# Patient Record
Sex: FEMALE | Race: WHITE | NOT HISPANIC OR LATINO | Employment: FULL TIME | URBAN - METROPOLITAN AREA
[De-identification: names, ages, dates, MRNs, and addresses within clinical notes are randomized per-mention and may not be internally consistent; named-entity substitution may affect disease eponyms.]

---

## 2019-02-15 ENCOUNTER — TRANSFERRED RECORDS (OUTPATIENT)
Dept: HEALTH INFORMATION MANAGEMENT | Facility: CLINIC | Age: 39
End: 2019-02-15

## 2019-12-19 DIAGNOSIS — O36.80X0 PREGNANCY WITH INCONCLUSIVE FETAL VIABILITY: Primary | ICD-10-CM

## 2019-12-19 NOTE — PROGRESS NOTES
Pt has NOB scheduled with ultrasound. Needed order to be placed. Future ultrasound order placed and linked with appt. Closing encounter.   Marilyn Cobb RN on 12/19/2019 at 1:00 PM

## 2019-12-20 ENCOUNTER — ANCILLARY PROCEDURE (OUTPATIENT)
Dept: ULTRASOUND IMAGING | Facility: CLINIC | Age: 39
End: 2019-12-20
Payer: COMMERCIAL

## 2019-12-20 ENCOUNTER — PRENATAL OFFICE VISIT (OUTPATIENT)
Dept: OBGYN | Facility: CLINIC | Age: 39
End: 2019-12-20
Payer: COMMERCIAL

## 2019-12-20 VITALS
BODY MASS INDEX: 26.34 KG/M2 | SYSTOLIC BLOOD PRESSURE: 137 MMHG | HEIGHT: 68 IN | HEART RATE: 74 BPM | DIASTOLIC BLOOD PRESSURE: 90 MMHG | WEIGHT: 173.8 LBS

## 2019-12-20 DIAGNOSIS — O36.80X0 PREGNANCY WITH INCONCLUSIVE FETAL VIABILITY: ICD-10-CM

## 2019-12-20 DIAGNOSIS — O09.511 PRIMIGRAVIDA OF ADVANCED MATERNAL AGE IN FIRST TRIMESTER: ICD-10-CM

## 2019-12-20 DIAGNOSIS — Z13.79 GENETIC SCREENING: Primary | ICD-10-CM

## 2019-12-20 DIAGNOSIS — O30.041 DICHORIONIC DIAMNIOTIC TWIN PREGNANCY IN FIRST TRIMESTER: ICD-10-CM

## 2019-12-20 DIAGNOSIS — O09.91 SUPERVISION OF HIGH RISK PREGNANCY IN FIRST TRIMESTER: ICD-10-CM

## 2019-12-20 LAB
ABO + RH BLD: NORMAL
ABO + RH BLD: NORMAL
ALBUMIN UR-MCNC: NEGATIVE MG/DL
APPEARANCE UR: CLEAR
BILIRUB UR QL STRIP: NEGATIVE
BLD GP AB SCN SERPL QL: NORMAL
BLOOD BANK CMNT PATIENT-IMP: NORMAL
COLOR UR AUTO: YELLOW
ERYTHROCYTE [DISTWIDTH] IN BLOOD BY AUTOMATED COUNT: 11.9 % (ref 10–15)
GLUCOSE UR STRIP-MCNC: NEGATIVE MG/DL
HCT VFR BLD AUTO: 35.7 % (ref 35–47)
HGB BLD-MCNC: 12 G/DL (ref 11.7–15.7)
HGB UR QL STRIP: NEGATIVE
KETONES UR STRIP-MCNC: NEGATIVE MG/DL
LEUKOCYTE ESTERASE UR QL STRIP: NEGATIVE
MCH RBC QN AUTO: 30 PG (ref 26.5–33)
MCHC RBC AUTO-ENTMCNC: 33.6 G/DL (ref 31.5–36.5)
MCV RBC AUTO: 89 FL (ref 78–100)
NITRATE UR QL: NEGATIVE
PH UR STRIP: 7.5 PH (ref 5–7)
PLATELET # BLD AUTO: 268 10E9/L (ref 150–450)
RBC # BLD AUTO: 4 10E12/L (ref 3.8–5.2)
SOURCE: ABNORMAL
SP GR UR STRIP: 1.02 (ref 1–1.03)
SPECIMEN EXP DATE BLD: NORMAL
UROBILINOGEN UR STRIP-ACNC: 0.2 EU/DL (ref 0.2–1)
WBC # BLD AUTO: 9.5 10E9/L (ref 4–11)

## 2019-12-20 PROCEDURE — 86762 RUBELLA ANTIBODY: CPT | Performed by: OBSTETRICS & GYNECOLOGY

## 2019-12-20 PROCEDURE — 76817 TRANSVAGINAL US OBSTETRIC: CPT | Performed by: OBSTETRICS & GYNECOLOGY

## 2019-12-20 PROCEDURE — 86900 BLOOD TYPING SEROLOGIC ABO: CPT | Performed by: OBSTETRICS & GYNECOLOGY

## 2019-12-20 PROCEDURE — 36415 COLL VENOUS BLD VENIPUNCTURE: CPT | Performed by: OBSTETRICS & GYNECOLOGY

## 2019-12-20 PROCEDURE — 85027 COMPLETE CBC AUTOMATED: CPT | Performed by: OBSTETRICS & GYNECOLOGY

## 2019-12-20 PROCEDURE — 99207 ZZC FIRST OB VISIT: CPT | Performed by: OBSTETRICS & GYNECOLOGY

## 2019-12-20 PROCEDURE — 81003 URINALYSIS AUTO W/O SCOPE: CPT | Performed by: OBSTETRICS & GYNECOLOGY

## 2019-12-20 PROCEDURE — 86901 BLOOD TYPING SEROLOGIC RH(D): CPT | Performed by: OBSTETRICS & GYNECOLOGY

## 2019-12-20 PROCEDURE — 87389 HIV-1 AG W/HIV-1&-2 AB AG IA: CPT | Performed by: OBSTETRICS & GYNECOLOGY

## 2019-12-20 PROCEDURE — 87086 URINE CULTURE/COLONY COUNT: CPT | Performed by: OBSTETRICS & GYNECOLOGY

## 2019-12-20 PROCEDURE — 87340 HEPATITIS B SURFACE AG IA: CPT | Performed by: OBSTETRICS & GYNECOLOGY

## 2019-12-20 PROCEDURE — 87491 CHLMYD TRACH DNA AMP PROBE: CPT | Performed by: OBSTETRICS & GYNECOLOGY

## 2019-12-20 PROCEDURE — 86780 TREPONEMA PALLIDUM: CPT | Performed by: OBSTETRICS & GYNECOLOGY

## 2019-12-20 PROCEDURE — 87591 N.GONORRHOEAE DNA AMP PROB: CPT | Performed by: OBSTETRICS & GYNECOLOGY

## 2019-12-20 PROCEDURE — 86850 RBC ANTIBODY SCREEN: CPT | Performed by: OBSTETRICS & GYNECOLOGY

## 2019-12-20 RX ORDER — ASPIRIN 81 MG/1
81 TABLET, CHEWABLE ORAL DAILY
COMMUNITY
End: 2020-07-14

## 2019-12-20 ASSESSMENT — ANXIETY QUESTIONNAIRES
5. BEING SO RESTLESS THAT IT IS HARD TO SIT STILL: NOT AT ALL
6. BECOMING EASILY ANNOYED OR IRRITABLE: NOT AT ALL
2. NOT BEING ABLE TO STOP OR CONTROL WORRYING: NOT AT ALL
7. FEELING AFRAID AS IF SOMETHING AWFUL MIGHT HAPPEN: NOT AT ALL
1. FEELING NERVOUS, ANXIOUS, OR ON EDGE: NOT AT ALL
GAD7 TOTAL SCORE: 0
IF YOU CHECKED OFF ANY PROBLEMS ON THIS QUESTIONNAIRE, HOW DIFFICULT HAVE THESE PROBLEMS MADE IT FOR YOU TO DO YOUR WORK, TAKE CARE OF THINGS AT HOME, OR GET ALONG WITH OTHER PEOPLE: NOT DIFFICULT AT ALL
3. WORRYING TOO MUCH ABOUT DIFFERENT THINGS: NOT AT ALL

## 2019-12-20 ASSESSMENT — MIFFLIN-ST. JEOR: SCORE: 1511.85

## 2019-12-20 ASSESSMENT — PATIENT HEALTH QUESTIONNAIRE - PHQ9
SUM OF ALL RESPONSES TO PHQ QUESTIONS 1-9: 2
5. POOR APPETITE OR OVEREATING: NOT AT ALL

## 2019-12-20 NOTE — PROGRESS NOTES
"    SUBJECTIVE:     HPI:    This is a 39 year old female patient,  who presents for her first obstetrical visit.    EVA: 2020, by Embryo Transfer.  She is 9w2d weeks.  Her cycles are irregular.  Twins IVF.   Since her LMP, she has experienced  nausea, fatigue and siatic pain).       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      She has a history of  first pregnancy twins    Since her last LMP she denies use of alcohol, tobacco and street drugs.    Past medical, surgical, social and family history were reviewed and updated in [facility].        Current Outpatient Medications   Medication     aspirin (ASA) 81 MG chewable tablet     Prenat w/o T-DQ-Ssmvifj-FA-DHA (PNV-DHA PO)     No current facility-administered medications for this visit.        ROS:   12 point review of systems negative other than symptoms noted below or in the HPI.  Constitutional: Fatigue  Gastrointestinal: Nausea  Musculoskeletal: siatic pain      OBJECTIVE:     EXAM:  BP (!) 137/90   Pulse 74   Ht 1.727 m (5' 8\")   Wt 78.8 kg (173 lb 12.8 oz)   BMI 26.43 kg/m   Body mass index is 26.43 kg/m .    GENERAL: healthy, alert and no distress  EYES: Eyes grossly normal to inspection, PERRL and conjunctivae and sclerae normal  HENT: ear canals and TM's normal, nose and mouth without ulcers or lesions  NECK: no adenopathy, no asymmetry, masses, or scars and thyroid normal to palpation  RESP: lungs clear to auscultation - no rales, rhonchi or wheezes  BREAST: normal without masses, tenderness or nipple discharge and no palpable axillary masses or adenopathy  CV: regular rate and rhythm, normal " S1 S2, no S3 or S4, no murmur, click or rub, no peripheral edema and peripheral pulses strong  ABDOMEN: soft, nontender, no hepatosplenomegaly, no masses and bowel sounds normal  MS: no gross musculoskeletal defects noted, no edema  SKIN: no suspicious lesions or rashes  NEURO: Normal strength and tone, mentation intact and speech normal  PSYCH: mentation appears normal, affect normal/bright  Cervix: no lesions    ASSESSMENT/PLAN:       ICD-10-CM    1. Supervision of high risk pregnancy in first trimester O09.91        39 year old , 9w2d weeks of pregnancy with EVA of 2020, by Embryo Transfer    Discussed as follows:mfm referral    Counseling given:   - Follow up in 4-6 weeks for return OB visit.      PLAN/PATIENT INSTRUCTIONS:    1. MFM referral fetal echo and level 2  2. Labs today  3. Not due for pap  4. ASA 81 mg, due to increased risk of pre-eclampsia, AMA and twins  5. Returning for genetic testing  6. Us at 12 weeks      Riana Jang MD

## 2019-12-21 LAB
BACTERIA SPEC CULT: NO GROWTH
Lab: NORMAL
RUBV IGG SERPL IA-ACNC: 59 IU/ML
SPECIMEN SOURCE: NORMAL
T PALLIDUM AB SER QL: NONREACTIVE

## 2019-12-21 ASSESSMENT — ANXIETY QUESTIONNAIRES: GAD7 TOTAL SCORE: 0

## 2019-12-22 LAB
HBV SURFACE AG SERPL QL IA: NONREACTIVE
HIV 1+2 AB+HIV1 P24 AG SERPL QL IA: NONREACTIVE

## 2019-12-23 LAB
C TRACH DNA SPEC QL NAA+PROBE: NEGATIVE
N GONORRHOEA DNA SPEC QL NAA+PROBE: NEGATIVE
SPECIMEN SOURCE: NORMAL
SPECIMEN SOURCE: NORMAL

## 2020-01-03 DIAGNOSIS — Z13.79 GENETIC SCREENING: ICD-10-CM

## 2020-01-03 PROCEDURE — 36415 COLL VENOUS BLD VENIPUNCTURE: CPT | Performed by: OBSTETRICS & GYNECOLOGY

## 2020-01-03 PROCEDURE — 40000791 ZZHCL STATISTIC VERIFI PRENATAL TRISOMY 21,18,13: Mod: 90 | Performed by: OBSTETRICS & GYNECOLOGY

## 2020-01-03 PROCEDURE — 99000 SPECIMEN HANDLING OFFICE-LAB: CPT | Performed by: OBSTETRICS & GYNECOLOGY

## 2020-01-08 ENCOUNTER — TELEPHONE (OUTPATIENT)
Dept: OBGYN | Facility: CLINIC | Age: 40
End: 2020-01-08

## 2020-01-08 NOTE — TELEPHONE ENCOUNTER
Innatal results: Negative    TEST RESULT INTERPRETATION   Chromosome 21 No aneuploidy detected  Results consistent with two copies of chromosome 21   Chromosome 18 No aneuploidy detected  Results consistent with two copies of chromosome 18   Chromosome 13 No aneuploidy detected  Results consistent with two copies of chromosome 13   Sex Chromosome No aneuploidy detected  Results consistent with two sex chromosomes: Both are  female     Called pt with results. Left detailed vm with negative results. Encouraged pt to call and ask to speak with a triage nurse with questions or interested in knowing the gender of the baby.    Marilyn Cobb RN on 1/8/2020 at 4:25 PM

## 2020-01-10 LAB — LAB SCANNED RESULT: NORMAL

## 2020-01-12 ENCOUNTER — NURSE TRIAGE (OUTPATIENT)
Dept: NURSING | Facility: CLINIC | Age: 40
End: 2020-01-12

## 2020-01-12 ENCOUNTER — HOSPITAL ENCOUNTER (EMERGENCY)
Facility: CLINIC | Age: 40
Discharge: HOME | End: 2020-01-12
Attending: EMERGENCY MEDICINE | Admitting: EMERGENCY MEDICINE
Payer: COMMERCIAL

## 2020-01-12 VITALS
HEART RATE: 84 BPM | WEIGHT: 173 LBS | SYSTOLIC BLOOD PRESSURE: 116 MMHG | RESPIRATION RATE: 18 BRPM | DIASTOLIC BLOOD PRESSURE: 71 MMHG | OXYGEN SATURATION: 100 % | TEMPERATURE: 98.1 F | BODY MASS INDEX: 26.3 KG/M2

## 2020-01-12 DIAGNOSIS — O99.280 DEHYDRATION DURING PREGNANCY: ICD-10-CM

## 2020-01-12 DIAGNOSIS — R19.7 NAUSEA, VOMITING, AND DIARRHEA: ICD-10-CM

## 2020-01-12 DIAGNOSIS — E86.0 DEHYDRATION DURING PREGNANCY: ICD-10-CM

## 2020-01-12 DIAGNOSIS — R11.2 NAUSEA, VOMITING, AND DIARRHEA: ICD-10-CM

## 2020-01-12 LAB
ALBUMIN SERPL-MCNC: 3 G/DL (ref 3.4–5)
ALBUMIN UR-MCNC: 30 MG/DL
ALP SERPL-CCNC: 66 U/L (ref 40–150)
ALT SERPL W P-5'-P-CCNC: 83 U/L (ref 0–50)
ANION GAP SERPL CALCULATED.3IONS-SCNC: 11 MMOL/L (ref 3–14)
APPEARANCE UR: CLEAR
AST SERPL W P-5'-P-CCNC: 43 U/L (ref 0–45)
BASOPHILS # BLD AUTO: 0 10E9/L (ref 0–0.2)
BASOPHILS NFR BLD AUTO: 0.1 %
BILIRUB SERPL-MCNC: 0.4 MG/DL (ref 0.2–1.3)
BILIRUB UR QL STRIP: NEGATIVE
BUN SERPL-MCNC: 11 MG/DL (ref 7–30)
CALCIUM SERPL-MCNC: 8.7 MG/DL (ref 8.5–10.1)
CHLORIDE SERPL-SCNC: 107 MMOL/L (ref 94–109)
CO2 SERPL-SCNC: 18 MMOL/L (ref 20–32)
COLOR UR AUTO: YELLOW
CREAT SERPL-MCNC: 0.58 MG/DL (ref 0.52–1.04)
DIFFERENTIAL METHOD BLD: ABNORMAL
EOSINOPHIL # BLD AUTO: 0 10E9/L (ref 0–0.7)
EOSINOPHIL NFR BLD AUTO: 0.1 %
ERYTHROCYTE [DISTWIDTH] IN BLOOD BY AUTOMATED COUNT: 12.4 % (ref 10–15)
GFR SERPL CREATININE-BSD FRML MDRD: >90 ML/MIN/{1.73_M2}
GLUCOSE SERPL-MCNC: 120 MG/DL (ref 70–99)
GLUCOSE UR STRIP-MCNC: >1000 MG/DL
HCT VFR BLD AUTO: 37 % (ref 35–47)
HGB BLD-MCNC: 12.6 G/DL (ref 11.7–15.7)
HGB UR QL STRIP: NEGATIVE
HYALINE CASTS #/AREA URNS LPF: 16 /LPF (ref 0–2)
IMM GRANULOCYTES # BLD: 0 10E9/L (ref 0–0.4)
IMM GRANULOCYTES NFR BLD: 0.4 %
KETONES UR STRIP-MCNC: >150 MG/DL
LEUKOCYTE ESTERASE UR QL STRIP: NEGATIVE
LYMPHOCYTES # BLD AUTO: 0.4 10E9/L (ref 0.8–5.3)
LYMPHOCYTES NFR BLD AUTO: 3.4 %
MCH RBC QN AUTO: 29.7 PG (ref 26.5–33)
MCHC RBC AUTO-ENTMCNC: 34.1 G/DL (ref 31.5–36.5)
MCV RBC AUTO: 87 FL (ref 78–100)
MONOCYTES # BLD AUTO: 0.3 10E9/L (ref 0–1.3)
MONOCYTES NFR BLD AUTO: 2.7 %
MUCOUS THREADS #/AREA URNS LPF: PRESENT /LPF
NEUTROPHILS # BLD AUTO: 10.6 10E9/L (ref 1.6–8.3)
NEUTROPHILS NFR BLD AUTO: 93.3 %
NITRATE UR QL: NEGATIVE
PH UR STRIP: 6 PH (ref 5–7)
PLATELET # BLD AUTO: 252 10E9/L (ref 150–450)
POTASSIUM SERPL-SCNC: 3.4 MMOL/L (ref 3.4–5.3)
PROT SERPL-MCNC: 6.8 G/DL (ref 6.8–8.8)
RBC # BLD AUTO: 4.24 10E12/L (ref 3.8–5.2)
RBC #/AREA URNS AUTO: 1 /HPF (ref 0–2)
SODIUM SERPL-SCNC: 136 MMOL/L (ref 133–144)
SOURCE: ABNORMAL
SP GR UR STRIP: 1.03 (ref 1–1.03)
SQUAMOUS #/AREA URNS AUTO: <1 /HPF (ref 0–1)
UROBILINOGEN UR STRIP-MCNC: NORMAL MG/DL (ref 0–2)
WBC # BLD AUTO: 11.3 10E9/L (ref 4–11)
WBC #/AREA URNS AUTO: 1 /HPF (ref 0–5)

## 2020-01-12 PROCEDURE — 80053 COMPREHEN METABOLIC PANEL: CPT | Performed by: EMERGENCY MEDICINE

## 2020-01-12 PROCEDURE — 85025 COMPLETE CBC W/AUTO DIFF WBC: CPT | Performed by: EMERGENCY MEDICINE

## 2020-01-12 PROCEDURE — 99284 EMERGENCY DEPT VISIT MOD MDM: CPT | Mod: 25

## 2020-01-12 PROCEDURE — 96365 THER/PROPH/DIAG IV INF INIT: CPT

## 2020-01-12 PROCEDURE — 25800030 ZZH RX IP 258 OP 636: Performed by: EMERGENCY MEDICINE

## 2020-01-12 PROCEDURE — 81001 URINALYSIS AUTO W/SCOPE: CPT | Performed by: EMERGENCY MEDICINE

## 2020-01-12 PROCEDURE — 25000132 ZZH RX MED GY IP 250 OP 250 PS 637: Performed by: EMERGENCY MEDICINE

## 2020-01-12 PROCEDURE — 96361 HYDRATE IV INFUSION ADD-ON: CPT

## 2020-01-12 RX ORDER — PYRIDOXINE HCL (VITAMIN B6) 25 MG
25 TABLET ORAL ONCE
Status: COMPLETED | OUTPATIENT
Start: 2020-01-12 | End: 2020-01-12

## 2020-01-12 RX ORDER — DOXYLAMINE SUCCINATE AND PYRIDOXINE HYDROCHLORIDE, DELAYED RELEASE TABLETS 10 MG/10 MG 10; 10 MG/1; MG/1
2 TABLET, DELAYED RELEASE ORAL 2 TIMES DAILY PRN
Qty: 30 TABLET | Refills: 0 | Status: SHIPPED | OUTPATIENT
Start: 2020-01-12 | End: 2020-02-07

## 2020-01-12 RX ORDER — DEXTROSE MONOHYDRATE 50 MG/ML
INJECTION, SOLUTION INTRAVENOUS CONTINUOUS
Status: DISCONTINUED | OUTPATIENT
Start: 2020-01-12 | End: 2020-01-12 | Stop reason: HOSPADM

## 2020-01-12 RX ADMIN — DOXYLAMINE SUCCINATE 25 MG: 25 TABLET ORAL at 08:56

## 2020-01-12 RX ADMIN — DEXTROSE MONOHYDRATE: 50 INJECTION, SOLUTION INTRAVENOUS at 09:14

## 2020-01-12 RX ADMIN — Medication 25 MG: at 08:56

## 2020-01-12 RX ADMIN — SODIUM CHLORIDE 1000 ML: 9 INJECTION, SOLUTION INTRAVENOUS at 08:42

## 2020-01-12 ASSESSMENT — ENCOUNTER SYMPTOMS
DIARRHEA: 1
FEVER: 0
NAUSEA: 1
DYSURIA: 0
ABDOMINAL PAIN: 0
VOMITING: 1

## 2020-01-12 NOTE — ED AVS SNAPSHOT
Emergency Department  [address] [place] [address]  Phone:  [phone]  Fax:  [phone]                                    Cristin Huerta   MRN: [id number]    Department:   Emergency Department   Date of Visit:  1/12/2020           After Visit Summary Signature Page    I have received my discharge instructions, and my questions have been answered. I have discussed any challenges I see with this plan with the nurse or doctor.    ..........................................................................................................................................  Patient/Patient Representative Signature      ..........................................................................................................................................  Patient Representative Print Name and Relationship to Patient    ..................................................               ................................................  Date                                   Time    ..........................................................................................................................................  Reviewed by Signature/Title    ...................................................              ..............................................  Date                                               Time          22EPIC Rev 08/18

## 2020-01-12 NOTE — TELEPHONE ENCOUNTER
"Patient states she is 12 weeks pregnant with twins.   EDC 7/22/20. First pregnancy.  States has had vomiting and diarrhea x past 12 hours.    Currently reports vomiting x 8 in past 12 hours. Unable to tolerate sips.  Reports diarrhea x 10 in past 12 hours. Describes as brown liquid stool.  Afebrile. No cough or cold symptoms.    Describes dry mouth.   States has slight lightheadedness now.  Last voided a small amount 3:00am by report.     Tried sip of ginger ale at 3:00am and vomited. Repeated sip at 7:00am and again at 7:15am.   Has not vomited since.    Sees OB at [facility].     Protocol-  Pregnancy Complications- Nausea & Vomiting- Adult  Care advice reviewed.   Disposition- Go to ED   Caller states understanding of the recommended disposition.   Plans to go now to [facility] ED.  Advised to call back if further questions or concerns.     ISAÍAS KcN RN  [facility]     Reason for Disposition    [1] SEVERE vomiting (e.g., 8 or more times / day) AND [2] present > 8 hours    Additional Information    Negative: Sounds like a life-threatening emergency to the triager    Negative: [1] Vomiting AND [2] contains red blood or black (\"coffee ground\") material  (Exception: few red streaks in vomit that only happened once)    Negative: [1] Insulin-dependent diabetes (Type I) AND [2] glucose > 400 mg/dl (22 mmol/l)    Negative: Recent head injury (within last 3 days)    Negative: Recent abdominal injury (within last 3 days)    Negative: Severe pain in one eye    Protocols used: PREGNANCY - MORNING SICKNESS (NAUSEA AND VOMITING OF PREGNANCY)-A-    "

## 2020-01-12 NOTE — ED PROVIDER NOTES
History     Chief Complaint:  Nausea, vomiting, diarrhea     HPI   Cristin Huerta is a 39 year old female,  12 weeks pregnant with twins, who presents to the emergency department for evaluation of nausea, vomiting, and diarrhea. The patient reports she began having diarrhea last night at 1700 and vomiting shortly after which has persisted all night, prompting her to call the nurse line who instructed her to present to the ED. The patient denies abdominal pain, fever, and dysuria. She reports this is her first pregnancy, and she has had about a week of nausea and vomiting during her pregnancy but is otherwise healthy.     Allergies:  NKDA     Medications:    Aspirin 81 mg      Past Medical History:    Depression   Bulimia  LGSIL  Lipoma of lower extremity    Past Surgical History:    Jaw surgery   Colposcopy x3  LEEP  Right fallopian tube removal  D & C   Orthopedic right wrist surgery   Breast biopsy    Family History:    No past pertinent family history.     Social History:  Presents with .  Former smoker.  Negative for alcohol use.   Negative for drug use.   Marital Status:        Review of Systems   Constitutional: Negative for fever.   Gastrointestinal: Positive for diarrhea, nausea and vomiting. Negative for abdominal pain.   Genitourinary: Negative for dysuria.   All other systems reviewed and are negative.        Physical Exam     Patient Vitals for the past 24 hrs:   BP Temp Temp src Pulse Heart Rate Resp SpO2 Weight   20 1031 116/71 -- -- 84 -- 18 100 % --   20 0817 133/80 98.1  F (36.7  C) Oral -- 99 18 98 % 78.5 kg (173 lb)      Physical Exam  General: Alert, interactive in mild distress  Head:  Scalp is atraumatic  Eyes:  The pupils are equal, round, and reactive to light    EOM's intact    No scleral icterus  ENT:      Nose:  The external nose is normal  Ears:  External ears are normal  Mouth/Throat: The oropharynx is normal    Mucus membranes are dry      Neck:  Normal range  of motion.      There is no rigidity.    Trachea is in the midline         CV:  Regular rate and rhythm    No murmur   Resp:  Breath sounds are clear bilaterally    Non-labored, no retractions or accessory muscle use      GI:  Abdomen is soft, no distension, no tenderness.       MS:  Normal strength in all 4 extremities  Skin:  Warm and dry, No rash or lesions noted.  Neuro: Strength 5/5 x4.   Psych:  Awake. Alert.  Normal affect.      Appropriate interactions    Emergency Department Course     Laboratory:  CMP: Glucose 120 (H), Carbon dioxide 18 (L), Albumin 3.0 (L), ALT 83 (H), o/w WNL (Creatinine: 0.58)     CBC: WBC: 11.3 (H), HGB: 12.6, PLT: 252     UA with micro: Glucose >1000, Ketones >150, Albumin 30, Mucous present, Hyaline Casts 16 (H), o/w negative     Interventions:  0842 NS 1L IV BOLUS  0856 Unisom 25 mg PO   Vitamin B6 25 mg PO  0914 Dextrose 5% infusion IV    Emergency Department Course:  Past medical records, nursing notes, and vitals reviewed.    0826 I performed an exam of the patient as documented above.     IV was inserted and blood was drawn for laboratory testing, results above. Medication administered as noted above.     The patient provided a urine sample here in the emergency department. This was sent for laboratory testing, findings above.    1020 I rechecked the patient and discussed the results of her workup thus far.     Findings and plan explained to the Patient and spouse. Patient discharged home with instructions regarding supportive care, medications, and reasons to return. The importance of close follow-up was reviewed. The patient was prescribed Doxylamine-pyridoxine.     I personally reviewed the laboratory results with the Patient and spouse and answered all related questions prior to discharge.     Impression & Plan      Medical Decision Making:  Cristin Huerta is a 39 year old female who was seen and evaluated. Following presentation history and physical examination, the above  work-up was undertaken. This demonstrated signs of dehydration but was otherwise unremarkable.  Patient has had no vaginal bleeding or pain, and I do not believe she needs any interrogation of the pregnancy.  There are no signs of acute peritonitis or more concerning illness.  She received the above medications with significant improvement of her symptoms and was able to tolerate p.o. intake.  I will discharge her with medications as below and have recommended close follow-up with her OB/GYN.  I think this likely represents a viral gastroenteritis.    Diagnosis:    ICD-10-CM   1. Nausea, vomiting, and diarrhea R11.2    R19.7   2. Dehydration during pregnancy O26.899    E86.0     Disposition:  discharged to home    Discharge Medications:  New Prescriptions    DOXYLAMINE-PYRIDOXINE 10-10 MG TBEC    Take 2 tablets by mouth 2 times daily as needed (Nausea/Vomiting)     Scribe Disclosure:   Smiley BRAVO, am serving as a scribe on 1/12/2020 at 8:26 AM to personally document services performed by Oren West,* based on my observations and the provider's statements to me.      Smiley Thomas  1/12/2020    EMERGENCY DEPARTMENT       Oren West MD  01/12/20 1160

## 2020-01-12 NOTE — DISCHARGE INSTRUCTIONS
Discharge Instructions  Gastroenteritis    You have been seen today for vomiting (throwing up) and diarrhea (loose stools), called gastroenteritis or the stomach flu. This is usually caused by a virus, but some bacteria, parasites, medicines or other medical conditions can cause similar symptoms. At this time your provider does not find that your vomiting and diarrhea is a sign of anything dangerous or life-threatening.  However, sometimes the signs of serious illness do not show up right away. Remember that serious problems like appendicitis can look like gastroenteritis at first.       Generally, every Emergency Department visit should have a follow-up clinic visit with either a primary or a specialty clinic/provider. Please follow-up as instructed by your emergency provider today.    Return to the Emergency Department if:  You keep vomiting and you are not able to keep liquids down.  You feel you are getting dehydrated, such as being very thirsty, not urinating (peeing), or feeling faint or lightheaded.   You develop a new fever.  You have abdominal (belly) pain that seems worse than cramps, is in one spot, or is getting worse over time.   You have blood in your vomit or in your diarrhea.  You feel very weak.    What can I do to help myself?  The most important thing to do is to drink clear liquids.  If you have been vomiting a lot, it is best to have only small, frequent sips of liquids.  Drinking too much at once may cause more vomiting. Water is a good first option for rehydration. If you are vomiting often, you must also replace electrolytes (salts and minerals) lost with your illness. Pedialyte  is the best rehydration liquid but many don t like the taste so sports drinks (like Gatorade ) are a good option. Sodas and juice are also options but are high in sugar. Avoid acid liquids (orange), caffeine (coffee) or alcohol. Do not drink milk until you no longer have diarrhea.  After liquids are staying down, you  may start eating mild foods. Soda crackers, toast, plain noodles, gelatin, applesauce and bananas are good first choices.  Avoid foods that have acid, are spicy, fatty or fibrous (such as meats, coarse grains, vegetables). You may start eating these foods again in about 3 days when you are better.  Sometimes treatment includes prescription medicine to prevent nausea (sick to your stomach) and vomiting and to prevent diarrhea. If your provider prescribes these for you, take them as directed.  Nonprescription medicine is available for the treatment of diarrhea and can be very effective.  If you use it, make sure you use the dose recommended on the package. Avoid Lomotil . Check with your healthcare provider before you use any medicine for diarrhea.  Do not take ibuprofen, or other nonsteroidal anti-inflammatory medicines without checking with your healthcare provider.  If you were given a prescription for medicine here today, be sure to read all of the information (including the package insert) that comes with your prescription.  This will include important information about the medicine, its side effects, and any warnings that you need to know about.  The pharmacist who fills the prescription can provide more information and answer questions you may have about the medicine.  If you have questions or concerns that the pharmacist cannot address, please call or return to the Emergency Department.   Remember that you can always come back to the Emergency Department if you are not able to see your regular provider in the amount of time listed above, if you get any new symptoms, or if there is anything that worries you.

## 2020-01-14 ENCOUNTER — ANCILLARY PROCEDURE (OUTPATIENT)
Dept: ULTRASOUND IMAGING | Facility: CLINIC | Age: 40
End: 2020-01-14
Attending: OBSTETRICS & GYNECOLOGY
Payer: COMMERCIAL

## 2020-01-14 ENCOUNTER — PRENATAL OFFICE VISIT (OUTPATIENT)
Dept: OBGYN | Facility: CLINIC | Age: 40
End: 2020-01-14
Attending: OBSTETRICS & GYNECOLOGY
Payer: COMMERCIAL

## 2020-01-14 VITALS — WEIGHT: 174 LBS | SYSTOLIC BLOOD PRESSURE: 122 MMHG | BODY MASS INDEX: 26.46 KG/M2 | DIASTOLIC BLOOD PRESSURE: 74 MMHG

## 2020-01-14 DIAGNOSIS — O09.91 SUPERVISION OF HIGH RISK PREGNANCY IN FIRST TRIMESTER: ICD-10-CM

## 2020-01-14 DIAGNOSIS — O30.041 DICHORIONIC DIAMNIOTIC TWIN PREGNANCY IN FIRST TRIMESTER: ICD-10-CM

## 2020-01-14 PROCEDURE — 99207 ZZC PRENATAL VISIT: CPT | Performed by: OBSTETRICS & GYNECOLOGY

## 2020-01-14 PROCEDURE — 76816 OB US FOLLOW-UP PER FETUS: CPT | Mod: 59 | Performed by: OBSTETRICS & GYNECOLOGY

## 2020-01-14 NOTE — PROGRESS NOTES
She had nausea and emesis this weekend, received IVF and feeling well now.  S/p flu shot for   Has ultrasound with MFM and fetal echo scheduled  16 week appt  Discussed bpps weekly at 32 weeks and growth every 4 weeks  Touched on presentation  Discussed discordancy  ASA until end of pregnancy  Discussed prenatal vitamins

## 2020-02-07 ENCOUNTER — PRENATAL OFFICE VISIT (OUTPATIENT)
Dept: OBGYN | Facility: CLINIC | Age: 40
End: 2020-02-07
Payer: COMMERCIAL

## 2020-02-07 VITALS — BODY MASS INDEX: 27.67 KG/M2 | SYSTOLIC BLOOD PRESSURE: 110 MMHG | WEIGHT: 182 LBS | DIASTOLIC BLOOD PRESSURE: 70 MMHG

## 2020-02-07 DIAGNOSIS — Z36.1 NEED FOR MATERNAL SERUM ALPHA-PROTEIN (MSAFP) SCREENING: Primary | ICD-10-CM

## 2020-02-07 DIAGNOSIS — O09.91 SUPERVISION OF HIGH RISK PREGNANCY IN FIRST TRIMESTER: ICD-10-CM

## 2020-02-07 PROCEDURE — 99207 ZZC PRENATAL VISIT: CPT | Performed by: OBSTETRICS & GYNECOLOGY

## 2020-02-07 PROCEDURE — 82105 ALPHA-FETOPROTEIN SERUM: CPT | Mod: 90 | Performed by: OBSTETRICS & GYNECOLOGY

## 2020-02-07 PROCEDURE — 99000 SPECIMEN HANDLING OFFICE-LAB: CPT | Performed by: OBSTETRICS & GYNECOLOGY

## 2020-02-07 PROCEDURE — 36415 COLL VENOUS BLD VENIPUNCTURE: CPT | Performed by: OBSTETRICS & GYNECOLOGY

## 2020-02-07 NOTE — PROGRESS NOTES
3/2 MFM level 2, anatomy scan  Fetal echo  Growth us every 4 weeks  bpps weekly at 32 weeks  3/11- fetal echo

## 2020-02-10 ENCOUNTER — HEALTH MAINTENANCE LETTER (OUTPATIENT)
Age: 40
End: 2020-02-10

## 2020-02-11 LAB
# FETUSES US: NORMAL
# FETUSES: NORMAL
AFP ADJ MOM AMN: 3.47
AFP SERPL-MCNC: 103 NG/ML
AGE - REPORTED: 39.6 YR
CURRENT SMOKER: NO
CURRENT SMOKER: NO
DIABETES STATUS PATIENT: NO
FAMILY MEMBER DISEASES HX: NO
FAMILY MEMBER DISEASES HX: NO
GA METHOD: NORMAL
GA METHOD: NORMAL
GA: NORMAL WK
IDDM PATIENT QL: NO
INTEGRATED SCN PATIENT-IMP: NORMAL
LMP START DATE: NORMAL
MONOCHORIONIC TWINS: NO
SERVICE CMNT-IMP: NO
SPECIMEN DRAWN SERPL: NORMAL
VALPROIC/CARBAMAZEPINE STATUS: NO
WEIGHT UNITS: NORMAL

## 2020-02-17 ENCOUNTER — TELEPHONE (OUTPATIENT)
Dept: OBGYN | Facility: CLINIC | Age: 40
End: 2020-02-17

## 2020-02-17 NOTE — TELEPHONE ENCOUNTER
Patient states that she has had the tugging and pulling pain on the right side but this time it is on the left side and feels almost like period cramps.      Denies bleeding, tightening, LOF, urinary symptoms.  When asked about fluids she stated she knows she did not drink enough over the weekend, even woke up with a headache.    Informed about round ligament stretching and the importance of hydration.  Will continue to monitor and call with any of the above symptoms.  Pt verbalized understanding, in agreement with plan, and voiced no further questions.  Trena Wells RN on 2/17/2020 at 10:37 AM

## 2020-02-24 ENCOUNTER — PRE VISIT (OUTPATIENT)
Dept: MATERNAL FETAL MEDICINE | Facility: CLINIC | Age: 40
End: 2020-02-24

## 2020-03-02 ENCOUNTER — HOSPITAL ENCOUNTER (OUTPATIENT)
Dept: ULTRASOUND IMAGING | Facility: CLINIC | Age: 40
Discharge: HOME | End: 2020-03-02
Attending: OBSTETRICS & GYNECOLOGY | Admitting: OBSTETRICS & GYNECOLOGY
Payer: COMMERCIAL

## 2020-03-02 ENCOUNTER — OFFICE VISIT (OUTPATIENT)
Dept: MATERNAL FETAL MEDICINE | Facility: CLINIC | Age: 40
End: 2020-03-02
Attending: OBSTETRICS & GYNECOLOGY
Payer: COMMERCIAL

## 2020-03-02 DIAGNOSIS — O26.90 PREGNANCY RELATED CONDITION, ANTEPARTUM: ICD-10-CM

## 2020-03-02 DIAGNOSIS — O26.879 SHORT CERVIX AFFECTING PREGNANCY: Primary | ICD-10-CM

## 2020-03-02 DIAGNOSIS — O09.512 SUPERVISION OF ELDERLY PRIMIGRAVIDA IN SECOND TRIMESTER: Primary | ICD-10-CM

## 2020-03-02 DIAGNOSIS — Z78.9 CONCEIVED BY IN VITRO FERTILIZATION: ICD-10-CM

## 2020-03-02 PROCEDURE — 76817 TRANSVAGINAL US OBSTETRIC: CPT | Performed by: OBSTETRICS & GYNECOLOGY

## 2020-03-02 PROCEDURE — 96040 ZZH GENETIC COUNSELING, EACH 30 MINUTES: CPT | Mod: ZF | Performed by: GENETIC COUNSELOR, MS

## 2020-03-02 PROCEDURE — 76812 OB US DETAILED ADDL FETUS: CPT

## 2020-03-02 NOTE — PROGRESS NOTES
[facility]  Genetic Counseling Consult    Patient:  Cristin Huerta YOB: 1980   Date of Service:  3/02/20      Cristin Huerta was seen at the [facility] for genetic consultation as part of her appointment for comprehensive ultrasound.  The indication for genetic counseling is advanced maternal age. She was accompanied to today's visit by her partner, Fidel.       Impression/Plan:   1. Cristin had a cell-free fetal DNA test earlier in pregnancy, which was normal. She also had a MS-AFP screen within normal range.    2. Cristin had a comprehensive (level II) ultrasound for her di/di twin gestation today.  Please see the ultrasound report for further details.    3. The patient declines genetic amniocentesis and additional maternal serum screening today.    Pregnancy History:   /Parity:    Age at Delivery: 39 year old  EVA: 2020, by Embryo Transfer  Gestational Age: 19w5d    No significant complications or exposures were reported in the current pregnancy.    Cristin is pregnant with dichorionic, diamniotic twins via in vitro fertilization. Two, three day embryos were freshly transferred on 11/3/2019.    Medical History:   Cristin reports a LEEP procedure no more recent than .        Family History:   A three-generation pedigree was obtained, and is scanned under the  Media  tab.   The following significant findings were reported by Cristin:    Cristin reports a maternal family history of colon cancer in a few relatives. No one has been diagnosed with cancer under the age of 50 and her mother has no personal history of colon polyps. Cristin was encouraged to share this information with her medical providers to ensure appropriate screening recommendations.     Otherwise, the reported family history is negative for multiple miscarriages, stillbirths, birth defects, mental retardation, known genetic conditions, and consanguinity.       Carrier  Screening:   The patient reports that she and the father of the pregnancy have  ancestry:      Cystic fibrosis is an autosomal recessive genetic condition that occurs with increased frequency in individuals of  ancestry and carrier screening for this condition is available.  In addition,  screening in the [facility] includes cystic fibrosis.      Expanded carrier screening for mutations in a large panel of genes associated with autosomal recessive conditions including cystic fibrosis, spinal muscular atrophy, and others, is now available.      The patient has declined the carrier screening options reviewed today.       Risk Assessment for Chromosome Conditions:   We explained that the risk for fetal chromosome abnormalities increases with maternal age. We discussed specific features of common chromosome abnormalities, including Down syndrome, trisomy 13, trisomy 18, and sex chromosome trisomies.      - At age 39 at midtrimester, the risk to have a baby with Down syndrome is 1 in 98.     - At age 39 at midtrimester, the risk to have a baby with any chromosome abnormality is 1 in 51.       Cristin had maternal serum screening earlier in pregnancy.     Non-invasive Prenatal Testing (NIPT)    Maternal plasma cell-free DNA testing    Screens for fetal trisomy 21, trisomy 13, trisomy 18, and sex chromosome aneuploidy    First trimester ultrasound with nuchal translucency and nasal bone assessment was not performed in this pregnancy, to our knowledge.    Cristin had an Innatal test earlier in pregnancy; we reviewed the results today, which are normal for chromosome 13, chromosome 18 and chromosome 21 (no aneuploidy detected)    Given the accuracy of this test, these results greatly decrease the chance for certain fetal chromosome abnormalities    We discussed the limitations of normal NIPT results    MSAFP (after 15 weeks for open neural tube defect screening) results were increased from her  pre-test risk to 1 in 235, however, well below the screening cut-off of 1 in 103. This is considered a normal result.        Testing Options:   We discussed the following options:   Comprehensive (Level II) ultrasound: Detailed ultrasound performed between 18-22 weeks gestation to screen for major birth defects and markers for aneuploidy.      We reviewed the benefits and limitations of this testing.  Screening tests provide a risk assessment specific to the pregnancy for certain fetal chromosome abnormalities, but cannot definitively diagnose or exclude a fetal chromosome abnormality.  Follow-up genetic counseling and consideration of diagnostic testing is recommended with any abnormal screening result.     Diagnostic tests carry inherent risks- including risk of miscarriage- that require careful consideration.  These tests can detect fetal chromosome abnormalities with greater than 99% certainty.  Results can be compromised by maternal cell contamination or mosaicism, and are limited by the resolution of cytogenetic G-banding technology.  There is no screening nor diagnostic test that can detect all forms of birth defects or mental disability.    It was a pleasure to be involved with Cristin [facility]. Face-to-face time of the meeting was 30 minutes.      Trina Chua MS, [facility]  Maternal Fetal Medicine  [facility]  Ph: [phone]  [email or web address]

## 2020-03-02 NOTE — PROGRESS NOTES
MFM Problem visit - short cervix, twin gestation    Cristin and her  are seen today to discuss complications of a di-di IVF twin pregnancy, with the unanticipated finding of a short cervix, 1.6-2.0 cm.     I spent 20 minutes with Cristin and her  during today's visit, separate from interpretation and performance of the ultrasound, discussing sonographic short cervix, asymptomatic, in a twin pregnancy without a prior history of  birth. Cristin reports no contractions, leaking of fluid or vaginal bleeding. She did have one LEEP procedure, no [facility] though, in .    She is aware of the association of this finding with an increased risk of  birth, including previable delivery, in both obrien and twin gestations.     We reviewed the conflicting evidence for interventions to treat asymptomatic short cervix in a twin gestation. We reviewed the potential for treatment using vaginal progesterone and/or cervical cerclage. We discussed the lack of compelling evidence of cervical pessary treatment with this finding. We discussed how options might potentially change if the cervix is either < 10 mm or is visibly dilated on US and physical exam. We discussed how data differ for interventions between a obrien and twin pregnancies, with less effective treatment to offer with twins. We discussed the lack of adverse events with vaginal progesterone, which she was on earlier in the pregnancy for IVF.     At this time, after consider the benefits and risks, including the potential lack of benefit, for vaginal progesterone, Cristin would like to initiate vaginal progesterone nightly, and this was prescribed today. She will return for a cervical length in one week, and is aware that a cervical cerclage may be recommended if she has cervical dilation or possibly very severe cervical shortening. The case was reviewed with our on call inpatient MFM Dr. Trujillo who concurred with this plan of treatment.     Regardless  of cervical length treatment, Cristin has a fetal echo (for twins) scheduled with pediatric cardiology and a follow up twin anatomy US here in 3-4 weeks. If this is completed and normal, she will have serial twin growth with Dr. Jang for the remainder of the pregnancy.     Thompson Evans MD     Maternal Fetal Medicine  [facility] [phone]  [email or web address]

## 2020-03-02 NOTE — NURSING NOTE
"Patient presents for L2 US - cervical shortening was noted. Denies LOF, vaginal bleeding, contractions or cramping. Does state she has occasional \"tugging.\" Dr. Evans discussed US with patient. Plan to start vaginal progesterone. Medication e-prescribed to desired pharmacy. F/U TV scheduled on Thursday at [facility] and f/u comp in 4 weeks for anatomy/growth. In basket message sent to primary and [facility] triage nurse, Marilyn MALONE with update.  "

## 2020-03-02 NOTE — PROGRESS NOTES
Please see full imaging report from ViewPoint program under imaging tab.      Thompson Evans MD  Maternal Fetal Medicine

## 2020-03-03 ENCOUNTER — TELEPHONE (OUTPATIENT)
Dept: OBGYN | Facility: CLINIC | Age: 40
End: 2020-03-03

## 2020-03-03 ENCOUNTER — MEDICAL CORRESPONDENCE (OUTPATIENT)
Dept: HEALTH INFORMATION MANAGEMENT | Facility: CLINIC | Age: 40
End: 2020-03-03

## 2020-03-03 NOTE — TELEPHONE ENCOUNTER
Saw MFM yesterday-short cervix-f/u on Thursday. Determine just monitoring or cerclage, is on progesterone currently. Concerned after researching on line that a cerclage is not indicated for twin pregnancy. Reassured MFM are experts in their field and would not recommend a procedure that is not appropriate. Advised make a list of questions regarding the cerclage, procedure, and recovery to discuss on Thursday. Pt agrees.   Lia Ga RN on 3/3/2020 at 1:10 PM

## 2020-03-05 ENCOUNTER — HOSPITAL ENCOUNTER (OUTPATIENT)
Facility: CLINIC | Age: 40
Setting detail: OBSERVATION
Discharge: HOME | End: 2020-03-06
Attending: OBSTETRICS & GYNECOLOGY | Admitting: OBSTETRICS & GYNECOLOGY
Payer: COMMERCIAL

## 2020-03-05 ENCOUNTER — ANESTHESIA (OUTPATIENT)
Dept: OBGYN | Facility: CLINIC | Age: 40
End: 2020-03-05
Payer: COMMERCIAL

## 2020-03-05 ENCOUNTER — ANESTHESIA EVENT (OUTPATIENT)
Dept: OBGYN | Facility: CLINIC | Age: 40
End: 2020-03-05
Payer: COMMERCIAL

## 2020-03-05 ENCOUNTER — OFFICE VISIT (OUTPATIENT)
Dept: MATERNAL FETAL MEDICINE | Facility: CLINIC | Age: 40
End: 2020-03-05
Attending: OBSTETRICS & GYNECOLOGY
Payer: COMMERCIAL

## 2020-03-05 ENCOUNTER — HOSPITAL ENCOUNTER (OUTPATIENT)
Dept: ULTRASOUND IMAGING | Facility: CLINIC | Age: 40
End: 2020-03-05
Attending: OBSTETRICS & GYNECOLOGY
Payer: COMMERCIAL

## 2020-03-05 DIAGNOSIS — O30.042 DICHORIONIC DIAMNIOTIC TWIN PREGNANCY IN SECOND TRIMESTER: Primary | ICD-10-CM

## 2020-03-05 DIAGNOSIS — O34.32 CERVICAL INSUFFICIENCY DURING PREGNANCY IN SECOND TRIMESTER, ANTEPARTUM: ICD-10-CM

## 2020-03-05 DIAGNOSIS — O26.879 SHORT CERVIX AFFECTING PREGNANCY: Primary | ICD-10-CM

## 2020-03-05 DIAGNOSIS — O26.879 SHORT CERVIX AFFECTING PREGNANCY: ICD-10-CM

## 2020-03-05 LAB
ABO + RH BLD: NORMAL
ABO + RH BLD: NORMAL
ALBUMIN UR-MCNC: NEGATIVE MG/DL
APPEARANCE UR: CLEAR
BACTERIA #/AREA URNS HPF: ABNORMAL /HPF
BASOPHILS # BLD AUTO: 0 10E9/L (ref 0–0.2)
BASOPHILS NFR BLD AUTO: 0.1 %
BILIRUB UR QL STRIP: NEGATIVE
BLD GP AB SCN SERPL QL: NORMAL
BLOOD BANK CMNT PATIENT-IMP: NORMAL
COLOR UR AUTO: ABNORMAL
DIFFERENTIAL METHOD BLD: ABNORMAL
EOSINOPHIL # BLD AUTO: 0.2 10E9/L (ref 0–0.7)
EOSINOPHIL NFR BLD AUTO: 1.7 %
ERYTHROCYTE [DISTWIDTH] IN BLOOD BY AUTOMATED COUNT: 12.9 % (ref 10–15)
GLUCOSE UR STRIP-MCNC: NEGATIVE MG/DL
HCT VFR BLD AUTO: 31.4 % (ref 35–47)
HGB BLD-MCNC: 11 G/DL (ref 11.7–15.7)
HGB UR QL STRIP: NEGATIVE
IMM GRANULOCYTES # BLD: 0.1 10E9/L (ref 0–0.4)
IMM GRANULOCYTES NFR BLD: 0.9 %
KETONES UR STRIP-MCNC: 10 MG/DL
LEUKOCYTE ESTERASE UR QL STRIP: NEGATIVE
LYMPHOCYTES # BLD AUTO: 2.2 10E9/L (ref 0.8–5.3)
LYMPHOCYTES NFR BLD AUTO: 16.7 %
MCH RBC QN AUTO: 31.2 PG (ref 26.5–33)
MCHC RBC AUTO-ENTMCNC: 35 G/DL (ref 31.5–36.5)
MCV RBC AUTO: 89 FL (ref 78–100)
MONOCYTES # BLD AUTO: 0.9 10E9/L (ref 0–1.3)
MONOCYTES NFR BLD AUTO: 6.9 %
MUCOUS THREADS #/AREA URNS LPF: PRESENT /LPF
NEUTROPHILS # BLD AUTO: 9.8 10E9/L (ref 1.6–8.3)
NEUTROPHILS NFR BLD AUTO: 73.7 %
NITRATE UR QL: NEGATIVE
NRBC # BLD AUTO: 0 10*3/UL
NRBC BLD AUTO-RTO: 0 /100
PH UR STRIP: 6.5 PH (ref 5–7)
PLATELET # BLD AUTO: 224 10E9/L (ref 150–450)
RBC # BLD AUTO: 3.53 10E12/L (ref 3.8–5.2)
RBC #/AREA URNS AUTO: <1 /HPF (ref 0–2)
SOURCE: ABNORMAL
SP GR UR STRIP: 1.01 (ref 1–1.03)
SPECIMEN EXP DATE BLD: NORMAL
SPECIMEN SOURCE: NORMAL
SQUAMOUS #/AREA URNS AUTO: <1 /HPF (ref 0–1)
UROBILINOGEN UR STRIP-MCNC: NORMAL MG/DL (ref 0–2)
WBC # BLD AUTO: 13.3 10E9/L (ref 4–11)
WBC #/AREA URNS AUTO: 1 /HPF (ref 0–5)
WET PREP SPEC: NORMAL

## 2020-03-05 PROCEDURE — 86850 RBC ANTIBODY SCREEN: CPT | Performed by: STUDENT IN AN ORGANIZED HEALTH CARE EDUCATION/TRAINING PROGRAM

## 2020-03-05 PROCEDURE — 87491 CHLMYD TRACH DNA AMP PROBE: CPT | Performed by: OBSTETRICS & GYNECOLOGY

## 2020-03-05 PROCEDURE — 87653 STREP B DNA AMP PROBE: CPT | Performed by: OBSTETRICS & GYNECOLOGY

## 2020-03-05 PROCEDURE — 71000015 ZZH RECOVERY PHASE 1 LEVEL 2 EA ADDTL HR: Performed by: OBSTETRICS & GYNECOLOGY

## 2020-03-05 PROCEDURE — 86901 BLOOD TYPING SEROLOGIC RH(D): CPT | Performed by: STUDENT IN AN ORGANIZED HEALTH CARE EDUCATION/TRAINING PROGRAM

## 2020-03-05 PROCEDURE — 81001 URINALYSIS AUTO W/SCOPE: CPT | Performed by: OBSTETRICS & GYNECOLOGY

## 2020-03-05 PROCEDURE — 85025 COMPLETE CBC W/AUTO DIFF WBC: CPT | Performed by: OBSTETRICS & GYNECOLOGY

## 2020-03-05 PROCEDURE — 25000128 H RX IP 250 OP 636

## 2020-03-05 PROCEDURE — 76817 TRANSVAGINAL US OBSTETRIC: CPT

## 2020-03-05 PROCEDURE — 25800030 ZZH RX IP 258 OP 636

## 2020-03-05 PROCEDURE — 37000009 ZZH ANESTHESIA TECHNICAL FEE, EACH ADDTL 15 MIN: Performed by: OBSTETRICS & GYNECOLOGY

## 2020-03-05 PROCEDURE — 37000008 ZZH ANESTHESIA TECHNICAL FEE, 1ST 30 MIN: Performed by: OBSTETRICS & GYNECOLOGY

## 2020-03-05 PROCEDURE — 36000047 ZZH SURGERY LEVEL 1 EA 15 ADDTL MIN - UMMC: Performed by: OBSTETRICS & GYNECOLOGY

## 2020-03-05 PROCEDURE — 86900 BLOOD TYPING SEROLOGIC ABO: CPT | Performed by: STUDENT IN AN ORGANIZED HEALTH CARE EDUCATION/TRAINING PROGRAM

## 2020-03-05 PROCEDURE — 71000014 ZZH RECOVERY PHASE 1 LEVEL 2 FIRST HR: Performed by: OBSTETRICS & GYNECOLOGY

## 2020-03-05 PROCEDURE — G0378 HOSPITAL OBSERVATION PER HR: HCPCS

## 2020-03-05 PROCEDURE — 36000045 ZZH SURGERY LEVEL 1 1ST 30 MIN - UMMC: Performed by: OBSTETRICS & GYNECOLOGY

## 2020-03-05 PROCEDURE — 25000132 ZZH RX MED GY IP 250 OP 250 PS 637: Performed by: STUDENT IN AN ORGANIZED HEALTH CARE EDUCATION/TRAINING PROGRAM

## 2020-03-05 PROCEDURE — 25000128 H RX IP 250 OP 636: Performed by: OBSTETRICS & GYNECOLOGY

## 2020-03-05 PROCEDURE — 25800030 ZZH RX IP 258 OP 636: Performed by: OBSTETRICS & GYNECOLOGY

## 2020-03-05 PROCEDURE — 27210794 ZZH OR GENERAL SUPPLY STERILE: Performed by: OBSTETRICS & GYNECOLOGY

## 2020-03-05 PROCEDURE — 87591 N.GONORRHOEAE DNA AMP PROB: CPT | Performed by: OBSTETRICS & GYNECOLOGY

## 2020-03-05 PROCEDURE — 87210 SMEAR WET MOUNT SALINE/INK: CPT | Performed by: OBSTETRICS & GYNECOLOGY

## 2020-03-05 PROCEDURE — 40000170 ZZH STATISTIC PRE-PROCEDURE ASSESSMENT II: Performed by: OBSTETRICS & GYNECOLOGY

## 2020-03-05 RX ORDER — CITRIC ACID/SODIUM CITRATE 334-500MG
30 SOLUTION, ORAL ORAL ONCE
Status: DISCONTINUED | OUTPATIENT
Start: 2020-03-05 | End: 2020-03-05

## 2020-03-05 RX ORDER — NALOXONE HYDROCHLORIDE 0.4 MG/ML
.1-.4 INJECTION, SOLUTION INTRAMUSCULAR; INTRAVENOUS; SUBCUTANEOUS
Status: DISCONTINUED | OUTPATIENT
Start: 2020-03-05 | End: 2020-03-05

## 2020-03-05 RX ORDER — INDOMETHACIN 25 MG/1
50 CAPSULE ORAL EVERY 8 HOURS
Status: DISCONTINUED | OUTPATIENT
Start: 2020-03-05 | End: 2020-03-06 | Stop reason: HOSPADM

## 2020-03-05 RX ORDER — ACETAMINOPHEN 325 MG/1
975 TABLET ORAL ONCE
Status: DISCONTINUED | OUTPATIENT
Start: 2020-03-05 | End: 2020-03-05

## 2020-03-05 RX ORDER — OXYCODONE HYDROCHLORIDE 5 MG/1
5 TABLET ORAL EVERY 4 HOURS PRN
Status: DISCONTINUED | OUTPATIENT
Start: 2020-03-05 | End: 2020-03-05

## 2020-03-05 RX ORDER — LIDOCAINE 40 MG/G
CREAM TOPICAL
Status: DISCONTINUED | OUTPATIENT
Start: 2020-03-05 | End: 2020-03-06 | Stop reason: HOSPADM

## 2020-03-05 RX ORDER — CEFAZOLIN SODIUM 1 G/3ML
1 INJECTION, POWDER, FOR SOLUTION INTRAMUSCULAR; INTRAVENOUS EVERY 8 HOURS
Status: DISCONTINUED | OUTPATIENT
Start: 2020-03-06 | End: 2020-03-06 | Stop reason: HOSPADM

## 2020-03-05 RX ORDER — ONDANSETRON 2 MG/ML
INJECTION INTRAMUSCULAR; INTRAVENOUS PRN
Status: DISCONTINUED | OUTPATIENT
Start: 2020-03-05 | End: 2020-03-05

## 2020-03-05 RX ORDER — FAMOTIDINE 10 MG
10 TABLET ORAL 2 TIMES DAILY
Status: DISCONTINUED | OUTPATIENT
Start: 2020-03-05 | End: 2020-03-06 | Stop reason: HOSPADM

## 2020-03-05 RX ORDER — ONDANSETRON 2 MG/ML
4 INJECTION INTRAMUSCULAR; INTRAVENOUS EVERY 30 MIN PRN
Status: DISCONTINUED | OUTPATIENT
Start: 2020-03-05 | End: 2020-03-05

## 2020-03-05 RX ORDER — CEFAZOLIN SODIUM 2 G/100ML
2 INJECTION, SOLUTION INTRAVENOUS
Status: COMPLETED | OUTPATIENT
Start: 2020-03-05 | End: 2020-03-05

## 2020-03-05 RX ORDER — ONDANSETRON 4 MG/1
4 TABLET, ORALLY DISINTEGRATING ORAL EVERY 30 MIN PRN
Status: DISCONTINUED | OUTPATIENT
Start: 2020-03-05 | End: 2020-03-05

## 2020-03-05 RX ORDER — FENTANYL CITRATE 50 UG/ML
25-50 INJECTION, SOLUTION INTRAMUSCULAR; INTRAVENOUS
Status: DISCONTINUED | OUTPATIENT
Start: 2020-03-05 | End: 2020-03-05

## 2020-03-05 RX ORDER — NALBUPHINE HYDROCHLORIDE 10 MG/ML
2.5-5 INJECTION, SOLUTION INTRAMUSCULAR; INTRAVENOUS; SUBCUTANEOUS EVERY 6 HOURS PRN
Status: DISCONTINUED | OUTPATIENT
Start: 2020-03-05 | End: 2020-03-05

## 2020-03-05 RX ORDER — LIDOCAINE 40 MG/G
CREAM TOPICAL
Status: DISCONTINUED | OUTPATIENT
Start: 2020-03-05 | End: 2020-03-05

## 2020-03-05 RX ORDER — SODIUM CHLORIDE, SODIUM LACTATE, POTASSIUM CHLORIDE, CALCIUM CHLORIDE 600; 310; 30; 20 MG/100ML; MG/100ML; MG/100ML; MG/100ML
INJECTION, SOLUTION INTRAVENOUS CONTINUOUS
Status: DISCONTINUED | OUTPATIENT
Start: 2020-03-05 | End: 2020-03-05

## 2020-03-05 RX ORDER — ACETAMINOPHEN 325 MG/1
975 TABLET ORAL EVERY 4 HOURS PRN
Status: DISCONTINUED | OUTPATIENT
Start: 2020-03-05 | End: 2020-03-06 | Stop reason: HOSPADM

## 2020-03-05 RX ORDER — HYDROMORPHONE HYDROCHLORIDE 1 MG/ML
.3-.5 INJECTION, SOLUTION INTRAMUSCULAR; INTRAVENOUS; SUBCUTANEOUS EVERY 5 MIN PRN
Status: DISCONTINUED | OUTPATIENT
Start: 2020-03-05 | End: 2020-03-05

## 2020-03-05 RX ORDER — DIPHENHYDRAMINE HYDROCHLORIDE 50 MG/ML
25 INJECTION INTRAMUSCULAR; INTRAVENOUS EVERY 6 HOURS PRN
Status: DISCONTINUED | OUTPATIENT
Start: 2020-03-05 | End: 2020-03-06 | Stop reason: HOSPADM

## 2020-03-05 RX ORDER — ONDANSETRON 2 MG/ML
4 INJECTION INTRAMUSCULAR; INTRAVENOUS EVERY 6 HOURS PRN
Status: DISCONTINUED | OUTPATIENT
Start: 2020-03-05 | End: 2020-03-06 | Stop reason: HOSPADM

## 2020-03-05 RX ORDER — PHENYLEPHRINE HCL IN 0.9% NACL 1 MG/10 ML
SYRINGE (ML) INTRAVENOUS CONTINUOUS PRN
Status: DISCONTINUED | OUTPATIENT
Start: 2020-03-05 | End: 2020-03-05

## 2020-03-05 RX ORDER — SODIUM CHLORIDE, SODIUM LACTATE, POTASSIUM CHLORIDE, CALCIUM CHLORIDE 600; 310; 30; 20 MG/100ML; MG/100ML; MG/100ML; MG/100ML
INJECTION, SOLUTION INTRAVENOUS CONTINUOUS PRN
Status: DISCONTINUED | OUTPATIENT
Start: 2020-03-05 | End: 2020-03-05

## 2020-03-05 RX ORDER — EPHEDRINE SULFATE 50 MG/ML
5 INJECTION, SOLUTION INTRAMUSCULAR; INTRAVENOUS; SUBCUTANEOUS
Status: DISCONTINUED | OUTPATIENT
Start: 2020-03-05 | End: 2020-03-05

## 2020-03-05 RX ORDER — DIPHENHYDRAMINE HCL 25 MG
25 CAPSULE ORAL EVERY 6 HOURS PRN
Status: DISCONTINUED | OUTPATIENT
Start: 2020-03-05 | End: 2020-03-06 | Stop reason: HOSPADM

## 2020-03-05 RX ORDER — BUPIVACAINE HYDROCHLORIDE 7.5 MG/ML
1.4 INJECTION, SOLUTION EPIDURAL; RETROBULBAR ONCE
Status: DISCONTINUED | OUTPATIENT
Start: 2020-03-05 | End: 2020-03-05

## 2020-03-05 RX ORDER — BUPIVACAINE HYDROCHLORIDE 7.5 MG/ML
INJECTION, SOLUTION INTRASPINAL PRN
Status: DISCONTINUED | OUTPATIENT
Start: 2020-03-05 | End: 2020-03-05

## 2020-03-05 RX ORDER — LABETALOL 20 MG/4 ML (5 MG/ML) INTRAVENOUS SYRINGE
10
Status: DISCONTINUED | OUTPATIENT
Start: 2020-03-05 | End: 2020-03-05

## 2020-03-05 RX ORDER — CITRIC ACID/SODIUM CITRATE 334-500MG
SOLUTION, ORAL ORAL
Status: DISCONTINUED
Start: 2020-03-05 | End: 2020-03-05 | Stop reason: HOSPADM

## 2020-03-05 RX ORDER — MEPERIDINE HYDROCHLORIDE 25 MG/ML
12.5 INJECTION INTRAMUSCULAR; INTRAVENOUS; SUBCUTANEOUS
Status: DISCONTINUED | OUTPATIENT
Start: 2020-03-05 | End: 2020-03-05

## 2020-03-05 RX ADMIN — FAMOTIDINE 10 MG: 10 TABLET, FILM COATED ORAL at 21:39

## 2020-03-05 RX ADMIN — Medication 50 MCG/MIN: at 19:57

## 2020-03-05 RX ADMIN — SODIUM CHLORIDE, POTASSIUM CHLORIDE, SODIUM LACTATE AND CALCIUM CHLORIDE: 600; 310; 30; 20 INJECTION, SOLUTION INTRAVENOUS at 19:05

## 2020-03-05 RX ADMIN — SODIUM CHLORIDE, POTASSIUM CHLORIDE, SODIUM LACTATE AND CALCIUM CHLORIDE: 600; 310; 30; 20 INJECTION, SOLUTION INTRAVENOUS at 19:43

## 2020-03-05 RX ADMIN — INDOMETHACIN 50 MG: 25 CAPSULE ORAL at 21:39

## 2020-03-05 RX ADMIN — ONDANSETRON 4 MG: 2 INJECTION INTRAMUSCULAR; INTRAVENOUS at 20:35

## 2020-03-05 RX ADMIN — BUPIVACAINE HYDROCHLORIDE IN DEXTROSE 1.4 ML: 7.5 INJECTION, SOLUTION SUBARACHNOID at 19:52

## 2020-03-05 RX ADMIN — CEFAZOLIN 2 G: 10 INJECTION, POWDER, FOR SOLUTION INTRAVENOUS at 20:00

## 2020-03-05 ASSESSMENT — MIFFLIN-ST. JEOR: SCORE: 1530.9

## 2020-03-05 NOTE — PROGRESS NOTES
"Please see \"Imaging\" tab under Chart Review for full details.    Obdulia Bal MD  Maternal Fetal Medicine    "

## 2020-03-06 ENCOUNTER — HOSPITAL ENCOUNTER (OUTPATIENT)
Facility: CLINIC | Age: 40
Setting detail: OBSERVATION
End: 2020-03-06
Admitting: OBSTETRICS & GYNECOLOGY
Payer: COMMERCIAL

## 2020-03-06 ENCOUNTER — HOSPITAL ENCOUNTER (OUTPATIENT)
Dept: ULTRASOUND IMAGING | Facility: CLINIC | Age: 40
Setting detail: OBSERVATION
End: 2020-03-06
Payer: COMMERCIAL

## 2020-03-06 VITALS
WEIGHT: 178 LBS | TEMPERATURE: 97.7 F | BODY MASS INDEX: 26.98 KG/M2 | RESPIRATION RATE: 18 BRPM | OXYGEN SATURATION: 99 % | HEART RATE: 79 BPM | SYSTOLIC BLOOD PRESSURE: 121 MMHG | HEIGHT: 68 IN | DIASTOLIC BLOOD PRESSURE: 67 MMHG

## 2020-03-06 DIAGNOSIS — O26.879 SHORT CERVIX AFFECTING PREGNANCY: Primary | ICD-10-CM

## 2020-03-06 LAB
BASOPHILS # BLD AUTO: 0 10E9/L (ref 0–0.2)
BASOPHILS NFR BLD AUTO: 0.1 %
C TRACH DNA SPEC QL NAA+PROBE: NEGATIVE
DIFFERENTIAL METHOD BLD: ABNORMAL
EOSINOPHIL # BLD AUTO: 0.1 10E9/L (ref 0–0.7)
EOSINOPHIL NFR BLD AUTO: 0.7 %
ERYTHROCYTE [DISTWIDTH] IN BLOOD BY AUTOMATED COUNT: 13 % (ref 10–15)
GP B STREP DNA SPEC QL NAA+PROBE: NEGATIVE
HCT VFR BLD AUTO: 30 % (ref 35–47)
HGB BLD-MCNC: 10.3 G/DL (ref 11.7–15.7)
IMM GRANULOCYTES # BLD: 0.1 10E9/L (ref 0–0.4)
IMM GRANULOCYTES NFR BLD: 1 %
LYMPHOCYTES # BLD AUTO: 2 10E9/L (ref 0.8–5.3)
LYMPHOCYTES NFR BLD AUTO: 14.8 %
MCH RBC QN AUTO: 30.7 PG (ref 26.5–33)
MCHC RBC AUTO-ENTMCNC: 34.3 G/DL (ref 31.5–36.5)
MCV RBC AUTO: 89 FL (ref 78–100)
MONOCYTES # BLD AUTO: 0.9 10E9/L (ref 0–1.3)
MONOCYTES NFR BLD AUTO: 6.7 %
N GONORRHOEA DNA SPEC QL NAA+PROBE: NEGATIVE
NEUTROPHILS # BLD AUTO: 10.2 10E9/L (ref 1.6–8.3)
NEUTROPHILS NFR BLD AUTO: 76.7 %
NRBC # BLD AUTO: 0 10*3/UL
NRBC BLD AUTO-RTO: 0 /100
PLATELET # BLD AUTO: 206 10E9/L (ref 150–450)
RBC # BLD AUTO: 3.36 10E12/L (ref 3.8–5.2)
SPECIMEN SOURCE: NORMAL
WBC # BLD AUTO: 13.2 10E9/L (ref 4–11)

## 2020-03-06 PROCEDURE — 76817 TRANSVAGINAL US OBSTETRIC: CPT

## 2020-03-06 PROCEDURE — 85025 COMPLETE CBC W/AUTO DIFF WBC: CPT | Performed by: STUDENT IN AN ORGANIZED HEALTH CARE EDUCATION/TRAINING PROGRAM

## 2020-03-06 PROCEDURE — G0378 HOSPITAL OBSERVATION PER HR: HCPCS

## 2020-03-06 PROCEDURE — 36415 COLL VENOUS BLD VENIPUNCTURE: CPT | Performed by: STUDENT IN AN ORGANIZED HEALTH CARE EDUCATION/TRAINING PROGRAM

## 2020-03-06 PROCEDURE — 25000128 H RX IP 250 OP 636: Performed by: STUDENT IN AN ORGANIZED HEALTH CARE EDUCATION/TRAINING PROGRAM

## 2020-03-06 PROCEDURE — 25000132 ZZH RX MED GY IP 250 OP 250 PS 637: Performed by: STUDENT IN AN ORGANIZED HEALTH CARE EDUCATION/TRAINING PROGRAM

## 2020-03-06 RX ADMIN — ACETAMINOPHEN 975 MG: 325 TABLET, FILM COATED ORAL at 11:36

## 2020-03-06 RX ADMIN — CEFAZOLIN 1 G: 1 INJECTION, POWDER, FOR SOLUTION INTRAMUSCULAR; INTRAVENOUS at 04:20

## 2020-03-06 RX ADMIN — INDOMETHACIN 50 MG: 25 CAPSULE ORAL at 05:30

## 2020-03-06 RX ADMIN — INDOMETHACIN 50 MG: 25 CAPSULE ORAL at 13:32

## 2020-03-06 RX ADMIN — FAMOTIDINE 10 MG: 10 TABLET, FILM COATED ORAL at 08:26

## 2020-03-06 RX ADMIN — CEFAZOLIN 1 G: 1 INJECTION, POWDER, FOR SOLUTION INTRAMUSCULAR; INTRAVENOUS at 12:01

## 2020-03-06 NOTE — BRIEF OP NOTE
[facility], [place]    Brief Operative Note    Pre-operative diagnosis:   - Dichorionic diamniotic twin pregnancy in second trimester   - Cervical insufficiency during pregnancy in second trimester, antepartum     Post-operative diagnosis   - Same as above s/p procedure below     Procedure: Procedure(s):  CERCLAGE, CERVIX, VAGINAL APPROACH  Surgeon: Surgeon(s) and Role:     * Brady Trujillo MD - Primary     * Cecilia Prince MD - Resident - Assisting  Anesthesia: Spinal   Estimated blood loss: 5 ml   UOP: minimal drained at beginning and end of case  IVF: 400 ml crystalloid   Drains: None  Specimens: None  Findings:     On speculum exam cervix visually 1.5 cm dilated with membranes flush with external os. Following procedure cervical length approximately 2.5-3 cm long. Cervix closed on digital exam at end of case. Surgical sites hemostatic.      Complications: None.      Cecilia Prince MD   OB/GYN Resident PGY-3  3/5/2020 8:43 PM

## 2020-03-06 NOTE — PLAN OF CARE
Data: Today is hospital day one. Maternal status is normal. Fetal assessment is is normal.  Action: Continue with plan of care, which is continuous uterine monitoring. Patient encouraged to move extremities while in bed.  Response: Patient coping with plan of care and will be discharged home later if still stable.

## 2020-03-06 NOTE — ANESTHESIA POSTPROCEDURE EVALUATION
Anesthesia POST Procedure Evaluation    Patient: Cristin Huerta   MRN:     [id number] Gender:   female   Age:    39 year old :      1980        Preoperative Diagnosis: Dichorionic diamniotic twin pregnancy in second trimester [O30.042]  Cervical insufficiency during pregnancy in second trimester, antepartum [O34.32]   Procedure(s):  CERCLAGE, CERVIX, VAGINAL APPROACH   Postop Comments: No value filed.     Anesthesia Type: MAC, Spinal       Disposition: Admission   Postop Pain Control: Uneventful            Sign Out: Well controlled pain   PONV: No   Neuro/Psych: Uneventful            Sign Out: Acceptable/Baseline neuro status   Airway/Respiratory: Uneventful            Sign Out: Acceptable/Baseline resp. status   CV/Hemodynamics: Uneventful            Sign Out: Acceptable CV status   Other NRE: NONE   DID A NON-ROUTINE EVENT OCCUR? No         Last Anesthesia Record Vitals:  CRNA VITALS  3/5/2020 2006 - 3/5/2020 2050      3/5/2020             Pulse:  81    SpO2:  97 %          Last PACU Vitals:  Vitals Value Taken Time   BP     Temp     Pulse     Resp     SpO2     Temp src     NIBP     Pulse 81 3/5/2020  8:36 PM   SpO2 97 % 3/5/2020  8:36 PM   Resp     Temp     Ht Rate     Temp 2           Electronically Signed By: John Barrera MD, 2020, 8:50 PM

## 2020-03-06 NOTE — ANESTHESIA PROCEDURE NOTES
Spinal/LP Procedure Note    Spinal Block  Date/Time: 3/5/2020 7:52 PM  Staff:     Anesthesiologist:  John Barrera MD    Resident/CRNA:  Jaron Maguire MD  Location: In OR BEFORE Induction  Procedure Start/Stop Times:      3/5/2020 7:50 PM     3/5/2020 7:53 PM    patient identified, IV checked, site marked, risks and benefits discussed, informed consent, monitors and equipment checked, pre-op evaluation, at physician/surgeon's request and post-op pain management      Correct Patient: Yes      Correct Position: Yes      Correct Site: Yes      Correct Procedure: Yes      Correct Laterality:  Yes    Site Marked:  Yes  Procedure:     Procedure:  Intrathecal    ASA:  2    Position:  Sitting    Insertion site:  L3-4    Approach:  Midline    Needle Type:  Quincke    Needle gauge (G):  25    Local Skin Infiltration:  1% lidocaine    amount (ml):  2    Needle Length (in):  3.5    Introducer used: Yes      Attempts:  1    Redirects:  0    CSF:  Clear    Time injected:  19:52

## 2020-03-06 NOTE — PROGRESS NOTES
Acupuncture Clinical Internship Intake and Treatment Documentation   [facility]    Date:  3/6/2020  Patient Name:  Cristin Huerta   YOB: 1980     Repeat Patient:  no  Has patient had acupoint/acupressure treatment before:  yes    Signed consent placed in the medical record:  yes  Patient/Family verbalizes understanding of risks and benefits:  yes  Required information provided to patient:  yes    Diagnosis:  Cervical insufficiency during pregnancy in second trimester, antepartum  Maternity*EVA   Cervical insufficiency during pregnancy in second trimester, antepartum  Cervical insufficiency during pregnancy in second trimester, antepartum    Patient condition and treatment:  Cervical insufficiency during pregnancy in second trimester, antepartum  Reason for Intervention Today/Chief Complaint:  Low back pain and round ligament tighness    Isolation:  No  Type:  None    PRE-SCORE:  mild    Other Western medical information:  na    Medications  No current outpatient medications on file.     Current Facility-Administered Medications:      acetaminophen (TYLENOL) tablet 975 mg, 975 mg, Oral, Q4H PRN, Cecilia Prince MD, 975 mg at 03/06/20 1136     ceFAZolin (ANCEF) 1 g vial to attach to  ml bag for ADULT or 50 ml bag for PEDS, 1 g, Intravenous, Q8H, Cecilia Prince MD, 1 g at 03/06/20 1201     diphenhydrAMINE (BENADRYL) capsule 25 mg, 25 mg, Oral, Q6H PRN **OR** diphenhydrAMINE (BENADRYL) injection 25 mg, 25 mg, Intravenous, Q6H PRN, Cecilia Prince MD     famotidine (PEPCID) tablet 10 mg, 10 mg, Oral, BID, Cecilia Prince MD, 10 mg at 03/06/20 0826     indomethacin (INDOCIN) capsule 50 mg, 50 mg, Oral, Q8H, Cecilia Prince MD, 50 mg at 03/06/20 1332     lidocaine (LMX4) cream, , Topical, Q1H PRN, Cecilia Prince MD     lidocaine 1 % 1 mL, 1 mL, Other, Q1H PRN, Cecilia Prince MD     No Tdap Needed - Assessment: Patient does not need Tdap vaccine, , Does not apply, Continuous PRN,  Cecilia Prince MD     ondansetron (ZOFRAN) injection 4 mg, 4 mg, Intravenous, Q6H PRN, Cecilia Prince MD     sodium chloride (PF) 0.9% PF flush 3 mL, 3 mL, Intracatheter, Q1H PRN, Cecilia Prince MD     sodium chloride (PF) 0.9% PF flush 3 mL, 3 mL, Intracatheter, Q8H, Cecilia Prince MD, 3 mL at 03/06/20 0827      Pre-Treatment Assessment  Chief Complaint/ Reason for Intervention Today:  Low back pain and round ligament tightness  Chief Complaint Pre-Score:  mild   Describe:  Bilateral low back pain that is dull. Due to different beds and not being able to find a comfortable position. The pain is local. Light heat can help. Bilateral round ligament tightness. Worsening due to not being able to stretch. Fear of tightening.   Pain Location:  Low back and round ligaments  Pre Session Pain:  Mild  Pre Session Anxiety:  None  Pre Session Nausea:  None    10 Traditional Chinese Medicine Assessment Questions  - Cold/ Heat:  No recent chills or fever. Tends to run cold.  - Sweat:  Sweating in the palms and feet that is random. No night sweats.  - Headaches/Body aches:  Occasional headache when she wakes up in the frontal area. Can go away with Tylenol.   - Chest/Abdomen:  No chest pain or tightness. Light abdominal cramping since procedure.   - Digestion:  Appetite is good. Has belching.  - Bowel Movement/Urination:  Urination is normal. Has been constipated for weeks now that is hard and does not have the urgency.   - Hearing/Vision:  Hearing and vision is normal.  - Sleep (prior to hospital):  Sleep was good once she found a comfortable position. Would wake rested. Lately has been getting vivid dreams.  - Energy:  Good.  - Emotions:  Good. Some hormonal tendencies.  - Ob Gyn:  Before pregnancy, regular with moderate cramping and no clots. Had IUD for years.   - Miscellaneous:  na    Traditional Chinese Medicine Assessment  - TONGUE:  Dry, red tip, teeth marks  - PULSE:  Full and slippery  - OBSERVATIONS:  na      Traditional Chinese Medicine Diagnosis  - BRANCH:  Back pain due to Qi/Blood Stagnation, Cervical Insufficiency due to Spleen Qi Falling  - ROOT:  Underlying Dalia/Kid Yin Def     Traditional Chinese Medicine Treatment  - ACUPUNCTURE:  Du 20, Si Zuniga Keny, Donald Tong Carine (L), TB 6 (L), Sp 3, GB 41  Auricular: Efrain Men, Lumbar  - NEEDLE COUNT: In: 16   Out: 16    Time In: 25 minutes     Magnet informed consent signed and given:  no    Post Treatment Assessment  Chief complaint post score:  better  Post Session Observation:  na  Patient/Family Education:  Suggested to continue to get treated after leaving the hospital.   Verbal information provided:  yes  Written information provided:  no  All questions answered at time of treatment:  yes    Treatment/Procedure(s) performed by:  Rehana Berry    Date: 3/6/2020     I attest that this acupuncture treatment was done under my supervision and this note is complete and true.     Supervising acupuncturist:  Johann Carter LAc [facility] license #: [id number]  P: [phone]

## 2020-03-06 NOTE — PLAN OF CARE
VSS.  No contractions. Denies pain.  Pt was able to ambulate to bathroom and void.  Has some spotting.

## 2020-03-06 NOTE — PROVIDER NOTIFICATION
03/05/20 1806   Provider Notification   Provider Name/Title Dr. Castellanos   Method of Notification At Bedside   Request Evaluate in Person   Notification Reason Patient Arrived   pt is here for cerclage assessment.

## 2020-03-06 NOTE — PROGRESS NOTES
"Maternal-Fetal Medicine Progress Note    Cristin Huerta MRN# [id number]   Age: 39 year old  Gestational age: 20w2d YOB: 1980       Date of Admission: 3/5/2020          Subjective:        Cristin is feeling well today.  She denies any LOF, VB or abdominal pain.  Had mild cramping in PACU, but none since.  Babies are active.            Objective:          Patient Vitals for the past 24 hrs:   BP Temp Temp src Pulse Heart Rate Resp SpO2 Height Weight   03/06/20 1128 121/67 97.7  F (36.5  C) Oral -- -- -- -- -- --   03/06/20 0800 121/70 97.6  F (36.4  C) Oral -- -- -- -- -- --   03/06/20 0539 119/62 -- -- -- -- 18 -- -- --   03/06/20 0104 110/55 98.4  F (36.9  C) Oral -- -- 18 -- -- --   03/05/20 2145 121/75 -- -- 79 77 20 99 % -- --   03/05/20 2130 119/67 -- -- 73 82 25 99 % -- --   03/05/20 2115 112/67 -- -- 83 -- 18 99 % -- --   03/05/20 2045 135/79 98  F (36.7  C) Oral 86 -- 18 99 % -- --   03/05/20 1759 124/83 98.1  F (36.7  C) Oral 88 -- 18 -- 1.727 m (5' 8\") 80.7 kg (178 lb)            LABS         Results for orders placed or performed during the hospital encounter of 03/05/20 (from the past 24 hour(s))   UA with Microscopic reflex to Culture   Result Value Ref Range    Color Urine Light Yellow     Appearance Urine Clear     Glucose Urine Negative NEG^Negative mg/dL    Bilirubin Urine Negative NEG^Negative    Ketones Urine 10 (A) NEG^Negative mg/dL    Specific Gravity Urine 1.011 1.003 - 1.035    Blood Urine Negative NEG^Negative    pH Urine 6.5 5.0 - 7.0 pH    Protein Albumin Urine Negative NEG^Negative mg/dL    Urobilinogen mg/dL Normal 0.0 - 2.0 mg/dL    Nitrite Urine Negative NEG^Negative    Leukocyte Esterase Urine Negative NEG^Negative    Source Clean catch urine     WBC Urine 1 0 - 5 /HPF    RBC Urine <1 0 - 2 /HPF    Bacteria Urine Few (A) NEG^Negative /HPF    Squamous Epithelial /HPF Urine <1 0 - 1 /HPF    Mucous Urine Present (A) NEG^Negative /LPF   CBC with platelets differential   Result " Value Ref Range    WBC 13.3 (H) 4.0 - 11.0 10e9/L    RBC Count 3.53 (L) 3.8 - 5.2 10e12/L    Hemoglobin 11.0 (L) 11.7 - 15.7 g/dL    Hematocrit 31.4 (L) 35.0 - 47.0 %    MCV 89 78 - 100 fl    MCH 31.2 26.5 - 33.0 pg    MCHC 35.0 31.5 - 36.5 g/dL    RDW 12.9 10.0 - 15.0 %    Platelet Count 224 150 - 450 10e9/L    Diff Method Automated Method     % Neutrophils 73.7 %    % Lymphocytes 16.7 %    % Monocytes 6.9 %    % Eosinophils 1.7 %    % Basophils 0.1 %    % Immature Granulocytes 0.9 %    Nucleated RBCs 0 0 /100    Absolute Neutrophil 9.8 (H) 1.6 - 8.3 10e9/L    Absolute Lymphocytes 2.2 0.8 - 5.3 10e9/L    Absolute Monocytes 0.9 0.0 - 1.3 10e9/L    Absolute Eosinophils 0.2 0.0 - 0.7 10e9/L    Absolute Basophils 0.0 0.0 - 0.2 10e9/L    Abs Immature Granulocytes 0.1 0 - 0.4 10e9/L    Absolute Nucleated RBC 0.0    ABO/Rh type and screen   Result Value Ref Range    ABO O     RH(D) Pos     Antibody Screen Neg     Test Valid Only At          [facility]    Specimen Expires 03/08/2020    Wet prep   Result Value Ref Range    Specimen Description Vagina     Wet Prep No motile Trichomonas seen     Wet Prep No yeast seen     Wet Prep Rare  PMNs seen       Wet Prep No clue cells seen    Chlamydia trachomatis PCR   Result Value Ref Range    Specimen Description Cervix     Chlamydia Trachomatis PCR Negative NEG^Negative   Neisseria gonorrhoeae PCR   Result Value Ref Range    Specimen Descrip Cervix     N Gonorrhea PCR Negative NEG^Negative   CBC with platelets differential   Result Value Ref Range    WBC 13.2 (H) 4.0 - 11.0 10e9/L    RBC Count 3.36 (L) 3.8 - 5.2 10e12/L    Hemoglobin 10.3 (L) 11.7 - 15.7 g/dL    Hematocrit 30.0 (L) 35.0 - 47.0 %    MCV 89 78 - 100 fl    MCH 30.7 26.5 - 33.0 pg    MCHC 34.3 31.5 - 36.5 g/dL    RDW 13.0 10.0 - 15.0 %    Platelet Count 206 150 - 450 10e9/L    Diff Method Automated Method     % Neutrophils 76.7 %    % Lymphocytes 14.8 %    % Monocytes 6.7 %     % Eosinophils 0.7 %    % Basophils 0.1 %    % Immature Granulocytes 1.0 %    Nucleated RBCs 0 0 /100    Absolute Neutrophil 10.2 (H) 1.6 - 8.3 10e9/L    Absolute Lymphocytes 2.0 0.8 - 5.3 10e9/L    Absolute Monocytes 0.9 0.0 - 1.3 10e9/L    Absolute Eosinophils 0.1 0.0 - 0.7 10e9/L    Absolute Basophils 0.0 0.0 - 0.2 10e9/L    Abs Immature Granulocytes 0.1 0 - 0.4 10e9/L    Absolute Nucleated RBC 0.0    Maternal Fetal US OB Transvaginal    Narrative            Cx TV  ---------------------------------------------------------------------------------------------------------  Pat. Name: PREMA OSEI       Study Date:  2020 7:57am  Pat. NO:  [id number]        Referring  MD: JUAN ALSTON  Site:  [facility]       Sonographer: Samantha White RDMS  :  1980        Age:   39  ---------------------------------------------------------------------------------------------------------    INDICATION  ---------------------------------------------------------------------------------------------------------  Dichorionic, Diamniotic Twin gestation S/P cerclage placement done 3/5/20      METHOD  ---------------------------------------------------------------------------------------------------------  [facility] ANTEPARTUM inpatient exam. Transabdominal and transvaginal ultrasound examination. View: Sufficient      PREGNANCY  ---------------------------------------------------------------------------------------------------------  Twin pregnancy. Dichorionic-diamniotic. Number of fetuses: 2      DATING  ---------------------------------------------------------------------------------------------------------                                           Date                                Details                                                                                      Gest. age                      EVA  Conception                                                               Conception: IVF  Embryo transfer                   11/3/2019                        IVF / ET: 3 d                                                                               20 w + 1 d                     7/23/2020  Prior assessment               12/20/2019                       GA: 9 w + 1 d                                                                            20 w + 1 d                     7/23/2020  Assigned dating                  Dating performed on 03/2/2020, based on the IVF / ET date                                                  20 w + 1 d                     7/23/2020      Fetus 1: GENERAL EVALUATION  ---------------------------------------------------------------------------------------------------------  Cardiac activity present.  bpm.  Fetal movements visualized.  Presentation cephalic, presenting, maternal left.  Placenta Placental site: anterior, thick dividing membrane.  Umbilical cord previously studied.  Amniotic fluid Amount of AF: normal. MVP 4.4 cm.      Fetus 2: GENERAL EVALUATION  ---------------------------------------------------------------------------------------------------------  Cardiac activity present.  bpm.  Fetal movements visualized.  Presentation cephalic, maternal right .  Placenta Placental site: posterior, thick dividing membrane.  Umbilical cord previously studied.  Amniotic fluid Amount of AF: normal. MVP 5.2 cm.      MATERNAL STRUCTURES  ---------------------------------------------------------------------------------------------------------  Cervix                                  Normal                                             Appearance: Appears closed.                                             Approach - Transvaginal: Cervical length 29.9 mm                                             Funneling present                                             Cerclage: Cervix pre-stitch with fundal pressure 9.3 mm, Cervix post-stitch with fundal pressure 20.6  "mm      RECOMMENDATION  ---------------------------------------------------------------------------------------------------------  We discussed the findings on today's ultrasound with the patient.    A repeat ultrasound has been scheduled here in 1 week to reevaluate cerclage. As long as there is no evidence of funneling through the cerclage, then no further US to  monitor cerclage are recommended. Please see EPIC for details of Cristin's hospitalization.    Return to primary provider for continued prenatal care.    Thank you for the opportunity to participate in the care of this patient. If you have questions regarding today's evaluation or if we can be of further service, please contact the  [facility] Center.    **Fetal anomalies may be present but not detected**        Impression    IMPRESSION  ---------------------------------------------------------------------------------------------------------  1) Diamniotic dichorionic twin pregnancy at 20 1/7 weeks gestational age.  2) The amniotic fluid measurement is within normal limits.  3) The cervical length measurement is within the normal range. There is no evidence of funneling beyond the level of the cerclage. The cerclage appears satisfactorily in  place x 2.              Abdomen: Gravid, Soft, Non-tender            Please see \"Imaging\" tab under \"Chart Review\" for details of today's US.            Tocometer: Quiet         Assessment/Plan:        39 year old y.o.  at 20w2d di-di twin gestation admitted with previable cervical dilation, POD #1 s/p rescue cerclage placement  Plan 24 hours of Ancef and Indocin.    Both maternal and fetal status currently stable and reassuring.  If continues stable into late afternoon, will plan discharge home at that time.  Follow up [facility] clinic next week.  If no funneling past cerclage, then no further US for cerclage.  Recommend cerclage removal at 36 weeks, or sooner if evidence of labor.   Continue regular follow " up with Dr. Jang.    Brady Trujillo MD    Time Spent on this Encounter   I spent 15 minutes on the unit/floor managing the care of Cristin Huerta. Over 50% of my time was spent on the following:   - Counseling the patient and/or family regarding: diagnostic results, prognosis, recommended follow-up and medical compliance  - Coordination of care with the: nurse    In summary, Cristin Huerta is a  at 20w2d admitted with previable cervical dilation s/p rescue cerclage yesterday.  No evidence of  labor or post-operative complications.  Plan discharge home later today if continues to meet goals and maternal and fetal status continue to be stable and reassuring.    Brady Trujillo MD

## 2020-03-06 NOTE — DISCHARGE INSTRUCTIONS
Discharge Instruction for Undelivered Patients      You were seen for: cervical isufficiency  We Consulted: Dr Trujillo  You had (Test or Medicine):Cerclage done  Diet:   Drink 8 to 12 glasses of liquids (milk, juice, water) every day.  You may eat meals and snacks.     Activity:  Rest the pelvic area. No sex. Do not stimulate breasts or nipples.  Count fetal kicks everyday (see handout)  Call your doctor or nurse midwife if your baby is moving less than usual.     Call your provider if you notice:  Swelling in your face or increased swelling in your hands or legs.  Headaches that are not relieved by Tylenol (acetaminophen).  Changes in your vision (blurring: seeing spots or stars.)  Nausea (sick to your stomach) and vomiting (throwing up).   Weight gain of 5 pounds or more per week.  Heartburn that doesn't go away.  Signs of bladder infection: pain when you urinate (use the toilet), need to go more often and more urgently.  The bag of luevano (rupture of membranes) breaks, or you notice leaking in your underwear.  Bright red blood in your underwear.  Abdominal (lower belly) or stomach pain.  For first baby: Contractions (tightening) less than 5 minutes apart for one hour or more.  Second (plus) baby: Contractions (tightening) less than 10 minutes apart and getting stronger.  *If less than 34 weeks: Contractions (tightenings) more than 6 times in one hour.  Increase or change in vaginal discharge (note the color and amount)  Other:     Follow-up:  As scheduled in the clinic

## 2020-03-06 NOTE — H&P
"Antepartum History and Physical   2020  Cristin Huerta  [id number]      HPI: Cristin Huerta is a 39 year old  at 20w1d by IVF dating US who was sent from [facility] clinic in [place] today.  She states she was seen by [facility] this Monday for her anatomy US and the cervix was incidentally noted to be short (16-20 mm).  She was asymptomatic and therefore was started on vaginal progesterone at that time and was scheduled for re-assessment today.  On today's exam, her cervix measured ~4 mm, but on SSE, she was noted to be ~FT dilated with visible membranes of twin A at the external os.  She denies contractions, loss of fluid, vaginal bleeding, abnormal vaginal discharge.  She endoreses intermittent \"twinges\" in the groin, which have been present for a few months.      She states that she is feeling well today.  She denies headache, vision changes, chest pain, shortness of breath, fever, chills, nausea, vomiting or other systemic complaints. She denies vaginal bleeding or loss of fluid and is feeling normal fetal movement. She does have a history of LEEP procedure in .    Her pregnancy has been complicated by:  - Diamniotic-dichorionic twin gestation    OBHX:   OB History    Para Term  AB Living   1 0 0 0 0 0   SAB TAB Ectopic Multiple Live Births   0 0 0 0 0      # Outcome Date GA Lbr Ariel/2nd Weight Sex Delivery Anes PTL Lv   1 Current                MedicalHX:   Past Medical History:   Diagnosis Date     Depression        SurgicalHX:   Past Surgical History:   Procedure Laterality Date     EYE SURGERY      jaw surgery     GYN SURGERY      colposcopy, LEEP, R tubal removal, D and C     ORTHOPEDIC SURGERY      wrist     Medications:   No current facility-administered medications on file prior to encounter.   aspirin (ASA) 81 MG chewable tablet, Take 81 mg by mouth daily  Prenat w/o Y-ST-Pxtbbtu-FA-DHA (PNV-DHA PO),   progesterone (PROMETRIUM) 200 MG capsule, Place 1 capsule (200 mg) vaginally daily  VITAMIN " "D PO, Take 5,000 Int'l Units by mouth    Allergies:  No Known Allergies    FamilyHX:  Family History   Problem Relation Age of Onset     Colon Cancer Maternal Grandmother      Liver Cancer Maternal Grandfather      Lymphoma Maternal Grandfather      Colon Cancer Paternal Grandfather      SocialHX:   Social History     Socioeconomic History     Marital status:      Spouse name: None     Number of children: None     Years of education: None     Highest education level: None   Occupational History     None   Social Needs     Financial resource strain: None     Food insecurity:     Worry: None     Inability: None     Transportation needs:     Medical: None     Non-medical: None   Tobacco Use     Smoking status: Former Smoker     Smokeless tobacco: Never Used     Tobacco comment: Quit 2005   Substance and Sexual Activity     Alcohol use: Not Currently     Drug use: Never     Sexual activity: Yes     Partners: Male     Birth control/protection: None   Lifestyle     Physical activity:     Days per week: None     Minutes per session: None     Stress: None   Relationships     Social connections:     Talks on phone: None     Gets together: None     Attends Mu-ism service: None     Active member of club or organization: None     Attends meetings of clubs or organizations: None     Relationship status: None     Intimate partner violence:     Fear of current or ex partner: None     Emotionally abused: None     Physically abused: None     Forced sexual activity: None   Other Topics Concern     Parent/sibling w/ CABG, MI or angioplasty before 65F 55M? Not Asked   Social History Narrative     None     ROS: 10-point ROS negative except as indicated in HPI.    Physical Exam:  Vitals:    03/05/20 1759   BP: 124/83   Pulse: 88   Resp: 18   Temp: 98.1  F (36.7  C)   TempSrc: Oral   Weight: 80.7 kg (178 lb)   Height: 1.727 m (5' 8\")     General: alert, oriented female, resting in bed in NAD  CV: regular rate and rhythm, normal " s1 and s2, no murmurs  Lungs: clear bilaterally, no crackles or wheezes  Abdomen: soft, gravid, non-tender  Extremities: bilateral lower extremities non-tender with no edema    FHT: + FHT x 2  Mallow: Quiet    Prenatal Labs:      Lab Results   Component Value Date    ABO O 2019    RH Pos 2019    AS Neg 2019    HEPBANG Nonreactive 2019    CHPCRT Negative 2019    GCPCRT Negative 2019    HGB 12.6 2020     GBS Status:   No results found for: GBS    Labs:     Lab Results   Component Value Date    WBC 13.3 2020     Lab Results   Component Value Date    RBC 3.53 2020     Lab Results   Component Value Date    HGB 11.0 2020     Lab Results   Component Value Date    HCT 31.4 2020     No components found for: MCT  Lab Results   Component Value Date    MCV 89 2020     Lab Results   Component Value Date    MCH 31.2 2020     Lab Results   Component Value Date    MCHC 35.0 2020     Lab Results   Component Value Date    RDW 12.9 2020     Lab Results   Component Value Date     2020       Results for orders placed or performed during the hospital encounter of 20 (from the past 24 hour(s))   M US OB Transvaginal    Narrative            Cx TV  ---------------------------------------------------------------------------------------------------------  Pat. Name: OSEI LLOYD       Study Date:  2020 3:03pm  Pat. NO:  [id number]        Referring  MD: JUAN ALSTON  Site:  [facility]       Sonographer: Samantha White RDMS  :  1980        Age:   39  ---------------------------------------------------------------------------------------------------------    INDICATION  ---------------------------------------------------------------------------------------------------------  Advanced Maternal Age--Primigravida. IVF. Dichorionic-diamniotic twin pregnancy. Low risk NIPT and normal AFP. New finding of short  cervix.      METHOD  ---------------------------------------------------------------------------------------------------------  Transabdominal and transvaginal ultrasound examination. View: Sufficient      PREGNANCY  ---------------------------------------------------------------------------------------------------------  Twin pregnancy. Dichorionic-diamniotic. Number of fetuses: 2      DATING  ---------------------------------------------------------------------------------------------------------                                           Date                                Details                                                                                      Gest. age                      EVA  Conception                                                               Conception: IVF  Embryo transfer                  11/3/2019                        IVF / ET: 3 d                                                                               20 w + 0 d                     7/23/2020  Prior assessment               12/20/2019                       GA: 9 w + 1 d                                                                            20 w + 0 d                     7/23/2020  Assigned dating                  Dating performed on 03/2/2020, based on the IVF / ET date                                                  20 w + 0 d                     7/23/2020      Fetus 1: GENERAL EVALUATION  ---------------------------------------------------------------------------------------------------------  Cardiac activity present.  bpm.  Fetal movements visualized.  Presentation cephalic, presenting, maternal left .  Placenta anterior, thick dividing membrane.  Umbilical cord previously studied.  Amniotic fluid Amount of AF: normal. MVP 6.4 cm.      Fetus 2: GENERAL EVALUATION  ---------------------------------------------------------------------------------------------------------  Cardiac activity present.   bpm.  Fetal movements visualized.  Presentation cephalic, maternal right .  Placenta posterior, thick dividing membrane.  Umbilical cord previously studied.  Amniotic fluid Amount of AF: normal. MVP 5.9 cm.      MATERNAL STRUCTURES  ---------------------------------------------------------------------------------------------------------  Cervix                                  Normal                                             Appearance: Appears closed.                                             Approach - Transvaginal: Cervical length 4.7 mm                                             Funneling present      RECOMMENDATION  ---------------------------------------------------------------------------------------------------------  Thank-you for referring your patient for a cervical length assessment.    We discussed the findings on today's ultrasound with the patient.    On speculum exam, the patient was noted to be visually dilated at the external os. The patient was counseled on the possibility of examination indicated cerclage  placement if she meets criteria. She would like to undergo evaluation for this. Dr. Trujillo, [facility] on service, was notified. The patient is currently NPO. She was  counseled that it is possible cerclage may not be an option, but that evaluation will take place at [facility]. We briefly discussed risks and benefits of cerclage should she  be able to proceed including lack of efficacy of cerclage to prevent  or previable birth. She voiced understanding of this plan.    If you have questions regarding today's evaluation or if we can be of further service, please contact the [facility].    **Fetal anomalies may be present but not detected**        Impression    IMPRESSION  ---------------------------------------------------------------------------------------------------------  1) DIchorionic diamniotic twin intrauterine pregnancy at 20 & 1/7 weeks gestational age.  2) The  amniotic fluid measurement is within normal limits for both twins.  3) On transvaginal imaging the cervix is funneled to the external os.         Assessment: Cristin Huerta is a 39 year old  at 20w1d by IVF dating who was sent here for evaluation for cerclage placement due to pre-viable cervical dilation.  I reviewed with the couple that given that there has been progression from cervical shortening to now cervical dilation in 4 days is concerning for possible cervical insufficiency. ?We did also review the other differential diagnoses, including possibility of previable labor/miscarriage, possibility of intrauterine infection, or that the cervical shortening is a risk factor for  delivery, that may not be ameliorated with cerclage placement. ?We had a long discussion regarding the risks, benefits and alternatives of continued expectant management with vaginal progesterone alone vs proceeding with rescue cerclage placement. ?We also discussed that management options and success rates for this intervention are unfortunately adversely affected by the fact that this is a twin pregnancy.  At the conclusion of our discussion, Cristin opted to proceed with rescue cerclage placement today.    Plan:    - Admit to labor and delivery as outpatient  - Continue NPO (has been NPO since last night) and start IVF  ml/hour  - Pre-op Ancef 2 gm IV and then 1 gm IV q 8 hours x 3 doses total and Indocin 50 mg PO q 8 hours x 3 doses total given exposed membranes  - Plan to proceed with rescue cerclage placement today  - Continuous toco and dopp tones q shift    Patient seen and care plan discussed under supervision of Dr. Trujillo.    Cecilia Prince MD  Obstetrics and Gyncology, PGY-3  2020 , 6:40 PM      Physician Attestation   I, Cecilia Prince MD, saw this patient with the resident and agree with the resident/fellow's findings and plan of care as documented in the note.      I personally reviewed vital signs,  medications, labs, imaging and toco monitoring.    Key findings: In summary, Cristin Huerta is a  at 20w1d by IVF dating who was sent here for evaluation for cerclage placement due to pre-viable cervical dilation.  I reviewed with the couple that given that there has been progression from cervical shortening to now cervical dilation in 4 days is concerning for possible cervical insufficiency. ?We did also review the other differential diagnoses, including possibility of previable labor/miscarriage, possibility of intrauterine infection, or that the cervical shortening is a risk factor for  delivery, that may not be ameliorated with cerclage placement. ?We had a long discussion regarding the risks, benefits and alternatives of continued expectant management with vaginal progesterone alone vs proceeding with rescue cerclage placement. ?We also discussed that management options and success rates for this intervention are unfortunately adversely affected by the fact that this is a twin pregnancy.  At the conclusion of our discussion, Cristin opted to proceed with rescue cerclage placement today.    Brady Trujillo MD  Date of Service (when I saw the patient): 20    Time Spent on this Encounter   I spent 40 minutes on the unit/floor managing the care of Cristin Huerta. Over 50% of my time was spent on the following:   - Counseling the patient and/or family regarding: diagnostic results, prognosis, risks and benefits of treatment options, recommended follow-up, medical compliance and prevention of disease  - Coordination of care with the: nurse and patient    Cristin Huerta is a 39 year old  at 20w1d by IVF dating who was sent here for evaluation for cerclage placement due to pre-viable cervical dilation.  I reviewed with the couple that given that there has been progression from cervical shortening to now cervical dilation in 4 days is concerning for possible cervical insufficiency. ?We did also review the other  differential diagnoses, including possibility of previable labor/miscarriage, possibility of intrauterine infection, or that the cervical shortening is a risk factor for  delivery, that may not be ameliorated with cerclage placement. ?We had a long discussion regarding the risks, benefits and alternatives of continued expectant management with vaginal progesterone alone vs proceeding with rescue cerclage placement. ?We also discussed that management options and success rates for this intervention are unfortunately adversely affected by the fact that this is a twin pregnancy.  At the conclusion of our discussion, Cristin opted to proceed with rescue cerclage placement today.    Brady Trujillo MD

## 2020-03-06 NOTE — ANESTHESIA PREPROCEDURE EVALUATION
"Anesthesia Pre-Procedure Evaluation    Patient: Cristin Huerta   MRN:     [id number] Gender:   female   Age:    39 year old :      1980        Preoperative Diagnosis: Dichorionic diamniotic twin pregnancy in second trimester [O30.042]  Cervical insufficiency during pregnancy in second trimester, antepartum [O34.32]   Procedure(s):  CERCLAGE, CERVIX, VAGINAL APPROACH     LABS:  CBC:   Lab Results   Component Value Date    WBC 11.3 (H) 2020    WBC 9.5 2019    HGB 12.6 2020    HGB 12.0 2019    HCT 37.0 2020    HCT 35.7 2019     2020     2019     BMP:   Lab Results   Component Value Date     2020    POTASSIUM 3.4 2020    CHLORIDE 107 2020    CO2 18 (L) 2020    BUN 11 2020    CR 0.58 2020     (H) 2020     COAGS: No results found for: PTT, INR, FIBR  POC: No results found for: BGM, HCG, HCGS  OTHER:   Lab Results   Component Value Date    INÉS 8.7 2020    ALBUMIN 3.0 (L) 2020    PROTTOTAL 6.8 2020    ALT 83 (H) 2020    AST 43 2020    ALKPHOS 66 2020    BILITOTAL 0.4 2020        Preop Vitals    BP Readings from Last 3 Encounters:   20 124/83   20 110/70   20 122/74    Pulse Readings from Last 3 Encounters:   20 88   20 84   19 74      Resp Readings from Last 3 Encounters:   20 18   20 18    SpO2 Readings from Last 3 Encounters:   20 100%      Temp Readings from Last 1 Encounters:   20 36.7  C (98.1  F) (Oral)    Ht Readings from Last 1 Encounters:   20 1.727 m (5' 8\")      Wt Readings from Last 1 Encounters:   20 80.7 kg (178 lb)    Estimated body mass index is 27.06 kg/m  as calculated from the following:    Height as of this encounter: 1.727 m (5' 8\").    Weight as of this encounter: 80.7 kg (178 lb).     LDA:  Peripheral IV 20 Distal;Right Lower forearm (Active)   Number of days: 0    "     Past Medical History:   Diagnosis Date     Depression       Past Surgical History:   Procedure Laterality Date     EYE SURGERY      jaw surgery     GYN SURGERY      colposcopy, LEEP, R tubal removal, D and C     ORTHOPEDIC SURGERY      wrist      No Known Allergies          JMARYANG FV AN PHYSICAL EXAM    Assessment:   ASA SCORE: 2 emergent   H&P: History and physical reviewed and following examination; no interval change.    NPO Status: NPO Appropriate     Plan:   Anes. Type:  MAC; Spinal   Pre-Medication: None   Induction:  N/a   Airway: Native Airway   Access/Monitoring: PIV   Maintenance: N/a     Postop Plan:   Postop Pain: Regional  Postop Sedation/Airway: Not planned  Disposition: Inpatient/Admit     PONV Management: Adult Risk Factors: Female   Prevention: Ondansetron     CONSENT: Direct conversation   Plan and risks discussed with: Patient          Comments for Plan/Consent:  Mara Barrera MD     ANESTHESIA PREOP EVALUATION    PROCEDURE: Procedure(s):  CERCLAGE, CERVIX, VAGINAL APPROACH    HPI: Cristin Huerta is a 39 year old female who presents for above procedure 2/2 insufficient cervix, setting of twin pregnancy.     PAST MEDICAL HISTORY:    Past Medical History:   Diagnosis Date     Depression        PAST SURGICAL HISTORY:    Past Surgical History:   Procedure Laterality Date     EYE SURGERY      jaw surgery     GYN SURGERY      colposcopy, LEEP, R tubal removal, D and C     ORTHOPEDIC SURGERY      wrist       SOCIAL HISTORY:       Social History     Tobacco Use     Smoking status: Former Smoker     Smokeless tobacco: Never Used     Tobacco comment: Quit 2005   Substance Use Topics     Alcohol use: Not Currently       ALLERGIES:     No Known Allergies    MEDICATIONS:     Medications Prior to Admission   Medication Sig Dispense Refill Last Dose     aspirin (ASA) 81 MG chewable tablet Take 81 mg by mouth daily   3/5/2020 at Unknown time     Prenat w/o V-FT-Gtejrhj-FA-DHA (PNV-DHA PO)     3/4/2020 at Unknown time     progesterone (PROMETRIUM) 200 MG capsule Place 1 capsule (200 mg) vaginally daily 30 capsule 5 3/4/2020 at Unknown time     VITAMIN D PO Take 5,000 Int'l Units by mouth   3/5/2020 at Unknown time       No current outpatient medications on file.       Current Facility-Administered Medications Ordered in Epic   Medication Dose Route Frequency Provider Last Rate Last Dose     acetaminophen (TYLENOL) tablet 975 mg  975 mg Oral Once Brady Trujillo MD         ceFAZolin (ANCEF) intermittent infusion 2 g in 100 mL dextrose PRE-MIX  2 g Intravenous Pre-Op/Pre-procedure x 1 dose Brady Trujillo MD         lactated ringers infusion   Intravenous Continuous Brady Trujillo  mL/hr at 03/05/20 1905       lidocaine (LMX4) cream   Topical Q1H PRN Brady Trujillo MD         lidocaine 1 % 1 mL  1 mL Other Q1H PRN Brady Trujillo MD         sodium chloride (PF) 0.9% PF flush 3 mL  3 mL Intravenous Q1H PRN Brady Trujillo MD         sodium chloride (PF) 0.9% PF flush 3 mL  3 mL Intravenous Q8H Brady Trujillo MD         sodium citrate-citric acid (BICITRA) solution 30 mL  30 mL Oral Once Brady Trujillo MD         No current [facility]-ordered outpatient medications on file.       PHYSICAL EXAM:    Vitals: T 98.1, P 88, /83, R 18, SpO2  , Weight   Wt Readings from Last 2 Encounters:   03/05/20 80.7 kg (178 lb)   02/07/20 82.6 kg (182 lb)       See doc flowsheet    NPO STATUS: see doc flowsheet    LABS:    BMP:  Recent Labs   Lab Test 01/12/20  0835      POTASSIUM 3.4   CHLORIDE 107   CO2 18*   BUN 11   CR 0.58   *   INÉS 8.7       LFTs:   Recent Labs   Lab Test 01/12/20  0835   PROTTOTAL 6.8   ALBUMIN 3.0*   BILITOTAL 0.4   ALKPHOS 66   AST 43   ALT 83*       CBC:   Recent Labs   Lab Test 01/12/20  0835   WBC 11.3*   RBC 4.24   HGB 12.6   HCT 37.0   MCV 87   MCH 29.7   MCHC 34.1   RDW 12.4          Coags:  No results for input(s): INR, PTT, FIBR in the last 82150  hours.    Imaging:  No orders to display       John Barrera MD  Anesthesiology Staff  Pager [phone]    3/5/2020  7:13 PM

## 2020-03-06 NOTE — ANESTHESIA CARE TRANSFER NOTE
Patient: Cristin Huerta    Procedure(s):  CERCLAGE, CERVIX, VAGINAL APPROACH    Diagnosis: Dichorionic diamniotic twin pregnancy in second trimester [O30.042]  Cervical insufficiency during pregnancy in second trimester, antepartum [O34.32]  Diagnosis Additional Information: No value filed.    Anesthesia Type:   MAC, Spinal     Note:  Airway :Room Air  Patient transferred to:PACU  Comments: VSS. Breathing spontaneously at a regular rate with adequate tidal volumes and maintaining O2 sats on RA. Denies nausea or pain. No apparent complications from spinal anesthesia.     Jaron Maguire MD, MSc.  Anesthesia Resident, CA-2  Handoff Report: Identifed the Patient, Identified the Reponsible Provider, Reviewed the pertinent medical history, Discussed the surgical course, Reviewed Intra-OP anesthesia mangement and issues during anesthesia, Set expectations for post-procedure period and Allowed opportunity for questions and acknowledgement of understanding      Vitals: (Last set prior to Anesthesia Care Transfer)    CRNA VITALS  3/5/2020 2006 - 3/5/2020 2044      3/5/2020             Pulse:  81    SpO2:  97 %                Electronically Signed By: Jaron Maguire MD  March 5, 2020  8:44 PM

## 2020-03-06 NOTE — PLAN OF CARE
VSS. Afebrile. FHT- appropriate for gestational age (see flow sheets). Non contractions. Denies pain. Able to ambulate and void independently.  Has some spotting. Slept well overnight.

## 2020-03-06 NOTE — OP NOTE
Operative Note: Cervical Cerclage    Cristin Huerta   MRN: [id number]  : 1980  Date of surgery: 20             Pre-Op Diagnosis:   1) Diamniotic-dichorionic twin gestation at 20w1d   2) Cervical insufficiency          Post-Op Diagnosis:   1) Same          Procedure:   1) Double Francisco cervical cerclage, two knots at the 12 o'clock position          Surgeons:   Attending: Brady Trujillo MD  Resident: Cecilia Prince MD           Anesthesia:   Spinal          Estimated Blood Loss:   5 cc          Findings:   On speculum exam cervix visually 1.5 cm dilated with membranes flush with external os. Following procedure cervical length approximately 2.5-3 cm long. Cervix closed on digital exam at end of case. Surgical sites hemostatic.   Doptones present x2 at beginning and end of procedure          Specimens:   None          Complications:   None          History:   Cristin Huerta is 39 year old she was counseled regarding these findings, the patient has decided opted to proceed with ultrasound indicated cerclage.          Details of Procedure:     After administration of spinal anesthesia the patient was placed in the dorsal lithotomy position and prepped and draped in the usual fashion. Time out performed. The bladder was then straight catheterized.  A weighted speculum was placed into the vagina and right angle retractors were used to visualize the cervix. The vagina and cervix were copiously cleansed with betadine solution. Next, a circumferential stay suture was placed at the external cervix with a #2 Ethilon. Next, a circumferential suture of #2 Ethilon was placed in the usual fashion at the cervicovaginal reflection with knot tied at 12 o'clock position.   A second suture of #2 Ethilon was placed in standard fashion, approximately 1cm distal to the first stitch and tied at the 12 o'clock position.  Both sutures were securely tied down and digital exam confirmed that the cervix was closed.  The stay suture was  then removed, the cervix was examined and found to be tightly closed.     The bladder was drained of clear urine and a rectal exam confirmed that no sutures were present in the rectum.  The cervix and vaginal vault were inspected and noted to be free of injury and hemostatic. The instruments were removed from the vagina, the patient recalled from spinal anesthesia and transferred to the recovery room in satisfactory condition. Sponge and needle counts were correct at the close of the case x 2.     Dr. Trujillo present for entire procedure    Cecilia Prince MD   OB/GYN Resident PGY-3  3/5/2020 9:49 PM    Physician Attestation   I was present for the entire procedure of cervical cerclage.    Brady Trujillo  Date of Service (when I saw the patient): 3/5/20

## 2020-03-06 NOTE — DISCHARGE SUMMARY
[facility] Discharge Summary    Cristin Huerta MRN# [id number]   Age: 39 year old YOB: 1980     Date of Admission:  3/5/2020  Date of Discharge::  March 6, 2020  Admitting Physician:  Brady Trujillo MD  Discharge Physician:  Brady Trujillo MD          Admission Diagnoses:   1) Diamniotic-dichorionic twin gestation at 20w1d   2) Cervical insufficiency  3) IVF pregnancy           Discharge Diagnosis:   - Same as above           Procedures:   Procedure(s): Double Francisco cervical cerclage, two knots at the 12 o'clock position            Medications Prior to Admission:     Medications Prior to Admission   Medication Sig Dispense Refill Last Dose     aspirin (ASA) 81 MG chewable tablet Take 81 mg by mouth daily   3/5/2020 at Unknown time     Prenat w/o C-SC-Lbliigc-FA-DHA (PNV-DHA PO)    3/4/2020 at Unknown time     progesterone (PROMETRIUM) 200 MG capsule Place 1 capsule (200 mg) vaginally daily 30 capsule 5 3/4/2020 at Unknown time     VITAMIN D PO Take 5,000 Int'l Units by mouth   3/5/2020 at Unknown time             Discharge Medications:        Review of your medicines      UNREVIEWED medicines. Ask your doctor about these medicines      Dose / Directions   aspirin 81 MG chewable tablet  Commonly known as:  ASA      Dose:  81 mg  Take 81 mg by mouth daily  Refills:  0     PNV-DHA PO      Refills:  0     progesterone 200 MG capsule  Commonly known as:  PROMETRIUM  Used for:  Short cervix affecting pregnancy      Dose:  200 mg  Place 1 capsule (200 mg) vaginally daily  Quantity:  30 capsule  Refills:  5     VITAMIN D PO      Dose:  5,000 Int'l Units  Take 5,000 Int'l Units by mouth  Refills:  0        START taking      Dose / Directions   progesterone 200 MG VA SUPP  Used for:  Cervical insufficiency during pregnancy in second trimester, antepartum      Dose:  200 mg  Place 1 suppository (200 mg) vaginally At Bedtime  Quantity:  30 suppository  Refills:  3           Where to get your medicines  "     These medications will be mailed to you     From:  [facility] - [place], [place] - [address] [place] [address] [place] Phone:  [phone]     progesterone 200 MG VA SUPP               Consultations:   None          Brief Admission History     Cristin Huerta is a 39 year old  at 20w1d by IVF dating who was sent from [facility] clinic in [place].  She states she was seen by [facility] this past Monday for her anatomy US and the cervix was incidentally noted to be short (16-20 mm).  She was asymptomatic and therefore was started on vaginal progesterone at that time and was scheduled for re-assessment today.  On today's exam, her cervix measured ~4 mm, but on SSE, she was noted to be ~FT dilated with visible membranes of twin A at the external os.  She denies contractions, loss of fluid, vaginal bleeding, abnormal vaginal discharge.  She endoreses intermittent \"twinges\" in the groin, which have been present for a few months.       She states that she is feeling well today.  She denies headache, vision changes, chest pain, shortness of breath, fever, chills, nausea, vomiting or other systemic complaints. She denies vaginal bleeding or loss of fluid and is feeling normal fetal movement. She does have a history of LEEP procedure in 2010.           Hospital Course:   Cristin Huerta is a 39 year old  at 20w1d by IVF dating who was sent here for evaluation for cerclage placement due to pre-viable cervical dilation.  I reviewed with the couple that given that there has been progression from cervical shortening to now cervical dilation in 4 days is concerning for possible cervical insufficiency. We did also review the other differential diagnoses, including possibility of previable labor/miscarriage, possibility of intrauterine infection, or that the cervical shortening is a risk factor for  delivery, that may not be ameliorated with cerclage placement. We had a long discussion regarding the risks, benefits and alternatives of " continued expectant management with vaginal progesterone alone vs proceeding with rescue cerclage placement. We also discussed that management options and success rates for this intervention are unfortunately adversely affected by the fact that this is a twin pregnancy.  At the conclusion of our discussion, Cristin opted to proceed with rescue cerclage placement.          Operative Course:   Patient underwent double Francisco cervical cerclage, two knots at the 12 o'clock position. Procedure uncomplicated. Findings below.     Findings:   On speculum exam cervix visually 1.5 cm dilated with membranes flush with external os. Following procedure cervical length approximately 2.5-3 cm long. Cervix closed on digital exam at end of case. Surgical sites hemostatic. Doptones present x2 at beginning and end of procedure          Hospital Course:   Following procedure patient was continued on ancef Q8H, indomethacin 50mg Q8H, and pepcid. On POD#1           Discharge Instructions and Follow-Up:   Discharge diet: Regular   Discharge activity: No heavy lifting, complete pelvic rest - nothing in the vagina. No intercourse.    Discharge follow-up: Please keep scheduled follow-up appointments.            Discharge Disposition:   Discharged to home      Alicia Myhre, DO  Obstetrics and Gyncology, PGY-3  March 6, 2020 , 7:29 PM     Physician Attestation   I, Brady Trujillo, saw and evaluated this patient prior to discharge.  I discussed the patient with the resident/fellow and agree with plan of care as documented in the note.      I personally reviewed vital signs, medications, labs, imaging and toco monitoring.    I personally spent 30 minutes on discharge activities.    Brady Trujillo MD  Date of Service (when I saw the patient): 03/06/20

## 2020-03-10 ENCOUNTER — TELEPHONE (OUTPATIENT)
Dept: MATERNAL FETAL MEDICINE | Facility: CLINIC | Age: 40
End: 2020-03-10

## 2020-03-10 NOTE — TELEPHONE ENCOUNTER
"Pt calls with concerns about laxative use-- has been taking miralax with minimal improvement so added colace yesterday on Dr. Trujillo's recommendation. Unknowingly purchased colace combined with senna, has taken 3 doses since yesterday morning. Developed abdominal cramping after dose yesterday, relieved with BM. Recurred this morning at which time pt noted senna additive in medication. Pt seeking direction, not cramping currently. Denies LOF, VB, increased vaginal discharge. Pt instructed to discontinue use of senna/colace and seek plain colace, monitor symptoms. Seek care with increased cramping/backache, any bleeding, LOF, change in mucous/discharge. Pt notes she initiated progesterone suppositories prior to cerclage placement and has recently noticed a \"thin, yellow discharge,\" states it does not have a foul odor and is not continuous leaking of fluid, no itching/burning. Pt directed to primary OB for evaluation of discharge if any of these symptoms develop. Additionally pt will be seen in MFM clinic tomorrow for ultrasound evaluation.   "

## 2020-03-11 ENCOUNTER — HOSPITAL ENCOUNTER (OUTPATIENT)
Dept: ULTRASOUND IMAGING | Facility: CLINIC | Age: 40
End: 2020-03-11
Attending: OBSTETRICS & GYNECOLOGY
Payer: COMMERCIAL

## 2020-03-11 ENCOUNTER — HOSPITAL ENCOUNTER (OUTPATIENT)
Dept: CARDIOLOGY | Facility: CLINIC | Age: 40
End: 2020-03-11
Attending: OBSTETRICS & GYNECOLOGY
Payer: COMMERCIAL

## 2020-03-11 ENCOUNTER — OFFICE VISIT (OUTPATIENT)
Dept: MATERNAL FETAL MEDICINE | Facility: CLINIC | Age: 40
End: 2020-03-11
Attending: OBSTETRICS & GYNECOLOGY
Payer: COMMERCIAL

## 2020-03-11 DIAGNOSIS — O26.879 SHORT CERVIX AFFECTING PREGNANCY: Primary | ICD-10-CM

## 2020-03-11 DIAGNOSIS — O26.90 PREGNANCY RELATED CONDITION, ANTEPARTUM: ICD-10-CM

## 2020-03-11 DIAGNOSIS — O26.879 SHORT CERVIX AFFECTING PREGNANCY: ICD-10-CM

## 2020-03-11 DIAGNOSIS — O30.042 DICHORIONIC DIAMNIOTIC TWIN PREGNANCY IN SECOND TRIMESTER: ICD-10-CM

## 2020-03-11 PROCEDURE — 93325 DOPPLER ECHO COLOR FLOW MAPG: CPT

## 2020-03-11 PROCEDURE — 76825 ECHO EXAM OF FETAL HEART: CPT

## 2020-03-11 PROCEDURE — 76817 TRANSVAGINAL US OBSTETRIC: CPT

## 2020-03-11 NOTE — PROGRESS NOTES
[facility]   Heart Center Fetal Consult Note    Patient:  Cristin Huerta MRN:  [id number]   YOB: 1980 Age:  39 year old   Date of Visit:  3/11/2020 PCP:  [facility] [place]     Dear Dr. Annalise Jang:    I had the pleasure of seeing Cristin Huerta at the [facility] on 3/11/2020 in fetal cardiology consultation for fetal echocardiogram results. She presented today accompanied by her . As you know, she is a 39 year old  at 21w0d who presented for fetal echocardiogram today because of dichorionic and diamniotic twins and IVF.    I performed and interpreted the fetal echocardiogram today, which demonstrated:     Fetus 1: Fetus 1. Likely normal fetal echocardiogram. Normal fetal intracardiac connections. Normal right and left ventricular size and function. Fetal heart rate is regular at 153 bpm. No hydrops.    Fetus 2. Likely normal fetal echocardiogram. Normal fetal intracardiac connections. Normal right and left ventricular size and function. Fetal heart rate is regular at 151 bpm. No hydrops.    I reviewed the echo findings today with Cristin Huerta and her partner. Although this was a technically difficult study, the patient is aware that no obvious cardiac abnormalities were identified. She is aware of the general limitations of fetal echocardiography. No additional fetal echocardiograms are recommended. No  cardiac follow-up is required.     Thank you for allowing me to participate in Cristin's care. Please do not hesitate to contact me with questions or concerns.    This visit was separate from the performance and interpretation of the ultrasound. The majority of the time (>50%) was spent in counseling and coordination of care. I spent approximately 15 minutes in face-to-face time reviewing the above considerations.    Ruth Rodríguez M.D.  Pediatric Cardiology  [address]  [place] [facility], 5th floor, [facility] [address]  Phone [phone]  Fax [phone]

## 2020-03-11 NOTE — PROGRESS NOTES
"Please see \"Imaging\" tab under \"Chart Review\" for details of today's US at the [facility].    Benedict Phillip MD  Maternal-Fetal Medicine      "

## 2020-03-17 ENCOUNTER — PRENATAL OFFICE VISIT (OUTPATIENT)
Dept: OBGYN | Facility: CLINIC | Age: 40
End: 2020-03-17
Payer: COMMERCIAL

## 2020-03-17 VITALS — DIASTOLIC BLOOD PRESSURE: 74 MMHG | BODY MASS INDEX: 28.13 KG/M2 | SYSTOLIC BLOOD PRESSURE: 122 MMHG | WEIGHT: 185 LBS

## 2020-03-17 DIAGNOSIS — O09.92 SUPERVISION OF HIGH RISK PREGNANCY IN SECOND TRIMESTER: ICD-10-CM

## 2020-03-17 DIAGNOSIS — O99.012 ANEMIA AFFECTING PREGNANCY IN SECOND TRIMESTER: Primary | ICD-10-CM

## 2020-03-17 DIAGNOSIS — O30.042 DICHORIONIC DIAMNIOTIC TWIN PREGNANCY IN SECOND TRIMESTER: ICD-10-CM

## 2020-03-17 LAB
ERYTHROCYTE [DISTWIDTH] IN BLOOD BY AUTOMATED COUNT: 12.7 % (ref 10–15)
HCT VFR BLD AUTO: 30.2 % (ref 35–47)
HGB BLD-MCNC: 10.4 G/DL (ref 11.7–15.7)
MCH RBC QN AUTO: 31.1 PG (ref 26.5–33)
MCHC RBC AUTO-ENTMCNC: 34.4 G/DL (ref 31.5–36.5)
MCV RBC AUTO: 90 FL (ref 78–100)
PLATELET # BLD AUTO: 234 10E9/L (ref 150–450)
RBC # BLD AUTO: 3.34 10E12/L (ref 3.8–5.2)
WBC # BLD AUTO: 11.9 10E9/L (ref 4–11)

## 2020-03-17 PROCEDURE — 85027 COMPLETE CBC AUTOMATED: CPT | Performed by: OBSTETRICS & GYNECOLOGY

## 2020-03-17 PROCEDURE — 99207 ZZC PRENATAL VISIT: CPT | Performed by: OBSTETRICS & GYNECOLOGY

## 2020-03-17 PROCEDURE — 36415 COLL VENOUS BLD VENIPUNCTURE: CPT | Performed by: OBSTETRICS & GYNECOLOGY

## 2020-03-17 NOTE — PROGRESS NOTES
Had double avilez cerclage at 12:00, 2 knots  Done by MFM  Has mfm appt for growth in a couple weeks  CBC today  Growth at 28 weeks, has mfm appt and scan at around 24 weeks

## 2020-03-24 ENCOUNTER — DOCUMENTATION ONLY (OUTPATIENT)
Dept: MATERNAL FETAL MEDICINE | Facility: CLINIC | Age: 40
End: 2020-03-24

## 2020-03-24 NOTE — PROGRESS NOTES
Patient scheduled for RL2 on 3/30/20. US to be rescheduled for 1-2 weeks from Monday based on new COVID protocol. Schedulers will call patient. Routed to primary OB.    Rox Orta RN

## 2020-04-01 ENCOUNTER — MYC MEDICAL ADVICE (OUTPATIENT)
Dept: OBGYN | Facility: CLINIC | Age: 40
End: 2020-04-01

## 2020-04-01 NOTE — TELEPHONE ENCOUNTER
Does she have an ultrasound coming up with mfm?  How much weight has she gained.  I don't see any attachment?

## 2020-04-01 NOTE — TELEPHONE ENCOUNTER
Prepregnancy wt 169lb  21w6d OV 185lb  4/6/2020 MFM apt with US    Marilyn Cobb RN on 4/1/2020 at 11:46 AM

## 2020-04-06 ENCOUNTER — HOSPITAL ENCOUNTER (OUTPATIENT)
Dept: ULTRASOUND IMAGING | Facility: CLINIC | Age: 40
End: 2020-04-06
Attending: OBSTETRICS & GYNECOLOGY
Payer: COMMERCIAL

## 2020-04-06 ENCOUNTER — OFFICE VISIT (OUTPATIENT)
Dept: MATERNAL FETAL MEDICINE | Facility: CLINIC | Age: 40
End: 2020-04-06
Attending: OBSTETRICS & GYNECOLOGY
Payer: COMMERCIAL

## 2020-04-06 DIAGNOSIS — O26.879 SHORT CERVIX AFFECTING PREGNANCY: ICD-10-CM

## 2020-04-06 DIAGNOSIS — O30.042 DICHORIONIC DIAMNIOTIC TWIN PREGNANCY IN SECOND TRIMESTER: Primary | ICD-10-CM

## 2020-04-06 PROCEDURE — 76816 OB US FOLLOW-UP PER FETUS: CPT | Mod: 59

## 2020-04-14 ENCOUNTER — MYC MEDICAL ADVICE (OUTPATIENT)
Dept: OBGYN | Facility: CLINIC | Age: 40
End: 2020-04-14

## 2020-04-15 ENCOUNTER — MYC MEDICAL ADVICE (OUTPATIENT)
Dept: OBGYN | Facility: CLINIC | Age: 40
End: 2020-04-15

## 2020-04-15 NOTE — TELEPHONE ENCOUNTER
Routing pt [employer] message to provider to advise.    Marilyn Cobb RN on 4/15/2020 at 11:10 AM

## 2020-04-17 NOTE — TELEPHONE ENCOUNTER
I would not do the flax.  We tend to stay away from in pregnancy.  That is only in large amount though.   So I tend to say just avoid, but I don't have a quantity that is safe or unsafe, not studied well.

## 2020-04-21 DIAGNOSIS — Z23 NEED FOR TDAP VACCINATION: ICD-10-CM

## 2020-04-21 DIAGNOSIS — Z36.9 ENCOUNTER FOR ANTENATAL SCREENING OF MOTHER: Primary | ICD-10-CM

## 2020-04-27 ENCOUNTER — TELEPHONE (OUTPATIENT)
Dept: OBGYN | Facility: CLINIC | Age: 40
End: 2020-04-27

## 2020-04-27 ENCOUNTER — HOSPITAL ENCOUNTER (OUTPATIENT)
Facility: CLINIC | Age: 40
Discharge: HOME | End: 2020-04-27
Attending: OBSTETRICS & GYNECOLOGY | Admitting: OBSTETRICS & GYNECOLOGY
Payer: COMMERCIAL

## 2020-04-27 VITALS — SYSTOLIC BLOOD PRESSURE: 133 MMHG | DIASTOLIC BLOOD PRESSURE: 86 MMHG

## 2020-04-27 PROCEDURE — G0463 HOSPITAL OUTPT CLINIC VISIT: HCPCS

## 2020-04-27 NOTE — PLAN OF CARE
Discharged home with verbal and follow up  Instructions.  States she understands ibstructions.    Will see MFM and OB tomorrow

## 2020-04-27 NOTE — DISCHARGE INSTRUCTIONS
Discharge Instruction for Undelivered Patients      You were seen for: Fetal Assessment  We Consulted: Suhail CHAVARRIA  You had (Test or Medicine):Fetal monitoring, Vitals     Diet:   Drink 8 to 12 glasses of liquids (milk, juice, water) every day.  You may eat meals and snacks.     Activity:  Call your doctor or nurse midwife if your baby is moving less than usual.     Call your provider if you notice:  Swelling in your face or increased swelling in your hands or legs.  Headaches that are not relieved by Tylenol (acetaminophen).  Changes in your vision (blurring: seeing spots or stars.)  Nausea (sick to your stomach) and vomiting (throwing up).   Weight gain of 5 pounds or more per week.  Heartburn that doesn't go away.  Signs of bladder infection: pain when you urinate (use the toilet), need to go more often and more urgently.  The bag of luevano (rupture of membranes) breaks, or you notice leaking in your underwear.  Bright red blood in your underwear.  Abdominal (lower belly) or stomach pain.  For first baby: Contractions (tightening) less than 5 minutes apart for one hour or more.  Second (plus) baby: Contractions (tightening) less than 10 minutes apart and getting stronger.  *If less than 34 weeks: Contractions (tightenings) more than 6 times in one hour.  Increase or change in vaginal discharge (note the color and amount)  Other:     Follow-up:  As scheduled in the clinic

## 2020-04-27 NOTE — PLAN OF CARE
Arrived to birthplace with twins.  Feeling like she is having a decrease in movement.    Placed on EFM with her permission.  MD updated.    Once she feels better about FHT and fetal well being may go home.    She is already scheduled for M andOB visit tomorrow.

## 2020-04-27 NOTE — TELEPHONE ENCOUNTER
Over the weekend has had decreased fetal movement definitely different than she has had in the past, one side feels more sluggish than normal. Is feeling movement but its different. Discussed its quantity of movements not necessarily quality.     Also felt a pins and needles feeling in her feet last night along with a hot flash, has resolved.    Denies vaginal bleeding, not gomez, has some increased discharge, does not feel like her water has broke.   Will consult with Dr. Jang and call pt back    Lia Ga RN on 4/27/2020 at 9:13 AM    Consulted with Dr. Jang, would like pt to be evaluated in the MAC    Called pt, LMTCB  Lia Ga RN on 4/27/2020 at 9:24 AM    Pt called back, will go to MAC for eval, MAC notified  Lia Ga RN on 4/27/2020 at 9:29 AM

## 2020-04-28 ENCOUNTER — PRENATAL OFFICE VISIT (OUTPATIENT)
Dept: OBGYN | Facility: CLINIC | Age: 40
End: 2020-04-28
Attending: OBSTETRICS & GYNECOLOGY
Payer: COMMERCIAL

## 2020-04-28 ENCOUNTER — PREP FOR PROCEDURE (OUTPATIENT)
Dept: OBGYN | Facility: CLINIC | Age: 40
End: 2020-04-28

## 2020-04-28 ENCOUNTER — TELEPHONE (OUTPATIENT)
Dept: OBGYN | Facility: CLINIC | Age: 40
End: 2020-04-28

## 2020-04-28 ENCOUNTER — ANCILLARY PROCEDURE (OUTPATIENT)
Dept: ULTRASOUND IMAGING | Facility: CLINIC | Age: 40
End: 2020-04-28
Attending: OBSTETRICS & GYNECOLOGY
Payer: COMMERCIAL

## 2020-04-28 ENCOUNTER — TRANSCRIBE ORDERS (OUTPATIENT)
Dept: MATERNAL FETAL MEDICINE | Facility: CLINIC | Age: 40
End: 2020-04-28

## 2020-04-28 VITALS — WEIGHT: 192 LBS | SYSTOLIC BLOOD PRESSURE: 128 MMHG | DIASTOLIC BLOOD PRESSURE: 70 MMHG | BODY MASS INDEX: 29.19 KG/M2

## 2020-04-28 DIAGNOSIS — O26.90 PREGNANCY RELATED CONDITION: Primary | ICD-10-CM

## 2020-04-28 DIAGNOSIS — O36.5990 PREGNANCY AFFECTED BY FETAL GROWTH RESTRICTION: Primary | ICD-10-CM

## 2020-04-28 DIAGNOSIS — Z36.9 ENCOUNTER FOR ANTENATAL SCREENING OF MOTHER: ICD-10-CM

## 2020-04-28 DIAGNOSIS — O30.042 DICHORIONIC DIAMNIOTIC TWIN PREGNANCY IN SECOND TRIMESTER: ICD-10-CM

## 2020-04-28 DIAGNOSIS — O09.93 SUPERVISION OF HIGH RISK PREGNANCY IN THIRD TRIMESTER: ICD-10-CM

## 2020-04-28 DIAGNOSIS — Z23 NEED FOR TDAP VACCINATION: ICD-10-CM

## 2020-04-28 DIAGNOSIS — O09.92 SUPERVISION OF HIGH RISK PREGNANCY IN SECOND TRIMESTER: ICD-10-CM

## 2020-04-28 DIAGNOSIS — O30.009 TWIN PREGNANCY, ANTEPARTUM: Primary | ICD-10-CM

## 2020-04-28 PROBLEM — O09.90 SUPERVISION OF HIGH-RISK PREGNANCY: Status: ACTIVE | Noted: 2019-12-20

## 2020-04-28 LAB
ERYTHROCYTE [DISTWIDTH] IN BLOOD BY AUTOMATED COUNT: 12.6 % (ref 10–15)
GLUCOSE 1H P 50 G GLC PO SERPL-MCNC: 125 MG/DL (ref 60–129)
HCT VFR BLD AUTO: 30 % (ref 35–47)
HGB BLD-MCNC: 10.1 G/DL (ref 11.7–15.7)
MCH RBC QN AUTO: 30.9 PG (ref 26.5–33)
MCHC RBC AUTO-ENTMCNC: 33.7 G/DL (ref 31.5–36.5)
MCV RBC AUTO: 92 FL (ref 78–100)
PLATELET # BLD AUTO: 188 10E9/L (ref 150–450)
RBC # BLD AUTO: 3.27 10E12/L (ref 3.8–5.2)
WBC # BLD AUTO: 14.4 10E9/L (ref 4–11)

## 2020-04-28 PROCEDURE — 99207 ZZC PRENATAL VISIT: CPT | Performed by: OBSTETRICS & GYNECOLOGY

## 2020-04-28 PROCEDURE — 90471 IMMUNIZATION ADMIN: CPT | Performed by: OBSTETRICS & GYNECOLOGY

## 2020-04-28 PROCEDURE — 76816 OB US FOLLOW-UP PER FETUS: CPT | Performed by: OBSTETRICS & GYNECOLOGY

## 2020-04-28 PROCEDURE — 82950 GLUCOSE TEST: CPT | Performed by: OBSTETRICS & GYNECOLOGY

## 2020-04-28 PROCEDURE — 90715 TDAP VACCINE 7 YRS/> IM: CPT | Performed by: OBSTETRICS & GYNECOLOGY

## 2020-04-28 PROCEDURE — 85027 COMPLETE CBC AUTOMATED: CPT | Performed by: OBSTETRICS & GYNECOLOGY

## 2020-04-28 PROCEDURE — 36415 COLL VENOUS BLD VENIPUNCTURE: CPT | Performed by: OBSTETRICS & GYNECOLOGY

## 2020-04-28 NOTE — TELEPHONE ENCOUNTER
SENT FOR 2nd SIGN  WILL NEED ASSIST    Obdulia Del Rio  Surgery Scheduler    Procedure name(s) - multi select  primary     Is this a multi surgeon case?  Yes    Comment: Parminder or fior    Comments (who/departments/details)  Fior Zurita    Laterality  N/A    Explant?  No    Reason for procedure  primary  section twins    Location of Case:  [facility]    Surgeon Procedure Time (incision to closure) in minutes (per procedure as applicable)  60    Note:  Surgical Case Time Needed (in minutes)    Date of Surgery (Requested):  2020    Patient Class (for admit prior to surgery, specify number of days in comments):  Surgery admit    Anesthesia  Spinal    H&P To Be Completed By:  Surgeon     needed?  No    Post-Op Appointment  2 weeks    Vendor Needed?  No    Spinal Cord Monitoring?  No

## 2020-04-28 NOTE — NURSING NOTE
Prior to immunization administration, verified patients identity using patient s name and date of birth. Please see Immunization Activity for additional information.     Screening Questionnaire for Adult Immunization    Are you sick today?   No   Do you have allergies to medications, food, a vaccine component or latex?   No   Have you ever had a serious reaction after receiving a vaccination?   No   Do you have a long-term health problem with heart, lung, kidney, or metabolic disease (e.g., diabetes), asthma, a blood disorder, no spleen, complement component deficiency, a cochlear implant, or a spinal fluid leak?  Are you on long-term aspirin therapy?   No   Do you have cancer, leukemia, HIV/AIDS, or any other immune system problem?   No   Do you have a parent, brother, or sister with an immune system problem?   No   In the past 3 months, have you taken medications that affect  your immune system, such as prednisone, other steroids, or anticancer drugs; drugs for the treatment of rheumatoid arthritis, Crohn s disease, or psoriasis; or have you had radiation treatments?   No   Have you had a seizure, or a brain or other nervous system problem?   No   During the past year, have you received a transfusion of blood or blood    products, or been given immune (gamma) globulin or antiviral drug?   No   For women: Are you pregnant or is there a chance you could become       pregnant during the next month?   Yes   Have you received any vaccinations in the past 4 weeks?   No     Immunization questionnaire was positive for at least one answer.  Notified Dr. Jang.        Per orders of Dr. Jang, injection of Tdap given by Tobias Hill CMA. Patient instructed to remain in clinic for 15 minutes afterwards, and to report any adverse reaction to me immediately.       Screening performed by Tobias Hill CMA on 4/28/2020 at 9:13 AM.

## 2020-04-28 NOTE — PROGRESS NOTES
Patient with good movement  No vaginal bleeding  Was seen in triage yesterday  Had good fetal heart tones  Discussed delivery planning, will schedule at 38 weeks

## 2020-04-28 NOTE — TELEPHONE ENCOUNTER
Type of surgery: PRIMARY CS  Location of surgery: [facility]  Date and time of surgery: 7/9/2020 9a  Surgeon: ALECIA WILL NEED ASSIST TO BE CHOSEN CLOSER TO CS DATE  Pre-Op Appt Date: TBD  Post-Op Appt Date: TBD   Packet sent out: MAILED 4/28/2020  Pre-cert/Authorization completed:  TBD  Date: 4/28/2020 William patton/Alyson Del Rio  Surgery Scheduler

## 2020-04-28 NOTE — PROGRESS NOTES
Syphilis is a sexually transmitted disease that can cause birth defects in the babies of untreated mothers. Every pregnant patient is tested for syphilis early in each pregnancy as part of the routine lab work. The [place] Department of [facility] has seen an increase in the rate of syphilis in [place]. The [facility] now recommends testing for syphilis 3 times during a pregnancy, the new prenatal visit, 28 weeks and when admitted for delivery. Patient declines lab testing for syphilis.

## 2020-04-29 DIAGNOSIS — Z20.822 ENCOUNTER FOR LABORATORY TESTING FOR COVID-19 VIRUS: Primary | ICD-10-CM

## 2020-05-04 ENCOUNTER — HOSPITAL ENCOUNTER (OUTPATIENT)
Dept: ULTRASOUND IMAGING | Facility: CLINIC | Age: 40
End: 2020-05-04
Attending: OBSTETRICS & GYNECOLOGY
Payer: COMMERCIAL

## 2020-05-04 ENCOUNTER — OFFICE VISIT (OUTPATIENT)
Dept: MATERNAL FETAL MEDICINE | Facility: CLINIC | Age: 40
End: 2020-05-04
Attending: OBSTETRICS & GYNECOLOGY
Payer: COMMERCIAL

## 2020-05-04 DIAGNOSIS — Z78.9 CONCEIVED BY IN VITRO FERTILIZATION: ICD-10-CM

## 2020-05-04 DIAGNOSIS — O26.90 PREGNANCY RELATED CONDITION: ICD-10-CM

## 2020-05-04 DIAGNOSIS — O30.043 DICHORIONIC DIAMNIOTIC TWIN PREGNANCY IN THIRD TRIMESTER: Primary | ICD-10-CM

## 2020-05-04 PROCEDURE — 76816 OB US FOLLOW-UP PER FETUS: CPT | Mod: 59

## 2020-05-04 NOTE — PROGRESS NOTES
Please see full imaging report from [facility] program under imaging tab.    Normal twin growth. Follow up four weeks.     Thompson Evans MD  Maternal Fetal Medicine

## 2020-05-05 ENCOUNTER — MYC MEDICAL ADVICE (OUTPATIENT)
Dept: OBGYN | Facility: CLINIC | Age: 40
End: 2020-05-05

## 2020-05-06 ENCOUNTER — TELEPHONE (OUTPATIENT)
Dept: OBGYN | Facility: CLINIC | Age: 40
End: 2020-05-06

## 2020-05-06 NOTE — TELEPHONE ENCOUNTER
Elidia from a supply company needs a verbal approval from Triage to approve a BP cuff. Please call her back at [phone] today.

## 2020-05-14 ENCOUNTER — PRENATAL OFFICE VISIT (OUTPATIENT)
Dept: OBGYN | Facility: CLINIC | Age: 40
End: 2020-05-14
Payer: COMMERCIAL

## 2020-05-14 VITALS — WEIGHT: 193 LBS | DIASTOLIC BLOOD PRESSURE: 76 MMHG | SYSTOLIC BLOOD PRESSURE: 130 MMHG | BODY MASS INDEX: 29.35 KG/M2

## 2020-05-14 DIAGNOSIS — O99.013 ANEMIA DURING PREGNANCY IN THIRD TRIMESTER: Primary | ICD-10-CM

## 2020-05-14 DIAGNOSIS — O09.93 SUPERVISION OF HIGH RISK PREGNANCY IN THIRD TRIMESTER: ICD-10-CM

## 2020-05-14 DIAGNOSIS — O30.009 TWIN PREGNANCY, ANTEPARTUM, UNSPECIFIED MULTIPLE GESTATION TYPE: ICD-10-CM

## 2020-05-14 LAB
ERYTHROCYTE [DISTWIDTH] IN BLOOD BY AUTOMATED COUNT: 13 % (ref 10–15)
HCT VFR BLD AUTO: 33 % (ref 35–47)
HGB BLD-MCNC: 10.8 G/DL (ref 11.7–15.7)
MCH RBC QN AUTO: 30.6 PG (ref 26.5–33)
MCHC RBC AUTO-ENTMCNC: 32.7 G/DL (ref 31.5–36.5)
MCV RBC AUTO: 94 FL (ref 78–100)
PLATELET # BLD AUTO: 187 10E9/L (ref 150–450)
RBC # BLD AUTO: 3.53 10E12/L (ref 3.8–5.2)
WBC # BLD AUTO: 11.4 10E9/L (ref 4–11)

## 2020-05-14 PROCEDURE — 80053 COMPREHEN METABOLIC PANEL: CPT | Performed by: OBSTETRICS & GYNECOLOGY

## 2020-05-14 PROCEDURE — 36415 COLL VENOUS BLD VENIPUNCTURE: CPT | Performed by: OBSTETRICS & GYNECOLOGY

## 2020-05-14 PROCEDURE — 99207 ZZC PRENATAL VISIT: CPT | Performed by: OBSTETRICS & GYNECOLOGY

## 2020-05-14 PROCEDURE — 85027 COMPLETE CBC AUTOMATED: CPT | Performed by: OBSTETRICS & GYNECOLOGY

## 2020-05-14 NOTE — PROGRESS NOTES
has growth ultrasound coming up with m  Cbc and cmp today, has had anemia  She is more sore now, she wants to do some minimal stretching  She has a blood pressure cuff at home: she has been checking blood pressures at home.  She is going to continue to check at home  Urine sample left  She has section scheduled  bpps weekly at 32 weeks

## 2020-05-15 LAB
ALBUMIN SERPL-MCNC: 2.4 G/DL (ref 3.4–5)
ALP SERPL-CCNC: 116 U/L (ref 40–150)
ALT SERPL W P-5'-P-CCNC: 20 U/L (ref 0–50)
ANION GAP SERPL CALCULATED.3IONS-SCNC: 11 MMOL/L (ref 3–14)
AST SERPL W P-5'-P-CCNC: 18 U/L (ref 0–45)
BILIRUB SERPL-MCNC: 0.2 MG/DL (ref 0.2–1.3)
BUN SERPL-MCNC: 8 MG/DL (ref 7–30)
CALCIUM SERPL-MCNC: 9.2 MG/DL (ref 8.5–10.1)
CHLORIDE SERPL-SCNC: 108 MMOL/L (ref 94–109)
CO2 SERPL-SCNC: 19 MMOL/L (ref 20–32)
CREAT SERPL-MCNC: 0.6 MG/DL (ref 0.52–1.04)
GFR SERPL CREATININE-BSD FRML MDRD: >90 ML/MIN/{1.73_M2}
GLUCOSE SERPL-MCNC: 121 MG/DL (ref 70–99)
POTASSIUM SERPL-SCNC: 3.6 MMOL/L (ref 3.4–5.3)
PROT SERPL-MCNC: 6.2 G/DL (ref 6.8–8.8)
SODIUM SERPL-SCNC: 138 MMOL/L (ref 133–144)

## 2020-05-18 ENCOUNTER — TELEPHONE (OUTPATIENT)
Dept: OBGYN | Facility: CLINIC | Age: 40
End: 2020-05-18

## 2020-05-18 NOTE — TELEPHONE ENCOUNTER
Last night had some unusual pain she wanted to run by RN    Last evening had a sharp pain in the naval area that lasted a little while and went away. No contractions or tightening. Also had some upper rib pain which also went away.    No LOF or VB. Reports active FM  Reviewed normal pregnancy musculoskeletal changes and discomforts.  Reassured and possible interventions were reviewed with pt:  There are certain things you can do to cope with round ligament pain and ease the discomfort you are having: pregnancy support belts, tylenol, hot/ice packs, baths, exercise such as yoga and swimming that help stretch the muscles, prenatal massage, positioning such as side-lying  and hands and knees, make sure your abdomen is well supported with pillows while doing different positions.     Pt verbalized understanding, in agreement with plan, and voiced no further questions.  Marilyn Cobb RN on 5/18/2020 at 9:20 AM

## 2020-06-01 ENCOUNTER — OFFICE VISIT (OUTPATIENT)
Dept: MATERNAL FETAL MEDICINE | Facility: CLINIC | Age: 40
End: 2020-06-01
Attending: OBSTETRICS & GYNECOLOGY
Payer: COMMERCIAL

## 2020-06-01 ENCOUNTER — HOSPITAL ENCOUNTER (OUTPATIENT)
Dept: ULTRASOUND IMAGING | Facility: CLINIC | Age: 40
End: 2020-06-01
Attending: OBSTETRICS & GYNECOLOGY
Payer: COMMERCIAL

## 2020-06-01 DIAGNOSIS — Z78.9 CONCEIVED BY IN VITRO FERTILIZATION: ICD-10-CM

## 2020-06-01 DIAGNOSIS — O30.043 DICHORIONIC DIAMNIOTIC TWIN PREGNANCY IN THIRD TRIMESTER: Primary | ICD-10-CM

## 2020-06-01 DIAGNOSIS — O40.3XX2 POLYHYDRAMNIOS IN THIRD TRIMESTER COMPLICATION, FETUS 2 OF MULTIPLE GESTATION: ICD-10-CM

## 2020-06-01 DIAGNOSIS — O30.043 DICHORIONIC DIAMNIOTIC TWIN PREGNANCY IN THIRD TRIMESTER: ICD-10-CM

## 2020-06-01 PROCEDURE — 76816 OB US FOLLOW-UP PER FETUS: CPT | Mod: 59

## 2020-06-01 PROCEDURE — 76819 FETAL BIOPHYS PROFIL W/O NST: CPT | Performed by: OBSTETRICS & GYNECOLOGY

## 2020-06-01 NOTE — PROGRESS NOTES
Please see full imaging report from [facility] program under imaging tab.    Findings reviewed in person with Cristin at the time of her US today. We reviewed first the reassuring fetal growth, which should be reassessed in 4 weeks again. The PACs for twin 1 have not been seen previously so this was discussed in the context of a normal twin fetal echo on 3/11/20. She drinks one cup of caffeinated tea daily, and will stop, and will also check to see if the lotion she uses once daily has any cocoa butter. We also reviewed the isolated mild polyhydramnios on the other twin - she did have a normal GCT, but this can be repeated if the polyhydramnios is persistent and worsens.     At this time we have arranged for Cristin to return in one week for repeat BPP on both twins and reassess of current concerns. She states that Dr. Jang was planning for her to start weekly BPPs anyway; we will tentatively schedule the first two here but the second week BPP can be changed to be done with Dr. Jang if things normalize on her follow up here in one week and she prefers to do surveillance there.     Thompson Evans MD  Maternal Fetal Medicine

## 2020-06-08 ENCOUNTER — OFFICE VISIT (OUTPATIENT)
Dept: MATERNAL FETAL MEDICINE | Facility: CLINIC | Age: 40
End: 2020-06-08
Attending: OBSTETRICS & GYNECOLOGY
Payer: COMMERCIAL

## 2020-06-08 ENCOUNTER — HOSPITAL ENCOUNTER (OUTPATIENT)
Dept: ULTRASOUND IMAGING | Facility: CLINIC | Age: 40
End: 2020-06-08
Attending: OBSTETRICS & GYNECOLOGY
Payer: COMMERCIAL

## 2020-06-08 DIAGNOSIS — O40.3XX2 POLYHYDRAMNIOS IN THIRD TRIMESTER COMPLICATION, FETUS 2 OF MULTIPLE GESTATION: ICD-10-CM

## 2020-06-08 DIAGNOSIS — O30.043 DICHORIONIC DIAMNIOTIC TWIN PREGNANCY IN THIRD TRIMESTER: ICD-10-CM

## 2020-06-08 DIAGNOSIS — O30.043 DICHORIONIC DIAMNIOTIC TWIN PREGNANCY IN THIRD TRIMESTER: Primary | ICD-10-CM

## 2020-06-08 PROCEDURE — 76819 FETAL BIOPHYS PROFIL W/O NST: CPT

## 2020-06-08 NOTE — PROGRESS NOTES
Please see full imaging report from [facility] program under imaging tab.    BPP 8/8 x  2  No poly today  PACs twin 1  Breech/ceph    Continue with weekly MFM US.    Thompson Evans MD  Maternal Fetal Medicine

## 2020-06-09 ENCOUNTER — MYC MEDICAL ADVICE (OUTPATIENT)
Dept: OBGYN | Facility: CLINIC | Age: 40
End: 2020-06-09

## 2020-06-11 ENCOUNTER — TELEPHONE (OUTPATIENT)
Dept: OBGYN | Facility: CLINIC | Age: 40
End: 2020-06-11

## 2020-06-11 ENCOUNTER — MYC MEDICAL ADVICE (OUTPATIENT)
Dept: OBGYN | Facility: CLINIC | Age: 40
End: 2020-06-11

## 2020-06-11 ENCOUNTER — PRENATAL OFFICE VISIT (OUTPATIENT)
Dept: OBGYN | Facility: CLINIC | Age: 40
End: 2020-06-11
Payer: COMMERCIAL

## 2020-06-11 VITALS — WEIGHT: 199.2 LBS | DIASTOLIC BLOOD PRESSURE: 76 MMHG | BODY MASS INDEX: 30.29 KG/M2 | SYSTOLIC BLOOD PRESSURE: 140 MMHG

## 2020-06-11 DIAGNOSIS — O09.93 SUPERVISION OF HIGH RISK PREGNANCY IN THIRD TRIMESTER: ICD-10-CM

## 2020-06-11 LAB
ERYTHROCYTE [DISTWIDTH] IN BLOOD BY AUTOMATED COUNT: 13.1 % (ref 10–15)
HCT VFR BLD AUTO: 32.7 % (ref 35–47)
HGB BLD-MCNC: 10.9 G/DL (ref 11.7–15.7)
MCH RBC QN AUTO: 30.3 PG (ref 26.5–33)
MCHC RBC AUTO-ENTMCNC: 33.3 G/DL (ref 31.5–36.5)
MCV RBC AUTO: 91 FL (ref 78–100)
PLATELET # BLD AUTO: 178 10E9/L (ref 150–450)
RBC # BLD AUTO: 3.6 10E12/L (ref 3.8–5.2)
WBC # BLD AUTO: 10.6 10E9/L (ref 4–11)

## 2020-06-11 PROCEDURE — 99207 ZZC PRENATAL VISIT: CPT | Performed by: OBSTETRICS & GYNECOLOGY

## 2020-06-11 PROCEDURE — 80053 COMPREHEN METABOLIC PANEL: CPT | Performed by: OBSTETRICS & GYNECOLOGY

## 2020-06-11 PROCEDURE — 85027 COMPLETE CBC AUTOMATED: CPT | Performed by: OBSTETRICS & GYNECOLOGY

## 2020-06-11 PROCEDURE — 36415 COLL VENOUS BLD VENIPUNCTURE: CPT | Performed by: OBSTETRICS & GYNECOLOGY

## 2020-06-11 PROCEDURE — 84156 ASSAY OF PROTEIN URINE: CPT | Performed by: OBSTETRICS & GYNECOLOGY

## 2020-06-11 NOTE — PROGRESS NOTES
Patient with cerclage- consider having mfm take the cerclage out next appt  Labs today due to increased blood pressure  Has fetal echo scheduled- PACs on ultrasound on one of the fetus'   section scheduled  GBS next appt  Needs cerclage removed  Will call to schedule

## 2020-06-11 NOTE — TELEPHONE ENCOUNTER
That's fine, I would just have her keep with three then.  Wasn't sure if she was tolerating the iron or not

## 2020-06-12 LAB
ALBUMIN SERPL-MCNC: 2.4 G/DL (ref 3.4–5)
ALP SERPL-CCNC: 152 U/L (ref 40–150)
ALT SERPL W P-5'-P-CCNC: 19 U/L (ref 0–50)
ANION GAP SERPL CALCULATED.3IONS-SCNC: 8 MMOL/L (ref 3–14)
AST SERPL W P-5'-P-CCNC: 18 U/L (ref 0–45)
BILIRUB SERPL-MCNC: 0.2 MG/DL (ref 0.2–1.3)
BUN SERPL-MCNC: 11 MG/DL (ref 7–30)
CALCIUM SERPL-MCNC: 9.2 MG/DL (ref 8.5–10.1)
CHLORIDE SERPL-SCNC: 108 MMOL/L (ref 94–109)
CO2 SERPL-SCNC: 20 MMOL/L (ref 20–32)
CREAT SERPL-MCNC: 0.52 MG/DL (ref 0.52–1.04)
CREAT UR-MCNC: 85 MG/DL
GFR SERPL CREATININE-BSD FRML MDRD: >90 ML/MIN/{1.73_M2}
GLUCOSE SERPL-MCNC: 115 MG/DL (ref 70–99)
POTASSIUM SERPL-SCNC: 3.7 MMOL/L (ref 3.4–5.3)
PROT SERPL-MCNC: 6.5 G/DL (ref 6.8–8.8)
PROT UR-MCNC: 0.4 G/L
PROT/CREAT 24H UR: 0.47 G/G CR (ref 0–0.2)
SODIUM SERPL-SCNC: 136 MMOL/L (ref 133–144)

## 2020-06-15 ENCOUNTER — PREP FOR PROCEDURE (OUTPATIENT)
Dept: OBGYN | Facility: CLINIC | Age: 40
End: 2020-06-15

## 2020-06-15 ENCOUNTER — HOSPITAL ENCOUNTER (OUTPATIENT)
Dept: ULTRASOUND IMAGING | Facility: CLINIC | Age: 40
End: 2020-06-15
Attending: OBSTETRICS & GYNECOLOGY
Payer: COMMERCIAL

## 2020-06-15 ENCOUNTER — OFFICE VISIT (OUTPATIENT)
Dept: MATERNAL FETAL MEDICINE | Facility: CLINIC | Age: 40
End: 2020-06-15
Attending: OBSTETRICS & GYNECOLOGY
Payer: COMMERCIAL

## 2020-06-15 DIAGNOSIS — O30.043 DICHORIONIC DIAMNIOTIC TWIN PREGNANCY IN THIRD TRIMESTER: Primary | ICD-10-CM

## 2020-06-15 DIAGNOSIS — O14.90 PRE-ECLAMPSIA, ANTEPARTUM: Primary | ICD-10-CM

## 2020-06-15 DIAGNOSIS — O30.043 DICHORIONIC DIAMNIOTIC TWIN PREGNANCY IN THIRD TRIMESTER: ICD-10-CM

## 2020-06-15 PROCEDURE — 76819 FETAL BIOPHYS PROFIL W/O NST: CPT | Mod: 59 | Performed by: OBSTETRICS & GYNECOLOGY

## 2020-06-15 PROCEDURE — 76816 OB US FOLLOW-UP PER FETUS: CPT

## 2020-06-15 NOTE — PROGRESS NOTES
Please see full imaging report from [facility] program under imaging tab.    Findings reviewed with Cristin today in person at the time of her visit. She now has a diagnosis of  preeclampsia without severe features. She is planning delivery at 37 weeks and therefore she will be having her cerclage removed in just over a week. Given the change in delivery timing, she has inquired about the need for follow up fetal echo for the PACs - she had a normal twin echo for IVF earlier. We will cancel the echo scheduled with pediatric cardiology for PACs (scheduled at 36 weeks 5 days) but I do recommend a  echo be considered for twin 1, especially if PACs are persistent after birth - please notify pediatrics of this finding. She will have weekly BPPs with us and interval growth every 3 weeks.     Thompson Evans MD  Maternal Fetal Medicine

## 2020-06-16 NOTE — TELEPHONE ENCOUNTER
Per Dr Jang this case needs to be moved to 7/2.  Spoke w/Noa at [facility] for changes.  LVM for pt with new date and time and requesting CB to confirm  Sent Dr Jang an inbox msg via epic to let me know who will assist her due to schedules and limited availability.     Obdulia Del Rio  Surgery Scheduler

## 2020-06-18 ENCOUNTER — PRENATAL OFFICE VISIT (OUTPATIENT)
Dept: OBGYN | Facility: CLINIC | Age: 40
End: 2020-06-18
Payer: COMMERCIAL

## 2020-06-18 ENCOUNTER — ANCILLARY PROCEDURE (OUTPATIENT)
Dept: ULTRASOUND IMAGING | Facility: CLINIC | Age: 40
End: 2020-06-18
Payer: COMMERCIAL

## 2020-06-18 VITALS — SYSTOLIC BLOOD PRESSURE: 122 MMHG | WEIGHT: 200 LBS | DIASTOLIC BLOOD PRESSURE: 76 MMHG | BODY MASS INDEX: 30.41 KG/M2

## 2020-06-18 DIAGNOSIS — O30.009 TWIN PREGNANCY, ANTEPARTUM, UNSPECIFIED MULTIPLE GESTATION TYPE: ICD-10-CM

## 2020-06-18 DIAGNOSIS — O09.93 SUPERVISION OF HIGH RISK PREGNANCY IN THIRD TRIMESTER: ICD-10-CM

## 2020-06-18 LAB
ERYTHROCYTE [DISTWIDTH] IN BLOOD BY AUTOMATED COUNT: 13.3 % (ref 10–15)
HCT VFR BLD AUTO: 34.8 % (ref 35–47)
HGB BLD-MCNC: 11.5 G/DL (ref 11.7–15.7)
MCH RBC QN AUTO: 30.6 PG (ref 26.5–33)
MCHC RBC AUTO-ENTMCNC: 33 G/DL (ref 31.5–36.5)
MCV RBC AUTO: 93 FL (ref 78–100)
PLATELET # BLD AUTO: 187 10E9/L (ref 150–450)
RBC # BLD AUTO: 3.76 10E12/L (ref 3.8–5.2)
WBC # BLD AUTO: 10.9 10E9/L (ref 4–11)

## 2020-06-18 PROCEDURE — 87653 STREP B DNA AMP PROBE: CPT | Performed by: OBSTETRICS & GYNECOLOGY

## 2020-06-18 PROCEDURE — 36415 COLL VENOUS BLD VENIPUNCTURE: CPT | Performed by: OBSTETRICS & GYNECOLOGY

## 2020-06-18 PROCEDURE — 85027 COMPLETE CBC AUTOMATED: CPT | Performed by: OBSTETRICS & GYNECOLOGY

## 2020-06-18 PROCEDURE — 76819 FETAL BIOPHYS PROFIL W/O NST: CPT | Mod: 59 | Performed by: OBSTETRICS & GYNECOLOGY

## 2020-06-18 PROCEDURE — 80053 COMPREHEN METABOLIC PANEL: CPT | Performed by: OBSTETRICS & GYNECOLOGY

## 2020-06-18 PROCEDURE — 76819 FETAL BIOPHYS PROFIL W/O NST: CPT | Performed by: OBSTETRICS & GYNECOLOGY

## 2020-06-18 PROCEDURE — 99207 ZZC PRENATAL VISIT: CPT | Performed by: OBSTETRICS & GYNECOLOGY

## 2020-06-18 NOTE — PROGRESS NOTES
Cerclage removal today  GBS today  bpps 8/8 x2  Mild pre-eclampsia  Continue to watch bps  Labs today and weekly until delivery  37 week  section

## 2020-06-19 DIAGNOSIS — O30.043 DICHORIONIC DIAMNIOTIC TWIN PREGNANCY IN THIRD TRIMESTER: Primary | ICD-10-CM

## 2020-06-19 LAB
ALBUMIN SERPL-MCNC: 2.5 G/DL (ref 3.4–5)
ALP SERPL-CCNC: 168 U/L (ref 40–150)
ALT SERPL W P-5'-P-CCNC: 20 U/L (ref 0–50)
ANION GAP SERPL CALCULATED.3IONS-SCNC: 7 MMOL/L (ref 3–14)
AST SERPL W P-5'-P-CCNC: 17 U/L (ref 0–45)
BILIRUB SERPL-MCNC: 0.2 MG/DL (ref 0.2–1.3)
BUN SERPL-MCNC: 14 MG/DL (ref 7–30)
CALCIUM SERPL-MCNC: 10.1 MG/DL (ref 8.5–10.1)
CHLORIDE SERPL-SCNC: 108 MMOL/L (ref 94–109)
CO2 SERPL-SCNC: 23 MMOL/L (ref 20–32)
CREAT SERPL-MCNC: 0.66 MG/DL (ref 0.52–1.04)
GFR SERPL CREATININE-BSD FRML MDRD: >90 ML/MIN/{1.73_M2}
GLUCOSE SERPL-MCNC: 92 MG/DL (ref 70–99)
GP B STREP DNA SPEC QL NAA+PROBE: NEGATIVE
POTASSIUM SERPL-SCNC: 4.1 MMOL/L (ref 3.4–5.3)
PROT SERPL-MCNC: 6.6 G/DL (ref 6.8–8.8)
SODIUM SERPL-SCNC: 138 MMOL/L (ref 133–144)
SPECIMEN SOURCE: NORMAL

## 2020-06-22 ENCOUNTER — HOSPITAL ENCOUNTER (OUTPATIENT)
Dept: ULTRASOUND IMAGING | Facility: CLINIC | Age: 40
End: 2020-06-22
Attending: OBSTETRICS & GYNECOLOGY
Payer: COMMERCIAL

## 2020-06-22 ENCOUNTER — OFFICE VISIT (OUTPATIENT)
Dept: MATERNAL FETAL MEDICINE | Facility: CLINIC | Age: 40
End: 2020-06-22
Attending: OBSTETRICS & GYNECOLOGY
Payer: COMMERCIAL

## 2020-06-22 DIAGNOSIS — O30.043 DICHORIONIC DIAMNIOTIC TWIN PREGNANCY IN THIRD TRIMESTER: ICD-10-CM

## 2020-06-22 DIAGNOSIS — O40.3XX2 POLYHYDRAMNIOS IN THIRD TRIMESTER COMPLICATION, FETUS 2 OF MULTIPLE GESTATION: ICD-10-CM

## 2020-06-22 DIAGNOSIS — O30.043 DICHORIONIC DIAMNIOTIC TWIN PREGNANCY IN THIRD TRIMESTER: Primary | ICD-10-CM

## 2020-06-22 PROCEDURE — 76816 OB US FOLLOW-UP PER FETUS: CPT | Mod: 59

## 2020-06-22 PROCEDURE — 76819 FETAL BIOPHYS PROFIL W/O NST: CPT | Mod: 59 | Performed by: OBSTETRICS & GYNECOLOGY

## 2020-06-22 RX ORDER — CEFAZOLIN SODIUM 1 G/3ML
1 INJECTION, POWDER, FOR SOLUTION INTRAMUSCULAR; INTRAVENOUS SEE ADMIN INSTRUCTIONS
Status: CANCELLED | OUTPATIENT
Start: 2020-06-22

## 2020-06-22 RX ORDER — ACETAMINOPHEN 325 MG/1
975 TABLET ORAL ONCE
Status: CANCELLED | OUTPATIENT
Start: 2020-06-22 | End: 2020-06-22

## 2020-06-22 RX ORDER — CEFAZOLIN SODIUM 2 G/100ML
2 INJECTION, SOLUTION INTRAVENOUS
Status: CANCELLED | OUTPATIENT
Start: 2020-06-22

## 2020-06-22 RX ORDER — CITRIC ACID/SODIUM CITRATE 334-500MG
30 SOLUTION, ORAL ORAL
Status: CANCELLED | OUTPATIENT
Start: 2020-06-22

## 2020-06-22 RX ORDER — SODIUM CHLORIDE, SODIUM LACTATE, POTASSIUM CHLORIDE, CALCIUM CHLORIDE 600; 310; 30; 20 MG/100ML; MG/100ML; MG/100ML; MG/100ML
INJECTION, SOLUTION INTRAVENOUS CONTINUOUS
Status: CANCELLED | OUTPATIENT
Start: 2020-06-22

## 2020-06-22 RX ORDER — LIDOCAINE 40 MG/G
CREAM TOPICAL
Status: CANCELLED | OUTPATIENT
Start: 2020-06-22

## 2020-06-22 NOTE — PROGRESS NOTES
Please see full imaging report from [facility] program under imaging tab.    Findings reviewed in person with Cristin today. She denies any symptoms suggesting worsening of her preeclampsia/gestational hypertension and denies headache, vision changes and epigastric pain. She will continue with twice weekly BPPs delivery at 37 weeks.     Please have twin 1 have a  echo 24-48 hours after delivery, given persistent PACs.     Thompson Evans MD  Maternal Fetal Medicine

## 2020-06-25 ENCOUNTER — ANCILLARY PROCEDURE (OUTPATIENT)
Dept: ULTRASOUND IMAGING | Facility: CLINIC | Age: 40
End: 2020-06-25
Payer: COMMERCIAL

## 2020-06-25 ENCOUNTER — PRENATAL OFFICE VISIT (OUTPATIENT)
Dept: OBGYN | Facility: CLINIC | Age: 40
End: 2020-06-25
Payer: COMMERCIAL

## 2020-06-25 VITALS — BODY MASS INDEX: 30.68 KG/M2 | DIASTOLIC BLOOD PRESSURE: 64 MMHG | WEIGHT: 201.8 LBS | SYSTOLIC BLOOD PRESSURE: 136 MMHG

## 2020-06-25 DIAGNOSIS — O09.93 SUPERVISION OF HIGH RISK PREGNANCY IN THIRD TRIMESTER: ICD-10-CM

## 2020-06-25 DIAGNOSIS — O14.90 PRE-ECLAMPSIA, ANTEPARTUM: ICD-10-CM

## 2020-06-25 LAB
BASOPHILS # BLD AUTO: 0 10E9/L (ref 0–0.2)
BASOPHILS NFR BLD AUTO: 0.2 %
DIFFERENTIAL METHOD BLD: ABNORMAL
EOSINOPHIL # BLD AUTO: 0.1 10E9/L (ref 0–0.7)
EOSINOPHIL NFR BLD AUTO: 1.5 %
ERYTHROCYTE [DISTWIDTH] IN BLOOD BY AUTOMATED COUNT: 13.1 % (ref 10–15)
HCT VFR BLD AUTO: 33.4 % (ref 35–47)
HGB BLD-MCNC: 11.3 G/DL (ref 11.7–15.7)
LYMPHOCYTES # BLD AUTO: 1.5 10E9/L (ref 0.8–5.3)
LYMPHOCYTES NFR BLD AUTO: 15.8 %
MCH RBC QN AUTO: 30.8 PG (ref 26.5–33)
MCHC RBC AUTO-ENTMCNC: 33.8 G/DL (ref 31.5–36.5)
MCV RBC AUTO: 91 FL (ref 78–100)
MONOCYTES # BLD AUTO: 0.7 10E9/L (ref 0–1.3)
MONOCYTES NFR BLD AUTO: 7.2 %
NEUTROPHILS # BLD AUTO: 7.1 10E9/L (ref 1.6–8.3)
NEUTROPHILS NFR BLD AUTO: 75.3 %
PLATELET # BLD AUTO: 173 10E9/L (ref 150–450)
RBC # BLD AUTO: 3.67 10E12/L (ref 3.8–5.2)
WBC # BLD AUTO: 9.5 10E9/L (ref 4–11)

## 2020-06-25 PROCEDURE — 36415 COLL VENOUS BLD VENIPUNCTURE: CPT | Performed by: OBSTETRICS & GYNECOLOGY

## 2020-06-25 PROCEDURE — 85025 COMPLETE CBC W/AUTO DIFF WBC: CPT | Performed by: OBSTETRICS & GYNECOLOGY

## 2020-06-25 PROCEDURE — 76819 FETAL BIOPHYS PROFIL W/O NST: CPT | Mod: 59 | Performed by: OBSTETRICS & GYNECOLOGY

## 2020-06-25 PROCEDURE — 76819 FETAL BIOPHYS PROFIL W/O NST: CPT | Performed by: OBSTETRICS & GYNECOLOGY

## 2020-06-25 PROCEDURE — 99207 ZZC PRENATAL VISIT: CPT | Performed by: OBSTETRICS & GYNECOLOGY

## 2020-06-25 PROCEDURE — 80053 COMPREHEN METABOLIC PANEL: CPT | Performed by: OBSTETRICS & GYNECOLOGY

## 2020-06-25 NOTE — PROGRESS NOTES
Has section next week 7/2  bpps 8/8 x 2  Labs today  She hasn't had any bleeding or contractions  She has had all normal and mild range bps at home  Ordered covid testing again

## 2020-06-26 LAB
ALBUMIN SERPL-MCNC: 2.5 G/DL (ref 3.4–5)
ALP SERPL-CCNC: 173 U/L (ref 40–150)
ALT SERPL W P-5'-P-CCNC: 19 U/L (ref 0–50)
ANION GAP SERPL CALCULATED.3IONS-SCNC: 6 MMOL/L (ref 3–14)
AST SERPL W P-5'-P-CCNC: 16 U/L (ref 0–45)
BILIRUB SERPL-MCNC: 0.3 MG/DL (ref 0.2–1.3)
BUN SERPL-MCNC: 11 MG/DL (ref 7–30)
CALCIUM SERPL-MCNC: 9 MG/DL (ref 8.5–10.1)
CHLORIDE SERPL-SCNC: 108 MMOL/L (ref 94–109)
CO2 SERPL-SCNC: 22 MMOL/L (ref 20–32)
CREAT SERPL-MCNC: 0.6 MG/DL (ref 0.52–1.04)
GFR SERPL CREATININE-BSD FRML MDRD: >90 ML/MIN/{1.73_M2}
GLUCOSE SERPL-MCNC: 80 MG/DL (ref 70–99)
POTASSIUM SERPL-SCNC: 4.1 MMOL/L (ref 3.4–5.3)
PROT SERPL-MCNC: 6.2 G/DL (ref 6.8–8.8)
SODIUM SERPL-SCNC: 136 MMOL/L (ref 133–144)

## 2020-06-29 ENCOUNTER — HOSPITAL ENCOUNTER (INPATIENT)
Facility: CLINIC | Age: 40
LOS: 3 days | Discharge: HOME | End: 2020-07-02
Attending: OBSTETRICS & GYNECOLOGY | Admitting: OBSTETRICS & GYNECOLOGY
Payer: COMMERCIAL

## 2020-06-29 ENCOUNTER — ANESTHESIA (OUTPATIENT)
Dept: OBGYN | Facility: CLINIC | Age: 40
End: 2020-06-29
Payer: COMMERCIAL

## 2020-06-29 ENCOUNTER — HOSPITAL ENCOUNTER (OUTPATIENT)
Dept: ULTRASOUND IMAGING | Facility: CLINIC | Age: 40
End: 2020-06-29
Attending: OBSTETRICS & GYNECOLOGY
Payer: COMMERCIAL

## 2020-06-29 ENCOUNTER — ANESTHESIA EVENT (OUTPATIENT)
Dept: OBGYN | Facility: CLINIC | Age: 40
End: 2020-06-29
Payer: COMMERCIAL

## 2020-06-29 DIAGNOSIS — O14.90 PRE-ECLAMPSIA, ANTEPARTUM: ICD-10-CM

## 2020-06-29 DIAGNOSIS — O30.009 TWIN PREGNANCY, ANTEPARTUM: ICD-10-CM

## 2020-06-29 LAB
ABO + RH BLD: NORMAL
ABO + RH BLD: NORMAL
ALT SERPL W P-5'-P-CCNC: 19 U/L (ref 0–50)
AMPHETAMINES UR QL SCN: NEGATIVE
ANION GAP SERPL CALCULATED.3IONS-SCNC: 7 MMOL/L (ref 3–14)
AST SERPL W P-5'-P-CCNC: 18 U/L (ref 0–45)
BLD GP AB SCN SERPL QL: NORMAL
BLOOD BANK CMNT PATIENT-IMP: NORMAL
BUN SERPL-MCNC: 14 MG/DL (ref 7–30)
CALCIUM SERPL-MCNC: 9.2 MG/DL (ref 8.5–10.1)
CANNABINOIDS UR QL: NEGATIVE
CHLORIDE SERPL-SCNC: 111 MMOL/L (ref 94–109)
CO2 SERPL-SCNC: 19 MMOL/L (ref 20–32)
COCAINE UR QL: NEGATIVE
CREAT SERPL-MCNC: 0.58 MG/DL (ref 0.52–1.04)
ERYTHROCYTE [DISTWIDTH] IN BLOOD BY AUTOMATED COUNT: 13.2 % (ref 10–15)
GFR SERPL CREATININE-BSD FRML MDRD: >90 ML/MIN/{1.73_M2}
GLUCOSE SERPL-MCNC: 92 MG/DL (ref 70–99)
HCT VFR BLD AUTO: 32.4 % (ref 35–47)
HGB BLD-MCNC: 11 G/DL (ref 11.7–15.7)
MCH RBC QN AUTO: 30.7 PG (ref 26.5–33)
MCHC RBC AUTO-ENTMCNC: 34 G/DL (ref 31.5–36.5)
MCV RBC AUTO: 91 FL (ref 78–100)
OPIATES UR QL SCN: NEGATIVE
PCP UR QL SCN: NEGATIVE
PLATELET # BLD AUTO: 196 10E9/L (ref 150–450)
POTASSIUM SERPL-SCNC: 4 MMOL/L (ref 3.4–5.3)
RBC # BLD AUTO: 3.58 10E12/L (ref 3.8–5.2)
SARS-COV-2 PCR COMMENT: NORMAL
SARS-COV-2 RNA SPEC QL NAA+PROBE: NEGATIVE
SARS-COV-2 RNA SPEC QL NAA+PROBE: NORMAL
SODIUM SERPL-SCNC: 137 MMOL/L (ref 133–144)
SPECIMEN EXP DATE BLD: NORMAL
SPECIMEN SOURCE: NORMAL
SPECIMEN SOURCE: NORMAL
WBC # BLD AUTO: 9.6 10E9/L (ref 4–11)

## 2020-06-29 PROCEDURE — G0463 HOSPITAL OUTPT CLINIC VISIT: HCPCS

## 2020-06-29 PROCEDURE — G0463 HOSPITAL OUTPT CLINIC VISIT: HCPCS | Mod: 25

## 2020-06-29 PROCEDURE — 86780 TREPONEMA PALLIDUM: CPT | Performed by: OBSTETRICS & GYNECOLOGY

## 2020-06-29 PROCEDURE — 80307 DRUG TEST PRSMV CHEM ANLYZR: CPT | Performed by: OBSTETRICS & GYNECOLOGY

## 2020-06-29 PROCEDURE — 36000058 ZZH SURGERY LEVEL 3 EA 15 ADDTL MIN: Performed by: OBSTETRICS & GYNECOLOGY

## 2020-06-29 PROCEDURE — 25000125 ZZHC RX 250: Performed by: NURSE ANESTHETIST, CERTIFIED REGISTERED

## 2020-06-29 PROCEDURE — 36000056 ZZH SURGERY LEVEL 3 1ST 30 MIN: Performed by: OBSTETRICS & GYNECOLOGY

## 2020-06-29 PROCEDURE — 71000015 ZZH RECOVERY PHASE 1 LEVEL 2 EA ADDTL HR: Performed by: OBSTETRICS & GYNECOLOGY

## 2020-06-29 PROCEDURE — 25800030 ZZH RX IP 258 OP 636: Performed by: NURSE ANESTHETIST, CERTIFIED REGISTERED

## 2020-06-29 PROCEDURE — 86850 RBC ANTIBODY SCREEN: CPT | Performed by: OBSTETRICS & GYNECOLOGY

## 2020-06-29 PROCEDURE — 37000008 ZZH ANESTHESIA TECHNICAL FEE, 1ST 30 MIN: Performed by: OBSTETRICS & GYNECOLOGY

## 2020-06-29 PROCEDURE — 76816 OB US FOLLOW-UP PER FETUS: CPT

## 2020-06-29 PROCEDURE — 25000128 H RX IP 250 OP 636: Performed by: NURSE ANESTHETIST, CERTIFIED REGISTERED

## 2020-06-29 PROCEDURE — 12000035 ZZH R&B POSTPARTUM

## 2020-06-29 PROCEDURE — 37000009 ZZH ANESTHESIA TECHNICAL FEE, EACH ADDTL 15 MIN: Performed by: OBSTETRICS & GYNECOLOGY

## 2020-06-29 PROCEDURE — 25000128 H RX IP 250 OP 636: Performed by: OBSTETRICS & GYNECOLOGY

## 2020-06-29 PROCEDURE — 59510 CESAREAN DELIVERY: CPT | Mod: 22 | Performed by: OBSTETRICS & GYNECOLOGY

## 2020-06-29 PROCEDURE — 88307 TISSUE EXAM BY PATHOLOGIST: CPT | Mod: 26,59 | Performed by: OBSTETRICS & GYNECOLOGY

## 2020-06-29 PROCEDURE — 80048 BASIC METABOLIC PNL TOTAL CA: CPT | Performed by: OBSTETRICS & GYNECOLOGY

## 2020-06-29 PROCEDURE — U0003 INFECTIOUS AGENT DETECTION BY NUCLEIC ACID (DNA OR RNA); SEVERE ACUTE RESPIRATORY SYNDROME CORONAVIRUS 2 (SARS-COV-2) (CORONAVIRUS DISEASE [COVID-19]), AMPLIFIED PROBE TECHNIQUE, MAKING USE OF HIGH THROUGHPUT TECHNOLOGIES AS DESCRIBED BY CMS-2020-01-R: HCPCS | Performed by: OBSTETRICS & GYNECOLOGY

## 2020-06-29 PROCEDURE — 88307 TISSUE EXAM BY PATHOLOGIST: CPT | Performed by: OBSTETRICS & GYNECOLOGY

## 2020-06-29 PROCEDURE — 84450 TRANSFERASE (AST) (SGOT): CPT | Performed by: OBSTETRICS & GYNECOLOGY

## 2020-06-29 PROCEDURE — 86901 BLOOD TYPING SEROLOGIC RH(D): CPT | Performed by: OBSTETRICS & GYNECOLOGY

## 2020-06-29 PROCEDURE — 84460 ALANINE AMINO (ALT) (SGPT): CPT | Performed by: OBSTETRICS & GYNECOLOGY

## 2020-06-29 PROCEDURE — 36415 COLL VENOUS BLD VENIPUNCTURE: CPT | Performed by: OBSTETRICS & GYNECOLOGY

## 2020-06-29 PROCEDURE — 76819 FETAL BIOPHYS PROFIL W/O NST: CPT | Mod: 59 | Performed by: OBSTETRICS & GYNECOLOGY

## 2020-06-29 PROCEDURE — 25000125 ZZHC RX 250

## 2020-06-29 PROCEDURE — 85027 COMPLETE CBC AUTOMATED: CPT | Performed by: OBSTETRICS & GYNECOLOGY

## 2020-06-29 PROCEDURE — 86900 BLOOD TYPING SEROLOGIC ABO: CPT | Performed by: OBSTETRICS & GYNECOLOGY

## 2020-06-29 PROCEDURE — 25800030 ZZH RX IP 258 OP 636: Performed by: OBSTETRICS & GYNECOLOGY

## 2020-06-29 PROCEDURE — 25000132 ZZH RX MED GY IP 250 OP 250 PS 637: Performed by: OBSTETRICS & GYNECOLOGY

## 2020-06-29 PROCEDURE — 25000128 H RX IP 250 OP 636

## 2020-06-29 PROCEDURE — 71000014 ZZH RECOVERY PHASE 1 LEVEL 2 FIRST HR: Performed by: OBSTETRICS & GYNECOLOGY

## 2020-06-29 PROCEDURE — 27210794 ZZH OR GENERAL SUPPLY STERILE: Performed by: OBSTETRICS & GYNECOLOGY

## 2020-06-29 RX ORDER — BUPIVACAINE HYDROCHLORIDE 7.5 MG/ML
INJECTION, SOLUTION INTRASPINAL PRN
Status: DISCONTINUED | OUTPATIENT
Start: 2020-06-29 | End: 2020-06-29

## 2020-06-29 RX ORDER — NIFEDIPINE 10 MG/1
10 CAPSULE ORAL
Status: DISCONTINUED | OUTPATIENT
Start: 2020-06-29 | End: 2020-07-02 | Stop reason: HOSPADM

## 2020-06-29 RX ORDER — LIDOCAINE 40 MG/G
CREAM TOPICAL
Status: DISCONTINUED | OUTPATIENT
Start: 2020-06-29 | End: 2020-06-29

## 2020-06-29 RX ORDER — ACETAMINOPHEN 325 MG/1
975 TABLET ORAL EVERY 6 HOURS
Status: DISCONTINUED | OUTPATIENT
Start: 2020-06-29 | End: 2020-06-29

## 2020-06-29 RX ORDER — MAGNESIUM SULFATE HEPTAHYDRATE 40 MG/ML
4 INJECTION, SOLUTION INTRAVENOUS
Status: CANCELLED | OUTPATIENT
Start: 2020-06-29

## 2020-06-29 RX ORDER — CEFAZOLIN SODIUM 2 G/100ML
2 INJECTION, SOLUTION INTRAVENOUS
Status: COMPLETED | OUTPATIENT
Start: 2020-06-29 | End: 2020-06-29

## 2020-06-29 RX ORDER — OXYTOCIN 10 [USP'U]/ML
10 INJECTION, SOLUTION INTRAMUSCULAR; INTRAVENOUS
Status: DISCONTINUED | OUTPATIENT
Start: 2020-06-29 | End: 2020-07-02 | Stop reason: HOSPADM

## 2020-06-29 RX ORDER — OXYTOCIN/0.9 % SODIUM CHLORIDE 30/500 ML
100 PLASTIC BAG, INJECTION (ML) INTRAVENOUS CONTINUOUS
Status: DISCONTINUED | OUTPATIENT
Start: 2020-06-29 | End: 2020-06-29

## 2020-06-29 RX ORDER — KETOROLAC TROMETHAMINE 30 MG/ML
15 INJECTION, SOLUTION INTRAMUSCULAR; INTRAVENOUS EVERY 6 HOURS
Status: COMPLETED | OUTPATIENT
Start: 2020-06-29 | End: 2020-06-30

## 2020-06-29 RX ORDER — EPHEDRINE SULFATE 50 MG/ML
INJECTION, SOLUTION INTRAMUSCULAR; INTRAVENOUS; SUBCUTANEOUS PRN
Status: DISCONTINUED | OUTPATIENT
Start: 2020-06-29 | End: 2020-06-29

## 2020-06-29 RX ORDER — ONDANSETRON 2 MG/ML
4 INJECTION INTRAMUSCULAR; INTRAVENOUS EVERY 6 HOURS PRN
Status: DISCONTINUED | OUTPATIENT
Start: 2020-06-29 | End: 2020-07-02 | Stop reason: HOSPADM

## 2020-06-29 RX ORDER — MAGNESIUM SULFATE HEPTAHYDRATE 500 MG/ML
4 INJECTION, SOLUTION INTRAMUSCULAR; INTRAVENOUS
Status: DISCONTINUED | OUTPATIENT
Start: 2020-06-29 | End: 2020-07-02 | Stop reason: HOSPADM

## 2020-06-29 RX ORDER — ACETAMINOPHEN 325 MG/1
975 TABLET ORAL EVERY 6 HOURS
Status: DISCONTINUED | OUTPATIENT
Start: 2020-06-30 | End: 2020-07-02 | Stop reason: HOSPADM

## 2020-06-29 RX ORDER — ONDANSETRON 2 MG/ML
4 INJECTION INTRAMUSCULAR; INTRAVENOUS EVERY 6 HOURS PRN
Status: DISCONTINUED | OUTPATIENT
Start: 2020-06-29 | End: 2020-06-29

## 2020-06-29 RX ORDER — MAGNESIUM SULFATE HEPTAHYDRATE 40 MG/ML
2 INJECTION, SOLUTION INTRAVENOUS
Status: CANCELLED | OUTPATIENT
Start: 2020-06-29

## 2020-06-29 RX ORDER — OXYTOCIN/0.9 % SODIUM CHLORIDE 30/500 ML
PLASTIC BAG, INJECTION (ML) INTRAVENOUS
Status: COMPLETED
Start: 2020-06-29 | End: 2020-06-29

## 2020-06-29 RX ORDER — MAGNESIUM SULFATE IN WATER 40 MG/ML
INJECTION, SOLUTION INTRAVENOUS
Status: DISCONTINUED
Start: 2020-06-29 | End: 2020-06-29 | Stop reason: HOSPADM

## 2020-06-29 RX ORDER — MAGNESIUM SULFATE HEPTAHYDRATE 40 MG/ML
INJECTION, SOLUTION INTRAVENOUS
Status: DISCONTINUED
Start: 2020-06-29 | End: 2020-06-29 | Stop reason: HOSPADM

## 2020-06-29 RX ORDER — OXYTOCIN/0.9 % SODIUM CHLORIDE 30/500 ML
PLASTIC BAG, INJECTION (ML) INTRAVENOUS CONTINUOUS PRN
Status: DISCONTINUED | OUTPATIENT
Start: 2020-06-29 | End: 2020-06-29

## 2020-06-29 RX ORDER — KETOROLAC TROMETHAMINE 30 MG/ML
15 INJECTION, SOLUTION INTRAMUSCULAR; INTRAVENOUS EVERY 6 HOURS PRN
Status: DISCONTINUED | OUTPATIENT
Start: 2020-06-29 | End: 2020-06-29

## 2020-06-29 RX ORDER — BISACODYL 10 MG
10 SUPPOSITORY, RECTAL RECTAL DAILY PRN
Status: DISCONTINUED | OUTPATIENT
Start: 2020-07-01 | End: 2020-07-02 | Stop reason: HOSPADM

## 2020-06-29 RX ORDER — LORAZEPAM 2 MG/ML
2 INJECTION INTRAMUSCULAR
Status: CANCELLED | OUTPATIENT
Start: 2020-06-29

## 2020-06-29 RX ORDER — LABETALOL HYDROCHLORIDE 5 MG/ML
20 INJECTION, SOLUTION INTRAVENOUS EVERY 10 MIN PRN
Status: DISCONTINUED | OUTPATIENT
Start: 2020-06-29 | End: 2020-07-02 | Stop reason: HOSPADM

## 2020-06-29 RX ORDER — MISOPROSTOL 200 UG/1
800 TABLET ORAL
Status: DISCONTINUED | OUTPATIENT
Start: 2020-06-29 | End: 2020-07-02 | Stop reason: HOSPADM

## 2020-06-29 RX ORDER — IBUPROFEN 600 MG/1
600 TABLET, FILM COATED ORAL EVERY 6 HOURS PRN
Status: DISCONTINUED | OUTPATIENT
Start: 2020-06-29 | End: 2020-07-02 | Stop reason: HOSPADM

## 2020-06-29 RX ORDER — KETOROLAC TROMETHAMINE 30 MG/ML
INJECTION, SOLUTION INTRAMUSCULAR; INTRAVENOUS
Status: DISCONTINUED
Start: 2020-06-29 | End: 2020-06-29 | Stop reason: WASHOUT

## 2020-06-29 RX ORDER — SIMETHICONE 80 MG
80 TABLET,CHEWABLE ORAL 4 TIMES DAILY PRN
Status: DISCONTINUED | OUTPATIENT
Start: 2020-06-29 | End: 2020-07-02 | Stop reason: HOSPADM

## 2020-06-29 RX ORDER — MORPHINE SULFATE 1 MG/ML
INJECTION, SOLUTION EPIDURAL; INTRATHECAL; INTRAVENOUS PRN
Status: DISCONTINUED | OUTPATIENT
Start: 2020-06-29 | End: 2020-06-29

## 2020-06-29 RX ORDER — TRANEXAMIC ACID 10 MG/ML
1 INJECTION, SOLUTION INTRAVENOUS EVERY 30 MIN PRN
Status: DISCONTINUED | OUTPATIENT
Start: 2020-06-29 | End: 2020-07-02 | Stop reason: HOSPADM

## 2020-06-29 RX ORDER — OXYCODONE HYDROCHLORIDE 5 MG/1
5 TABLET ORAL EVERY 4 HOURS PRN
Status: DISCONTINUED | OUTPATIENT
Start: 2020-06-29 | End: 2020-07-02 | Stop reason: HOSPADM

## 2020-06-29 RX ORDER — SODIUM CHLORIDE, SODIUM LACTATE, POTASSIUM CHLORIDE, CALCIUM CHLORIDE 600; 310; 30; 20 MG/100ML; MG/100ML; MG/100ML; MG/100ML
10-125 INJECTION, SOLUTION INTRAVENOUS CONTINUOUS
Status: DISCONTINUED | OUTPATIENT
Start: 2020-06-29 | End: 2020-07-02 | Stop reason: HOSPADM

## 2020-06-29 RX ORDER — LIDOCAINE 40 MG/G
CREAM TOPICAL
Status: DISCONTINUED | OUTPATIENT
Start: 2020-06-29 | End: 2020-07-02 | Stop reason: HOSPADM

## 2020-06-29 RX ORDER — OXYCODONE HYDROCHLORIDE 5 MG/1
5 TABLET ORAL EVERY 4 HOURS PRN
Status: DISCONTINUED | OUTPATIENT
Start: 2020-06-29 | End: 2020-06-29

## 2020-06-29 RX ORDER — CALCIUM GLUCONATE 94 MG/ML
1 INJECTION, SOLUTION INTRAVENOUS
Status: DISCONTINUED | OUTPATIENT
Start: 2020-06-29 | End: 2020-07-02 | Stop reason: HOSPADM

## 2020-06-29 RX ORDER — ONDANSETRON 4 MG/1
4 TABLET, ORALLY DISINTEGRATING ORAL EVERY 6 HOURS PRN
Status: DISCONTINUED | OUTPATIENT
Start: 2020-06-29 | End: 2020-07-02 | Stop reason: HOSPADM

## 2020-06-29 RX ORDER — IBUPROFEN 400 MG/1
800 TABLET, FILM COATED ORAL EVERY 6 HOURS
Status: DISCONTINUED | OUTPATIENT
Start: 2020-06-30 | End: 2020-07-02 | Stop reason: HOSPADM

## 2020-06-29 RX ORDER — CEFAZOLIN SODIUM 1 G/3ML
1 INJECTION, POWDER, FOR SOLUTION INTRAMUSCULAR; INTRAVENOUS SEE ADMIN INSTRUCTIONS
Status: DISCONTINUED | OUTPATIENT
Start: 2020-06-29 | End: 2020-06-29 | Stop reason: CLARIF

## 2020-06-29 RX ORDER — MAGNESIUM SULFATE HEPTAHYDRATE 40 MG/ML
4 INJECTION, SOLUTION INTRAVENOUS ONCE
Status: COMPLETED | OUTPATIENT
Start: 2020-06-29 | End: 2020-06-29

## 2020-06-29 RX ORDER — LABETALOL HYDROCHLORIDE 5 MG/ML
80 INJECTION, SOLUTION INTRAVENOUS EVERY 10 MIN PRN
Status: DISCONTINUED | OUTPATIENT
Start: 2020-06-29 | End: 2020-07-02 | Stop reason: HOSPADM

## 2020-06-29 RX ORDER — NIFEDIPINE 10 MG/1
CAPSULE ORAL
Status: DISCONTINUED
Start: 2020-06-29 | End: 2020-06-29 | Stop reason: HOSPADM

## 2020-06-29 RX ORDER — CITRIC ACID/SODIUM CITRATE 334-500MG
SOLUTION, ORAL ORAL
Status: DISCONTINUED
Start: 2020-06-29 | End: 2020-06-29 | Stop reason: HOSPADM

## 2020-06-29 RX ORDER — MAGNESIUM SULFATE HEPTAHYDRATE 40 MG/ML
4 INJECTION, SOLUTION INTRAVENOUS ONCE
Status: CANCELLED | OUTPATIENT
Start: 2020-06-29 | End: 2020-06-29

## 2020-06-29 RX ORDER — KETOROLAC TROMETHAMINE 30 MG/ML
30 INJECTION, SOLUTION INTRAMUSCULAR; INTRAVENOUS EVERY 6 HOURS
Status: DISCONTINUED | OUTPATIENT
Start: 2020-06-30 | End: 2020-06-29

## 2020-06-29 RX ORDER — AMOXICILLIN 250 MG
2 CAPSULE ORAL 2 TIMES DAILY
Status: DISCONTINUED | OUTPATIENT
Start: 2020-06-29 | End: 2020-07-02 | Stop reason: HOSPADM

## 2020-06-29 RX ORDER — ACETAMINOPHEN 325 MG/1
TABLET ORAL
Status: DISCONTINUED
Start: 2020-06-29 | End: 2020-06-29 | Stop reason: HOSPADM

## 2020-06-29 RX ORDER — NALBUPHINE HYDROCHLORIDE 10 MG/ML
2.5-5 INJECTION, SOLUTION INTRAMUSCULAR; INTRAVENOUS; SUBCUTANEOUS EVERY 6 HOURS PRN
Status: DISCONTINUED | OUTPATIENT
Start: 2020-06-29 | End: 2020-07-02 | Stop reason: HOSPADM

## 2020-06-29 RX ORDER — MODIFIED LANOLIN
OINTMENT (GRAM) TOPICAL
Status: DISCONTINUED | OUTPATIENT
Start: 2020-06-29 | End: 2020-07-02 | Stop reason: HOSPADM

## 2020-06-29 RX ORDER — LABETALOL HYDROCHLORIDE 5 MG/ML
40 INJECTION, SOLUTION INTRAVENOUS EVERY 10 MIN PRN
Status: DISCONTINUED | OUTPATIENT
Start: 2020-06-29 | End: 2020-07-02 | Stop reason: HOSPADM

## 2020-06-29 RX ORDER — CITRIC ACID/SODIUM CITRATE 334-500MG
30 SOLUTION, ORAL ORAL
Status: COMPLETED | OUTPATIENT
Start: 2020-06-29 | End: 2020-06-29

## 2020-06-29 RX ORDER — NIFEDIPINE 10 MG/1
10 CAPSULE ORAL
Status: CANCELLED | OUTPATIENT
Start: 2020-06-29

## 2020-06-29 RX ORDER — CEFAZOLIN SODIUM 2 G/100ML
INJECTION, SOLUTION INTRAVENOUS
Status: DISCONTINUED
Start: 2020-06-29 | End: 2020-06-29 | Stop reason: HOSPADM

## 2020-06-29 RX ORDER — NALOXONE HYDROCHLORIDE 0.4 MG/ML
.1-.4 INJECTION, SOLUTION INTRAMUSCULAR; INTRAVENOUS; SUBCUTANEOUS
Status: DISCONTINUED | OUTPATIENT
Start: 2020-06-29 | End: 2020-07-02 | Stop reason: HOSPADM

## 2020-06-29 RX ORDER — CARBOPROST TROMETHAMINE 250 UG/ML
INJECTION, SOLUTION INTRAMUSCULAR
Status: COMPLETED
Start: 2020-06-29 | End: 2020-06-29

## 2020-06-29 RX ORDER — MAGNESIUM SULFATE IN WATER 40 MG/ML
2 INJECTION, SOLUTION INTRAVENOUS CONTINUOUS
Status: CANCELLED | OUTPATIENT
Start: 2020-06-29

## 2020-06-29 RX ORDER — MAGNESIUM SULFATE HEPTAHYDRATE 40 MG/ML
4 INJECTION, SOLUTION INTRAVENOUS
Status: DISCONTINUED | OUTPATIENT
Start: 2020-06-29 | End: 2020-07-02 | Stop reason: HOSPADM

## 2020-06-29 RX ORDER — HYDROCORTISONE 2.5 %
CREAM (GRAM) TOPICAL 3 TIMES DAILY PRN
Status: DISCONTINUED | OUTPATIENT
Start: 2020-06-29 | End: 2020-07-02 | Stop reason: HOSPADM

## 2020-06-29 RX ORDER — DEXTROSE, SODIUM CHLORIDE, SODIUM LACTATE, POTASSIUM CHLORIDE, AND CALCIUM CHLORIDE 5; .6; .31; .03; .02 G/100ML; G/100ML; G/100ML; G/100ML; G/100ML
INJECTION, SOLUTION INTRAVENOUS CONTINUOUS
Status: DISCONTINUED | OUTPATIENT
Start: 2020-06-29 | End: 2020-07-02 | Stop reason: HOSPADM

## 2020-06-29 RX ORDER — MAGNESIUM SULFATE IN WATER 40 MG/ML
1.5 INJECTION, SOLUTION INTRAVENOUS CONTINUOUS
Status: DISPENSED | OUTPATIENT
Start: 2020-06-29 | End: 2020-06-30

## 2020-06-29 RX ORDER — MAGNESIUM SULFATE HEPTAHYDRATE 40 MG/ML
2 INJECTION, SOLUTION INTRAVENOUS
Status: DISCONTINUED | OUTPATIENT
Start: 2020-06-29 | End: 2020-06-30

## 2020-06-29 RX ORDER — CALCIUM GLUCONATE 94 MG/ML
1 INJECTION, SOLUTION INTRAVENOUS
Status: CANCELLED | OUTPATIENT
Start: 2020-06-29

## 2020-06-29 RX ORDER — CARBOPROST TROMETHAMINE 250 UG/ML
INJECTION, SOLUTION INTRAMUSCULAR PRN
Status: DISCONTINUED | OUTPATIENT
Start: 2020-06-29 | End: 2020-06-29

## 2020-06-29 RX ORDER — AMOXICILLIN 250 MG
1 CAPSULE ORAL 2 TIMES DAILY
Status: DISCONTINUED | OUTPATIENT
Start: 2020-06-29 | End: 2020-07-02 | Stop reason: HOSPADM

## 2020-06-29 RX ORDER — MAGNESIUM SULFATE HEPTAHYDRATE 500 MG/ML
4 INJECTION, SOLUTION INTRAMUSCULAR; INTRAVENOUS
Status: CANCELLED | OUTPATIENT
Start: 2020-06-29

## 2020-06-29 RX ORDER — ONDANSETRON 2 MG/ML
INJECTION INTRAMUSCULAR; INTRAVENOUS PRN
Status: DISCONTINUED | OUTPATIENT
Start: 2020-06-29 | End: 2020-06-29

## 2020-06-29 RX ORDER — LABETALOL HYDROCHLORIDE 5 MG/ML
INJECTION, SOLUTION INTRAVENOUS
Status: COMPLETED
Start: 2020-06-29 | End: 2020-06-29

## 2020-06-29 RX ORDER — NALOXONE HYDROCHLORIDE 0.4 MG/ML
.1-.4 INJECTION, SOLUTION INTRAMUSCULAR; INTRAVENOUS; SUBCUTANEOUS
Status: DISCONTINUED | OUTPATIENT
Start: 2020-06-29 | End: 2020-06-29

## 2020-06-29 RX ORDER — SODIUM CHLORIDE, SODIUM LACTATE, POTASSIUM CHLORIDE, CALCIUM CHLORIDE 600; 310; 30; 20 MG/100ML; MG/100ML; MG/100ML; MG/100ML
10-125 INJECTION, SOLUTION INTRAVENOUS CONTINUOUS
Status: CANCELLED | OUTPATIENT
Start: 2020-06-29

## 2020-06-29 RX ORDER — OXYTOCIN/0.9 % SODIUM CHLORIDE 30/500 ML
340 PLASTIC BAG, INJECTION (ML) INTRAVENOUS CONTINUOUS PRN
Status: DISCONTINUED | OUTPATIENT
Start: 2020-06-29 | End: 2020-07-02 | Stop reason: HOSPADM

## 2020-06-29 RX ORDER — LORAZEPAM 2 MG/ML
2 INJECTION INTRAMUSCULAR
Status: DISCONTINUED | OUTPATIENT
Start: 2020-06-29 | End: 2020-07-02 | Stop reason: HOSPADM

## 2020-06-29 RX ORDER — ACETAMINOPHEN 325 MG/1
975 TABLET ORAL ONCE
Status: COMPLETED | OUTPATIENT
Start: 2020-06-29 | End: 2020-06-29

## 2020-06-29 RX ORDER — FENTANYL CITRATE 50 UG/ML
INJECTION, SOLUTION INTRAMUSCULAR; INTRAVENOUS PRN
Status: DISCONTINUED | OUTPATIENT
Start: 2020-06-29 | End: 2020-06-29

## 2020-06-29 RX ORDER — SODIUM CHLORIDE, SODIUM LACTATE, POTASSIUM CHLORIDE, CALCIUM CHLORIDE 600; 310; 30; 20 MG/100ML; MG/100ML; MG/100ML; MG/100ML
INJECTION, SOLUTION INTRAVENOUS CONTINUOUS
Status: DISCONTINUED | OUTPATIENT
Start: 2020-06-29 | End: 2020-06-29 | Stop reason: CLARIF

## 2020-06-29 RX ORDER — OXYTOCIN/0.9 % SODIUM CHLORIDE 30/500 ML
100 PLASTIC BAG, INJECTION (ML) INTRAVENOUS CONTINUOUS
Status: DISCONTINUED | OUTPATIENT
Start: 2020-06-29 | End: 2020-07-02 | Stop reason: HOSPADM

## 2020-06-29 RX ADMIN — PHENYLEPHRINE HYDROCHLORIDE 100 MCG: 10 INJECTION INTRAVENOUS at 18:21

## 2020-06-29 RX ADMIN — Medication 5 MG: at 18:50

## 2020-06-29 RX ADMIN — Medication 10 MG: at 18:57

## 2020-06-29 RX ADMIN — SODIUM CHLORIDE, POTASSIUM CHLORIDE, SODIUM LACTATE AND CALCIUM CHLORIDE: 600; 310; 30; 20 INJECTION, SOLUTION INTRAVENOUS at 18:09

## 2020-06-29 RX ADMIN — Medication 100 ML/HR: at 19:27

## 2020-06-29 RX ADMIN — LABETALOL HYDROCHLORIDE 5 MG: 5 INJECTION, SOLUTION INTRAVENOUS at 17:48

## 2020-06-29 RX ADMIN — MAGNESIUM SULFATE IN WATER 2 G/HR: 40 INJECTION, SOLUTION INTRAVENOUS at 18:00

## 2020-06-29 RX ADMIN — CARBOPROST TROMETHAMINE 250 MCG: 250 INJECTION, SOLUTION INTRAMUSCULAR at 18:42

## 2020-06-29 RX ADMIN — Medication 500 ML/HR: at 18:35

## 2020-06-29 RX ADMIN — Medication 10 MG: at 18:23

## 2020-06-29 RX ADMIN — Medication 10 MG: at 18:21

## 2020-06-29 RX ADMIN — TRANEXAMIC ACID 1 G: 1 INJECTION, SOLUTION INTRAVENOUS at 18:35

## 2020-06-29 RX ADMIN — PHENYLEPHRINE HYDROCHLORIDE 100 MCG: 10 INJECTION INTRAVENOUS at 18:53

## 2020-06-29 RX ADMIN — MORPHINE SULFATE 0.15 MG: 1 INJECTION, SOLUTION EPIDURAL; INTRATHECAL; INTRAVENOUS at 18:18

## 2020-06-29 RX ADMIN — NIFEDIPINE 10 MG: 10 CAPSULE ORAL at 17:16

## 2020-06-29 RX ADMIN — PHENYLEPHRINE HYDROCHLORIDE 200 MCG: 10 INJECTION INTRAVENOUS at 18:51

## 2020-06-29 RX ADMIN — Medication 10 MG: at 18:53

## 2020-06-29 RX ADMIN — FENTANYL CITRATE 20 MCG: 50 INJECTION, SOLUTION INTRAMUSCULAR; INTRAVENOUS at 18:18

## 2020-06-29 RX ADMIN — SODIUM CITRATE AND CITRIC ACID MONOHYDRATE 30 ML: 500; 334 SOLUTION ORAL at 18:04

## 2020-06-29 RX ADMIN — CEFAZOLIN SODIUM 2 G: 2 INJECTION, SOLUTION INTRAVENOUS at 18:04

## 2020-06-29 RX ADMIN — ONDANSETRON 4 MG: 2 INJECTION INTRAMUSCULAR; INTRAVENOUS at 18:31

## 2020-06-29 RX ADMIN — LABETALOL HYDROCHLORIDE 20 MG: 5 INJECTION, SOLUTION INTRAVENOUS at 17:28

## 2020-06-29 RX ADMIN — PHENYLEPHRINE HYDROCHLORIDE 100 MCG: 10 INJECTION INTRAVENOUS at 18:49

## 2020-06-29 RX ADMIN — BUPIVACAINE HYDROCHLORIDE IN DEXTROSE 10.5 MG: 7.5 INJECTION, SOLUTION SUBARACHNOID at 18:18

## 2020-06-29 RX ADMIN — KETOROLAC TROMETHAMINE 15 MG: 30 INJECTION, SOLUTION INTRAMUSCULAR at 23:45

## 2020-06-29 RX ADMIN — MAGNESIUM SULFATE HEPTAHYDRATE 4 G: 40 INJECTION, SOLUTION INTRAVENOUS at 17:29

## 2020-06-29 RX ADMIN — ACETAMINOPHEN 975 MG: 325 TABLET, FILM COATED ORAL at 18:03

## 2020-06-29 RX ADMIN — PHENYLEPHRINE HYDROCHLORIDE 0.5 MCG/KG/MIN: 10 INJECTION INTRAVENOUS at 18:18

## 2020-06-29 ASSESSMENT — MIFFLIN-ST. JEOR: SCORE: 1618.22

## 2020-06-29 NOTE — OP NOTE
Section Operative Note    Cristin Huerta  2020    Pre-operative Diagnosis:    at 36w5d   Di di twin pregnancy  Pre-eclampsia with severe features  Breech/cephalic  Had cerclage during pregnancy  IVF pregnancy      Post-operative Diagnosis: Same    Procedure done: Primary LTCS    Surgeon: Riana Jang MD      Anesthesia: combined spinal-epidural    Complications:  None    QBL: 540 ml      IV fluids- see anesthesia record    Drains- rasmussen catheter    Findings:  Infant A, breech  Weight 4 lbs 9 oz  APGARS- 8/9    Infant B, cephalic  Weight 4 lbs 3 oz  3-V cord  Normal tubes and ovaries.    Indications:   Patient is a 39 year old  , who was admitted at 36w5d weeks pregnancy for pre-eclampsia with severe features.   When in triage she needed IV  blood pressure medication, receiving Labetalol.   Magnesium was started.  r/b/a were discussed and all questions were answered.     Procedure Details:  IV antibiotics given per protocol.  SCD placed for VTE prophylaxis.  Spinal anesthesia administered, checked and found to be adequate.  Rasmussen catheter was in place.  The patient was draped and prepped in the usual sterile manner in the dorsal postion with a left tilt. A Pfannenstiel incision was made and carried down through the subcutaneous tissue to the fascia. Fascial incision was made and extended transversely.The fascia was  from the underlying rectus tissue superiorly and inferiorly. The peritoneum was identified and entered bluntly. Peritoneal incision was extended with careful visualization of bladder.  The bladder blade was inserted.   The utero-vesical peritoneal reflection was opened sharply and extended latteraly.  The bladder blade was then replaced.     Transverse incision made in the lower uterine segment above the bladder.  Infant A was delivered from the breech presentation. Nose and mouth suctioned at the abdominal wall. Rest of the infant delivered without complications.  After the umbilical cord was clamped and cut cord blood sampled.  Infant B delivered cephalic presentation.  The placenta was manually removed with membranes.     The uterus was relatively atonic.  She was given pitocin, TXA and one dose of hemabate.   Tubes and ovaries appeared normal.   The uterine incision was closed with running locked sutures of 0 Vicryl.   Second layer of sutures used to imbricate the initial layer.  Hemostasis was observed. Uterus returned to the abdomen.  Copious irrigation and suctioning of the peritoneal cavity was carried out. Para-colic gutters were cleared of all clots and debris.  Hysterotomy once again checked to ascertain hemostasis.  The fascia was closed with running sutures of 0 Vicryl.   Subcutaneous tissue re approximated with 3-0 vicryl  The skin was closed in a subcuticular fashion with 4-0 monocryl and dermabond.  Instrument, sponge, and needle counts were correct x 3   NNP present for delivery    Prior to the operating room she received Procardia 10 mg, prior to IV placement and Labetalol 20 mg IV.  She was started on magnesium sulfate.

## 2020-06-29 NOTE — ANESTHESIA PROCEDURE NOTES
Procedure note : intrathecal  Staff -   Anesthesiologist:  Ivan Santiago MD      Performed By: Anesthesiologist        Pre-Procedure  Performed by Ivan Santiago MD  Location: OR      Pre-Anesthestic Checklist: patient identified, IV checked, risks and benefits discussed, informed consent, monitors and equipment checked, pre-op evaluation and at physician/surgeon's request    Timeout  Correct Patient: Yes   Correct Procedure: Yes   Correct Site: Yes   Correct Laterality: N/A   Correct Position: Yes   Site Marked: N/A   .   Procedure Documentation    .    Procedure: intrathecal, .   Patient Position:sitting Insertion Site:L3-4  (midline approach)     Patient Prep/Sterile Barriers; mask, sterile gloves, chlorhexidine gluconate and isopropyl alcohol, patient draped.  .  Needle:  Spinal Needle (gauge): 25  Spinal/LP Needle Length (inches): 3.5 # of attempts: 1 and # of redirects:  Introducer used Introducer: 20 G .        Assessment/Narrative  Paresthesias: No.  .  .  clear CSF fluid removed . Comments:  Duramorph and fentanyl dosed along with bupivacaine.  T4 sensory level confirmed bilaterally prior to incision.   The patient was prepped and draped in a sterile fashion.  Topical anesthesia was injected subcutaneously using 1% lidocaine.  A 25G Manas needle was advanced via a 20 G introducer at the the L3-4 level.  Clear CSF obtained, with birefringence on aspiration.  CSF freely aspirated after injection of 10.5 mg bupivacaine.  No paresthesias reported by patient, who tolerated the procedure well.

## 2020-06-29 NOTE — ANESTHESIA PREPROCEDURE EVALUATION
Anesthesia Pre-Procedure Evaluation    Patient: Cristin Huerta   MRN: [id number] : 1980          Preoperative Diagnosis: Twin pregnancy, antepartum [O30.009]    Procedure(s):  PRIMARY  SECTION    Past Medical History:   Diagnosis Date     Depression      Past Surgical History:   Procedure Laterality Date     CERCLAGE CERVICAL N/A 3/5/2020    Procedure: CERCLAGE, CERVIX, VAGINAL APPROACH;  Surgeon: Brady Trujillo MD;  Location: [facility]     GYN SURGERY      colposcopy, LEEP, R tubal removal, D and C     HEAD & NECK SURGERY      jaw surgery     ORTHOPEDIC SURGERY      wrist       Anesthesia Evaluation     .             ROS/MED HX    ENT/Pulmonary:      (-) asthma and sleep apnea   Neurologic:       Cardiovascular: Comment: Pre-eclampsia    (+) hypertension----. : . . . :. .       METS/Exercise Tolerance:     Hematologic:         Musculoskeletal:         GI/Hepatic:         Renal/Genitourinary:         Endo:     (+) type II DM .      Psychiatric:     (+) psychiatric history depression      Infectious Disease:         Malignancy:         Other:                          Physical Exam  Normal systems: dental    Airway   Mallampati: II  TM distance: >3 FB  Neck ROM: full    Dental     Cardiovascular   Rhythm and rate: regular      Pulmonary    breath sounds clear to auscultation            Lab Results   Component Value Date    WBC 9.6 2020    HGB 11.0 (L) 2020    HCT 32.4 (L) 2020     2020     2020    POTASSIUM 4.0 2020    CHLORIDE 111 (H) 2020    CO2 19 (L) 2020    BUN 14 2020    CR 0.58 2020    GLC 92 2020    INÉS 9.2 2020    ALBUMIN 2.5 (L) 2020    PROTTOTAL 6.2 (L) 2020    ALT 19 2020    AST 18 2020    ALKPHOS 173 (H) 2020    BILITOTAL 0.3 2020       Preop Vitals  BP Readings from Last 3 Encounters:   20 136/64   20 122/76   20 (!) 140/76    Pulse Readings from Last 3  "Encounters:   03/05/20 79   01/12/20 84   12/20/19 74      Resp Readings from Last 3 Encounters:   06/29/20 16   03/06/20 18   01/12/20 18    SpO2 Readings from Last 3 Encounters:   03/05/20 99%   01/12/20 100%      Temp Readings from Last 1 Encounters:    Ht Readings from Last 1 Encounters:   06/29/20 1.715 m (5' 7.5\")      Wt Readings from Last 1 Encounters:   06/29/20 90.3 kg (199 lb)    Estimated body mass index is 30.71 kg/m  as calculated from the following:    Height as of this encounter: 1.715 m (5' 7.5\").    Weight as of this encounter: 90.3 kg (199 lb).       Anesthesia Plan      History & Physical Review  History and physical reviewed and following examination; no interval change.    ASA Status:  3 .    NPO Status:  > 4 hours    Plan for Spinal   PONV prophylaxis:  Ondansetron (or other 5HT-3)  Duramorph and fentanyl for spinal        Postoperative Care  Postoperative pain management:  Neuraxial analgesia.      Consents  Anesthetic plan, risks, benefits and alternatives discussed with:  Patient..                 Ivan Santiago MD  "

## 2020-06-30 LAB
ALBUMIN SERPL-MCNC: 2.4 G/DL (ref 3.4–5)
ALP SERPL-CCNC: 177 U/L (ref 40–150)
ALT SERPL W P-5'-P-CCNC: 18 U/L (ref 0–50)
ANION GAP SERPL CALCULATED.3IONS-SCNC: 8 MMOL/L (ref 3–14)
AST SERPL W P-5'-P-CCNC: 19 U/L (ref 0–45)
BILIRUB SERPL-MCNC: 0.3 MG/DL (ref 0.2–1.3)
BUN SERPL-MCNC: 8 MG/DL (ref 7–30)
CALCIUM SERPL-MCNC: 7.9 MG/DL (ref 8.5–10.1)
CHLORIDE SERPL-SCNC: 105 MMOL/L (ref 94–109)
CO2 SERPL-SCNC: 21 MMOL/L (ref 20–32)
CREAT SERPL-MCNC: 0.57 MG/DL (ref 0.52–1.04)
ERYTHROCYTE [DISTWIDTH] IN BLOOD BY AUTOMATED COUNT: 13.2 % (ref 10–15)
GFR SERPL CREATININE-BSD FRML MDRD: >90 ML/MIN/{1.73_M2}
GLUCOSE SERPL-MCNC: 116 MG/DL (ref 70–99)
HCT VFR BLD AUTO: 30.9 % (ref 35–47)
HGB BLD-MCNC: 10.6 G/DL (ref 11.7–15.7)
MAGNESIUM SERPL-MCNC: 6 MG/DL (ref 1.6–2.3)
MCH RBC QN AUTO: 30.8 PG (ref 26.5–33)
MCHC RBC AUTO-ENTMCNC: 34.3 G/DL (ref 31.5–36.5)
MCV RBC AUTO: 90 FL (ref 78–100)
PLATELET # BLD AUTO: 194 10E9/L (ref 150–450)
POTASSIUM SERPL-SCNC: 4 MMOL/L (ref 3.4–5.3)
PROT SERPL-MCNC: 6.1 G/DL (ref 6.8–8.8)
RBC # BLD AUTO: 3.44 10E12/L (ref 3.8–5.2)
SODIUM SERPL-SCNC: 134 MMOL/L (ref 133–144)
T PALLIDUM AB SER QL: NONREACTIVE
WBC # BLD AUTO: 13.2 10E9/L (ref 4–11)

## 2020-06-30 PROCEDURE — 83735 ASSAY OF MAGNESIUM: CPT | Performed by: OBSTETRICS & GYNECOLOGY

## 2020-06-30 PROCEDURE — 25000128 H RX IP 250 OP 636: Performed by: OBSTETRICS & GYNECOLOGY

## 2020-06-30 PROCEDURE — 25000132 ZZH RX MED GY IP 250 OP 250 PS 637: Performed by: OBSTETRICS & GYNECOLOGY

## 2020-06-30 PROCEDURE — 85027 COMPLETE CBC AUTOMATED: CPT | Performed by: OBSTETRICS & GYNECOLOGY

## 2020-06-30 PROCEDURE — 12000035 ZZH R&B POSTPARTUM

## 2020-06-30 PROCEDURE — 36415 COLL VENOUS BLD VENIPUNCTURE: CPT | Performed by: OBSTETRICS & GYNECOLOGY

## 2020-06-30 PROCEDURE — 80053 COMPREHEN METABOLIC PANEL: CPT | Performed by: OBSTETRICS & GYNECOLOGY

## 2020-06-30 PROCEDURE — 25800030 ZZH RX IP 258 OP 636: Performed by: OBSTETRICS & GYNECOLOGY

## 2020-06-30 RX ORDER — IBUPROFEN 800 MG/1
800 TABLET, FILM COATED ORAL EVERY 6 HOURS
Qty: 30 TABLET | Refills: 0 | Status: SHIPPED | OUTPATIENT
Start: 2020-06-30 | End: 2020-07-14

## 2020-06-30 RX ORDER — OXYCODONE HYDROCHLORIDE 5 MG/1
5 TABLET ORAL EVERY 4 HOURS PRN
Qty: 18 TABLET | Refills: 0 | Status: SHIPPED | OUTPATIENT
Start: 2020-06-30 | End: 2020-07-14

## 2020-06-30 RX ADMIN — SODIUM CHLORIDE, POTASSIUM CHLORIDE, SODIUM LACTATE AND CALCIUM CHLORIDE 75 ML/HR: 600; 310; 30; 20 INJECTION, SOLUTION INTRAVENOUS at 00:28

## 2020-06-30 RX ADMIN — SODIUM CHLORIDE, POTASSIUM CHLORIDE, SODIUM LACTATE AND CALCIUM CHLORIDE 87.5 ML/HR: 600; 310; 30; 20 INJECTION, SOLUTION INTRAVENOUS at 12:18

## 2020-06-30 RX ADMIN — ACETAMINOPHEN 975 MG: 325 TABLET, FILM COATED ORAL at 00:26

## 2020-06-30 RX ADMIN — IBUPROFEN 800 MG: 400 TABLET, FILM COATED ORAL at 18:32

## 2020-06-30 RX ADMIN — ACETAMINOPHEN 975 MG: 325 TABLET, FILM COATED ORAL at 06:30

## 2020-06-30 RX ADMIN — DOCUSATE SODIUM AND SENNOSIDES 1 TABLET: 8.6; 5 TABLET, FILM COATED ORAL at 08:17

## 2020-06-30 RX ADMIN — SIMETHICONE CHEW TAB 80 MG 80 MG: 80 TABLET ORAL at 16:28

## 2020-06-30 RX ADMIN — ACETAMINOPHEN 975 MG: 325 TABLET, FILM COATED ORAL at 12:17

## 2020-06-30 RX ADMIN — SIMETHICONE CHEW TAB 80 MG 80 MG: 80 TABLET ORAL at 10:40

## 2020-06-30 RX ADMIN — KETOROLAC TROMETHAMINE 15 MG: 30 INJECTION, SOLUTION INTRAMUSCULAR at 06:28

## 2020-06-30 RX ADMIN — MAGNESIUM SULFATE IN WATER 2 G/HR: 40 INJECTION, SOLUTION INTRAVENOUS at 03:29

## 2020-06-30 RX ADMIN — DOCUSATE SODIUM AND SENNOSIDES 1 TABLET: 8.6; 5 TABLET, FILM COATED ORAL at 20:07

## 2020-06-30 RX ADMIN — KETOROLAC TROMETHAMINE 15 MG: 30 INJECTION, SOLUTION INTRAMUSCULAR at 12:17

## 2020-06-30 RX ADMIN — MAGNESIUM SULFATE IN WATER 1.5 G/HR: 40 INJECTION, SOLUTION INTRAVENOUS at 15:36

## 2020-06-30 RX ADMIN — ACETAMINOPHEN 975 MG: 325 TABLET, FILM COATED ORAL at 18:32

## 2020-06-30 ASSESSMENT — MIFFLIN-ST. JEOR: SCORE: 1566.51

## 2020-06-30 NOTE — ANESTHESIA CARE TRANSFER NOTE
Patient: Cristin Huerta    Procedure(s):  PRIMARY  SECTION    Diagnosis: Twin pregnancy, antepartum [O30.009]  Diagnosis Additional Information: No value filed.    Anesthesia Type:   Spinal     Note:  Airway :Room Air  Patient transferred to:PACU  Comments: Transferred to OB PACU recovery, spontaneous respirations on room air with oxygen saturations maintained greater than 95%. SpO2, NiBP, and EKG monitors and alarms on and functioning, report on patient's clinical status given to OB recovery RN, RN questions answered, patient in hospital bed with siderails up Oxytocin 30 units in 500mL infusion connected to IV infusion pump in recovery bay and programmed to 100 mL/hr at handoff of care.  Handoff Report: Identifed the Patient, Identified the Reponsible Provider, Reviewed the pertinent medical history, Discussed the surgical course, Reviewed Intra-OP anesthesia mangement and issues during anesthesia, Set expectations for post-procedure period and Allowed opportunity for questions and acknowledgement of understanding      Vitals: (Last set prior to Anesthesia Care Transfer)    CRNA VITALS  2020 1840 - 2020 1916      2020             NIBP:  122/66    NIBP Mean:  81    Resp Rate (set):  10                Electronically Signed By: SALTY Rodrigues CRNA  2020  7:16 PM

## 2020-06-30 NOTE — PLAN OF CARE
1433-[place] [facility] [place] [facility] [place].  Due to technical difficulties pt was unable to complete BPP at [facility] and orders were sent to complete here.  She is a G1 at 36+5 with twins IVF.  It was recently discovered twin A has PACs.  Planned c/s for Thursday.  1442-Pt placed on EFM per consent and triage assessment completed.  Twin B is tracing well on EFM but due to the arrhythmia twin A is only audible in 140's intermittently and cannot complete an accurate FHT.   [facility] notified pt has arrived and will need to come for bedside U/S.  1600-MFM at bedside.  Dr. Trujillo came to consult with pt and review results of U/S, no risk to fetuses at this point.  Pt has also been closely monitored for preeclampsia.  Preeclampsia labs collected and serial BPs done.  1653-Dr. Jang notified of elevated BP orders to monitor 1hr further.  1708- Continued severe range BPs notified Suhail.  Plan to admit for c/s and begin hypertensive crisis protocol.  Pt informed of POC, agreeable to plan.  Dr. Jang on her way to hospital to assess.

## 2020-06-30 NOTE — PLAN OF CARE
"BP remains 143-129/97-80\"s other vitals stable denies headache blurred vision epigastric pain generalized edema +2 reflex no clonus pain controlled with tylenol and Toradol  15 mg denies narcotics  FF firm @U/U scent flow incision clean dry and intact c/o itching denied meds On magnesium 2 gm/hr 50 ml/hr and LR 75 ml/hr  rasmussen draining good amount up with assist to bed side antonio care done ambulated  had emesis 400 ml state feel better after throwing up went to NICU see ezequiel Zabala Working on breast feeding and bottle feeding pumping  Patient  passing gas Patient and infant bonding well. Will continue with current plan of care.     "

## 2020-06-30 NOTE — ANESTHESIA POSTPROCEDURE EVALUATION
Patient: Cristin Huerta    Procedure(s):  PRIMARY  SECTION    Diagnosis:Twin pregnancy, antepartum [O30.009]  Diagnosis Additional Information: No value filed.    Anesthesia Type:  Spinal    Note:  Anesthesia Post Evaluation    Patient location during evaluation: OB PACU  Patient participation: Able to fully participate in evaluation  Level of consciousness: awake and alert  Pain management: satisfactory to patient  Airway patency: patent  Cardiovascular status: hemodynamically stable  Respiratory status: acceptable and unassisted  Hydration status: balanced  PONV: none     Anesthetic complications: None          Last vitals:  Vitals:    20   BP: (!) 147/95 (!) 142/95 (!) 148/101   Pulse: 82 84 93   Resp: 20 20 14   Temp:      SpO2:  94%          Electronically Signed By: Vance Wang MD  2020  8:37 PM

## 2020-06-30 NOTE — PROGRESS NOTES
"Cristin Huerta  June 30, 2020    S: pt is doing well.  Tolerating po intake and pain is well controlled.      O:/72   Pulse 82   Temp 98.3  F (36.8  C) (Oral)   Resp 16   Ht 1.715 m (5' 7.5\")   Wt 85.1 kg (187 lb 9.6 oz)   SpO2 100%   Breastfeeding Unknown   BMI 28.95 kg/m    Recent Labs   Lab 06/30/20  0639 06/29/20  1513 06/25/20  1411   HGB 10.6* 11.0* 11.3*     Abdomen: soft, non distended, fundus firm below the umbilicus.  Inc- with bandage intact  Ext: non tender, no edema or erythema    A/P: s/p LTCS POD #1, di di twins,  pre-eclampsia with severe features  1. Stop the magnesium tonight at 7:00 pm  2. Will continue to watch pressures, last couple have been normal, not on medication at this time.    Riana Jang MD  "

## 2020-06-30 NOTE — LACTATION NOTE
"Initial visit with Cristin and baby Eva (twin A). Dad was visiting baby Sagrario (twin B) in NICU at time of visit. Eva just had her blood sugar checked (in the 70's!) and LC helped Eva latch to Cristin's breast in football hold on L side. Eva rooting and eager to latch. Wide latch with nutritive suck pattern observed. Demonstrated, educated to how to sandwich breast to help infant latch. Eva nursed with nutritive bursts of suckling for 15 minutes and when infant unlatched, Cristin's nipple did appear \"pinched\". LC suggested introducing a nipple shieldDiscussed realistic capabilities of twins' breast feeding ability due to their size (both AGA) and age (LPT). Suggested limiting time at breast to 15 minutes, followed by supplementation (currently bottling formula via bottle to supplement). Limiting time at breast based on infant stamina and maintenance of stable blood sugars. LC suggested introducing a nipple shield with next breast feed to help infant maintain wider latch. Also discussed nipple shields can help LPT infants' to transfer more milk at the breast.    Offered to attend next feeding to introduce nipple shield. Marlene Zabala will hopefully be transferred up to [facility] in time for next feeding.    Demonstrated bottle feeding techniques to Cristin for Eva's bottle supplement. Including initially allow bottle nipple to stimulate roof of Eva's mouth to initiate sucking reflex prior to tipping milk into the nipple and how to pace feed. Eva drank 7ml from bottle and tolerated well. Discussed/demonstrated burping technique.    Cristin shares she is planning on a \"hybrid\" of breast feeding and bottle feeding. We discussed how to pump now for stimulation and to help bring milk in as quickly as possible and then how to pump for their on-going feeding plan. Breastfeeding general information reviewed. Reviewed breastfeeding section in [facility] patient education booklet.      Feeding plan: Recommend unlimited " and frequent breast feedings (reviewed early feeding cues): At least 8 - 12 times every 24 hours. Currently breastfeeding limited to 15 minutes and then supplementing formula with a bottle. Cristin pumping. Instructed in hand expression. Explained benefits of holding baby skin on skin to help promote better breastfeeding outcomes.    Will follow as needed.    Addedum: Assisted with 1100 feeding. Baby Sagrario up from NICU, so this is Cristin's first time attempting tandem feeding. With assistance, Cristin able to get both infant's latched with nipple shields in football hold. Twins each had bursts of suckling and colostrum witnessed in nipple shields. Both twins supplemented with formula via bottle after 15 minutes of breast feeding. Eva-17ml, Sagrario-20ml. Offered support and encouragement for such a great feeding session. Cristin reports she felt a little overwhelmed but happy to have had her first tandem session.      Michelle Horta RN, Lactation Educator

## 2020-06-30 NOTE — PLAN OF CARE
Vital signs stable, but blood pressure still slightly elevated. Incision dressing clean, dry, and intact. Dillard discontinued at 1600, due to void. Lung sounds clear and equal. +2 reflexes in lower extremities. No clonus noted. Denies headache, blurred vision, or epigastric pain. Using tylenol/ibuprofen for pain management as well as ice on her incision. Simethicone and warm packs given for gas pain. Up ambulating free of dizziness. Working on breastfeeding every 2-3 hours. Pumping after each feeding. Questions/concerns addressed.

## 2020-06-30 NOTE — PROVIDER NOTIFICATION
Provider verbally requested mag level to be dropped down to 1.5g/hr this morning while at bedside (0750). Realized that the order was placed incorrectly in the computer. Dr. Jang called at 0900 just to again verify she meant it be lowered to 1.5g/hr which she did. Correction made in the MAR.   PA to assess pain unchanged by two tablets of percocet. Hydralazine IVP apply dressing, notify team, manage pain

## 2020-06-30 NOTE — PLAN OF CARE
Data: Cristin Huerta transferred to 404 via wheelchair at 2145. Baby transferred via parent's arms.  Action: Receiving unit notified of transfer: Yes. Patient and family notified of room change. Report given to Amber Mcintyre RN at 2150. Belongings sent to receiving unit. Accompanied by Registered Nurse. Oriented patient to surroundings. Call light within reach. ID bands double-checked with receiving RN.  Response: Patient tolerated transfer and is stable.

## 2020-07-01 LAB
ALBUMIN SERPL-MCNC: 2.2 G/DL (ref 3.4–5)
ALP SERPL-CCNC: 137 U/L (ref 40–150)
ALT SERPL W P-5'-P-CCNC: 15 U/L (ref 0–50)
ANION GAP SERPL CALCULATED.3IONS-SCNC: 8 MMOL/L (ref 3–14)
AST SERPL W P-5'-P-CCNC: 18 U/L (ref 0–45)
BILIRUB SERPL-MCNC: 0.5 MG/DL (ref 0.2–1.3)
BUN SERPL-MCNC: 12 MG/DL (ref 7–30)
CALCIUM SERPL-MCNC: 8.2 MG/DL (ref 8.5–10.1)
CHLORIDE SERPL-SCNC: 107 MMOL/L (ref 94–109)
CO2 SERPL-SCNC: 22 MMOL/L (ref 20–32)
COPATH REPORT: NORMAL
CREAT SERPL-MCNC: 0.7 MG/DL (ref 0.52–1.04)
GFR SERPL CREATININE-BSD FRML MDRD: >90 ML/MIN/{1.73_M2}
GLUCOSE SERPL-MCNC: 84 MG/DL (ref 70–99)
POTASSIUM SERPL-SCNC: 3.9 MMOL/L (ref 3.4–5.3)
PROT SERPL-MCNC: 5.6 G/DL (ref 6.8–8.8)
SODIUM SERPL-SCNC: 137 MMOL/L (ref 133–144)

## 2020-07-01 PROCEDURE — 25000132 ZZH RX MED GY IP 250 OP 250 PS 637: Performed by: OBSTETRICS & GYNECOLOGY

## 2020-07-01 PROCEDURE — 36415 COLL VENOUS BLD VENIPUNCTURE: CPT | Performed by: OBSTETRICS & GYNECOLOGY

## 2020-07-01 PROCEDURE — 12000035 ZZH R&B POSTPARTUM

## 2020-07-01 PROCEDURE — 80053 COMPREHEN METABOLIC PANEL: CPT | Performed by: OBSTETRICS & GYNECOLOGY

## 2020-07-01 RX ORDER — POLYETHYLENE GLYCOL 3350 17 G/17G
17 POWDER, FOR SOLUTION ORAL DAILY
Status: DISCONTINUED | OUTPATIENT
Start: 2020-07-01 | End: 2020-07-02 | Stop reason: HOSPADM

## 2020-07-01 RX ADMIN — ACETAMINOPHEN 975 MG: 325 TABLET, FILM COATED ORAL at 12:02

## 2020-07-01 RX ADMIN — OXYCODONE HYDROCHLORIDE 5 MG: 5 TABLET ORAL at 12:06

## 2020-07-01 RX ADMIN — DOCUSATE SODIUM AND SENNOSIDES 1 TABLET: 8.6; 5 TABLET, FILM COATED ORAL at 19:38

## 2020-07-01 RX ADMIN — DOCUSATE SODIUM AND SENNOSIDES 1 TABLET: 8.6; 5 TABLET, FILM COATED ORAL at 08:00

## 2020-07-01 RX ADMIN — ACETAMINOPHEN 975 MG: 325 TABLET, FILM COATED ORAL at 23:57

## 2020-07-01 RX ADMIN — SIMETHICONE CHEW TAB 80 MG 80 MG: 80 TABLET ORAL at 23:58

## 2020-07-01 RX ADMIN — IBUPROFEN 800 MG: 400 TABLET, FILM COATED ORAL at 00:16

## 2020-07-01 RX ADMIN — POLYETHYLENE GLYCOL 3350 17 G: 17 POWDER, FOR SOLUTION ORAL at 09:45

## 2020-07-01 RX ADMIN — OXYCODONE HYDROCHLORIDE 5 MG: 5 TABLET ORAL at 00:47

## 2020-07-01 RX ADMIN — ACETAMINOPHEN 975 MG: 325 TABLET, FILM COATED ORAL at 00:17

## 2020-07-01 RX ADMIN — IBUPROFEN 800 MG: 400 TABLET, FILM COATED ORAL at 23:58

## 2020-07-01 RX ADMIN — SIMETHICONE CHEW TAB 80 MG 80 MG: 80 TABLET ORAL at 00:17

## 2020-07-01 RX ADMIN — SIMETHICONE CHEW TAB 80 MG 80 MG: 80 TABLET ORAL at 17:59

## 2020-07-01 RX ADMIN — IBUPROFEN 800 MG: 400 TABLET, FILM COATED ORAL at 17:59

## 2020-07-01 RX ADMIN — OXYCODONE HYDROCHLORIDE 5 MG: 5 TABLET ORAL at 22:58

## 2020-07-01 RX ADMIN — ACETAMINOPHEN 975 MG: 325 TABLET, FILM COATED ORAL at 17:59

## 2020-07-01 RX ADMIN — IBUPROFEN 800 MG: 400 TABLET, FILM COATED ORAL at 12:03

## 2020-07-01 RX ADMIN — SIMETHICONE CHEW TAB 80 MG 80 MG: 80 TABLET ORAL at 10:54

## 2020-07-01 RX ADMIN — IBUPROFEN 800 MG: 400 TABLET, FILM COATED ORAL at 06:05

## 2020-07-01 RX ADMIN — ACETAMINOPHEN 975 MG: 325 TABLET, FILM COATED ORAL at 06:05

## 2020-07-01 NOTE — PLAN OF CARE
Vital signs stable. Blood pressure 141/87, 145/89 and 134/81. Denies headache, blurred vision, and dizziness.+2 reflexes, no clonus. Up independently. Voiding without difficulty. Lung sounds clear and equal. Taking tylenol, ibuprofen and oxycodone for pain management. Working on breastfeeding every 2-3 hours. Pumping after feedings. Encouraged to call with questions/concerns. Will continue to monitor.

## 2020-07-01 NOTE — LACTATION NOTE
Routine visit. With Cristin, FOB and baby.  Baby A Eva latch on well to the left breast.  Baby B Sagrario latched onto the right breast. Both babies able to preform nutritive suckling pattern.  Babies tandem feeding.  Then babies bottle fed with Similac by FOB and mother then pumping after nursing.    Breastfeeding general information reviewed.   Advised to breastfeed on demand 8-12x/day or sooner if baby cues.  Continues to nurse well per mom. No further questions at this time.   Will follow as needed.   Tootie Varghese BSN, RN, PHN, RNC-MNN, IBCLC

## 2020-07-01 NOTE — PLAN OF CARE
Pt's pain controlled with Ibuprofen/Tylenol/ and Oxycodone occasionally. VSS. Up and about in room and walks in the hallways. Denies headache/blurred vision/and epigastric pain. Voiding good amounts. Abdominal binder on. Will continue to monitor.

## 2020-07-01 NOTE — PLAN OF CARE
/88, 141/83. Other VSS on room air. Reflexes WNL, no mag toxicity s/sx. Incision open to air, liquid bandage intact,  no drainage. Fundus firm, midline, U/2. Bleeding scant. Up ad vadim, walking in halls. Voiding without issues, had BM this shift. Tandem feeding twins, going well. Supplementing via bottle w/ neosure formula and pumping after breastfeeding sessions. Regular diet, no nausea. Encouraged to call with questions/needs/concerns. Continue to monitor.

## 2020-07-01 NOTE — PROGRESS NOTES
"Cristin Huerta  July 1, 2020    S: pt is doing well.  Tolerating po intake and pain is well controlled.  Ambulating.      O:/73   Pulse 82   Temp 98  F (36.7  C) (Oral)   Resp 16   Ht 1.715 m (5' 7.5\")   Wt 85.1 kg (187 lb 9.6 oz)   SpO2 99%   Breastfeeding Unknown   BMI 28.95 kg/m    Recent Labs   Lab 06/30/20  0639 06/29/20  1513 06/25/20  1411   HGB 10.6* 11.0* 11.3*     Abdomen: soft, non distended, fundus firm below the umbilicus.  Incision is C/D/I  Ext: non tender, no edema or erythema    A/P: s/p LTCS POD #2  Doing well  Continue care  Discharge planning for tomorrow or friday    Riana Jang MD  "

## 2020-07-02 VITALS
RESPIRATION RATE: 16 BRPM | BODY MASS INDEX: 28.53 KG/M2 | DIASTOLIC BLOOD PRESSURE: 72 MMHG | SYSTOLIC BLOOD PRESSURE: 129 MMHG | OXYGEN SATURATION: 99 % | TEMPERATURE: 97.5 F | WEIGHT: 188.27 LBS | HEIGHT: 68 IN | HEART RATE: 82 BPM

## 2020-07-02 PROCEDURE — 25000132 ZZH RX MED GY IP 250 OP 250 PS 637: Performed by: OBSTETRICS & GYNECOLOGY

## 2020-07-02 RX ORDER — LABETALOL 100 MG/1
100 TABLET, FILM COATED ORAL 2 TIMES DAILY
Qty: 90 TABLET | Refills: 1 | Status: SHIPPED | OUTPATIENT
Start: 2020-07-02 | End: 2020-08-11

## 2020-07-02 RX ORDER — LABETALOL 100 MG/1
100 TABLET, FILM COATED ORAL EVERY 12 HOURS SCHEDULED
Status: DISCONTINUED | OUTPATIENT
Start: 2020-07-02 | End: 2020-07-02 | Stop reason: HOSPADM

## 2020-07-02 RX ADMIN — POLYETHYLENE GLYCOL 3350 17 G: 17 POWDER, FOR SOLUTION ORAL at 07:47

## 2020-07-02 RX ADMIN — ACETAMINOPHEN 975 MG: 325 TABLET, FILM COATED ORAL at 06:50

## 2020-07-02 RX ADMIN — DOCUSATE SODIUM AND SENNOSIDES 1 TABLET: 8.6; 5 TABLET, FILM COATED ORAL at 07:47

## 2020-07-02 RX ADMIN — LABETALOL HYDROCHLORIDE 100 MG: 100 TABLET, FILM COATED ORAL at 08:24

## 2020-07-02 RX ADMIN — IBUPROFEN 800 MG: 400 TABLET, FILM COATED ORAL at 06:50

## 2020-07-02 RX ADMIN — OXYCODONE HYDROCHLORIDE 5 MG: 5 TABLET ORAL at 04:38

## 2020-07-02 ASSESSMENT — MIFFLIN-ST. JEOR: SCORE: 1569.56

## 2020-07-02 NOTE — PLAN OF CARE
Pt's pain controlled with Oxycodone/Ibuprofen/Tylenol. Up and about in room and walks in the hallways. Abdominal binder on when up walking. Pumping after feedings. Going home today with newborns.

## 2020-07-02 NOTE — LACTATION NOTE
Routine visit with Cristin, Fidel and babies.  Tandem breastfeeding for 10 minutes then Fidel is bottle feeding 22Kcal Similac via bottle.  Getting ready for discharge.  Plan: Watch for feeding cues and feed every 2-3 hours and/or on demand. Continue to use feeding log to track intake and appropriate voids and stools. Take feeding log to first follow up appointment or weight check. Encourage skin to skin to promote frequent feedings, thermoregulation and bonding. Follow-up with healthcare provider or lactation consultant for questions or concerns.     Instructed on signs/symptoms of engorgement/ plugged ducts and mastitis.  Instructed on comfort measures and when to call MD.  Have an appointment for tomorrow AM with LC.      Continues to nurse well per mom. No further questions at this time.   Will follow as needed.   Tootie Varghese BSN, RN, PHN, RNC-MNN, IBCLC

## 2020-07-02 NOTE — LACTATION NOTE
Routine visit: Pt states breastfeeding is going well. Twins going to breast for approx 10-15 minutes each feeding. Tandem feeding. Supplementing with 15mls Neosure formula via bottle after feedings. Cristin is pumping and getting drops after each feed. Denies need for latch check at this time. Will continue to monitor and follow as needed.  FAISAL Son RN, BSN, PHN, IBCLC

## 2020-07-02 NOTE — PROGRESS NOTES
"Cristin Huerta  July 2, 2020    S: pt is doing well.  Tolerating po intake and pain is well controlled.  Ambulating.  Decreasing lochia.     O:BP (!) 143/90   Pulse 82   Temp 98  F (36.7  C) (Oral)   Resp 16   Ht 1.715 m (5' 7.5\")   Wt 85.4 kg (188 lb 4.4 oz)   SpO2 99%   Breastfeeding Unknown   BMI 29.05 kg/m    Recent Labs   Lab 06/30/20  0639 06/29/20  1513 06/25/20  1411   HGB 10.6* 11.0* 11.3*     Abdomen: soft, non distended, fundus firm below the umbilicus.  Incision is C/D/I  Ext: non tender, no edema or erythema    A/P: s/p LTCS POD #3  Doing well  Continue care  Discharge planning for today  Continue to check bps at home  Labetalol 100 mg BID  bp check mon-wed next week some time    Riana Jang MD  "

## 2020-07-03 NOTE — DISCHARGE SUMMARY
Cristin Huerta  1980     Cristin Huerta   Home Medication Instructions ADRIANNE:[id number]    Printed on:20 1002   Medication Information                      aspirin (ASA) 81 MG chewable tablet  Take 81 mg by mouth daily             FERROUS SULFATE CR PO               ibuprofen (ADVIL/MOTRIN) 800 MG tablet  Take 1 tablet (800 mg) by mouth every 6 hours             labetalol (NORMODYNE) 100 MG tablet  Take 1 tablet (100 mg) by mouth 2 times daily             oxyCODONE (ROXICODONE) 5 MG tablet  Take 1 tablet (5 mg) by mouth every 4 hours as needed for moderate to severe pain             Prenat w/o H-FY-Yhmzotn-FA-DHA (PNV-DHA PO)               progesterone (PROMETRIUM) 200 MG capsule  Place 1 capsule (200 mg) vaginally daily             VITAMIN D PO  Take 5,000 Int'l Units by mouth                 Course of Care:   Patient admitted with pre-eclampsia with severe features and di di twin gestation.  She was started on magnesium sulfate and underwent a primary  section.  She had normal labs at discharge and went home on labetalol 100 mg BID.  She was to follow up following week for labs and blood pressure evaluation.

## 2020-07-05 ENCOUNTER — NURSE TRIAGE (OUTPATIENT)
Dept: NURSING | Facility: CLINIC | Age: 40
End: 2020-07-05

## 2020-07-05 NOTE — TELEPHONE ENCOUNTER
Patient states she accidentally took second dose of ordered labetalol at 12:30PM; took first dose at 8AM.  Feels fine and says blood pressure is fine.  FNA advised to not take any more this date and paged on call provider, Dr. KADEN Mata, via [employer] at 1:25PM to contact patient at [phone].  Instructed patient she should hear back within 20-30 minutes.

## 2020-07-07 ENCOUNTER — OFFICE VISIT (OUTPATIENT)
Dept: OBGYN | Facility: CLINIC | Age: 40
End: 2020-07-07
Payer: COMMERCIAL

## 2020-07-07 VITALS
HEART RATE: 80 BPM | BODY MASS INDEX: 28.08 KG/M2 | WEIGHT: 182 LBS | DIASTOLIC BLOOD PRESSURE: 84 MMHG | SYSTOLIC BLOOD PRESSURE: 140 MMHG

## 2020-07-07 DIAGNOSIS — O14.90 PRE-ECLAMPSIA, ANTEPARTUM: Primary | ICD-10-CM

## 2020-07-07 LAB
ERYTHROCYTE [DISTWIDTH] IN BLOOD BY AUTOMATED COUNT: 12.8 % (ref 10–15)
HCT VFR BLD AUTO: 32 % (ref 35–47)
HGB BLD-MCNC: 10.5 G/DL (ref 11.7–15.7)
MCH RBC QN AUTO: 30.4 PG (ref 26.5–33)
MCHC RBC AUTO-ENTMCNC: 32.8 G/DL (ref 31.5–36.5)
MCV RBC AUTO: 93 FL (ref 78–100)
PLATELET # BLD AUTO: 337 10E9/L (ref 150–450)
RBC # BLD AUTO: 3.45 10E12/L (ref 3.8–5.2)
WBC # BLD AUTO: 8.7 10E9/L (ref 4–11)

## 2020-07-07 PROCEDURE — 85027 COMPLETE CBC AUTOMATED: CPT | Performed by: OBSTETRICS & GYNECOLOGY

## 2020-07-07 PROCEDURE — 36415 COLL VENOUS BLD VENIPUNCTURE: CPT | Performed by: OBSTETRICS & GYNECOLOGY

## 2020-07-07 PROCEDURE — 80053 COMPREHEN METABOLIC PANEL: CPT | Performed by: OBSTETRICS & GYNECOLOGY

## 2020-07-07 PROCEDURE — 99213 OFFICE O/P EST LOW 20 MIN: CPT | Mod: 24 | Performed by: OBSTETRICS & GYNECOLOGY

## 2020-07-07 RX ORDER — LABETALOL 200 MG/1
200 TABLET, FILM COATED ORAL 2 TIMES DAILY
Qty: 90 TABLET | Refills: 1 | Status: SHIPPED | OUTPATIENT
Start: 2020-07-07 | End: 2020-10-20 | Stop reason: DRUGHIGH

## 2020-07-07 NOTE — PROGRESS NOTES
SUBJECTIVE:                                                   Cristin Huerta is a 39 year old female who presents to clinic today for the following health issue(s):  Patient presents with:  Post Partum Exam: 1 week f/u      Additional information: lab work needed    HPI:  She isn't having any symptoms associated with pre-eclampsia.  Yesterday had a severe bp yesterday.  Will increase her medication today.    Patient seeing lactation.    No LMP recorded..     Patient is not sexually active, .  Using none for contraception.    reports that she has quit smoking. She has never used smokeless tobacco.    STD testing offered?  Declined    Health maintenance updated:  no    Today's PHQ-2 Score:   PHQ-2 (  [employer]) 2020   Q1: Little interest or pleasure in doing things 0   Q2: Feeling down, depressed or hopeless 0   PHQ-2 Score 0   Q1: Little interest or pleasure in doing things -   Q2: Feeling down, depressed or hopeless -   PHQ-2 Score -     Today's PHQ-9 Score:   PHQ-9 SCORE 2019   PHQ-9 Total Score 2     Today's CATHY-7 Score:   CATHY-7 SCORE 2019   Total Score 0       Problem list and histories reviewed & adjusted, as indicated.  Additional history: as documented.    Patient Active Problem List   Diagnosis     Supervision of high-risk pregnancy     Cervical insufficiency during pregnancy in second trimester, antepartum     Dichorionic diamniotic twin pregnancy in second trimester     Indication for care in labor or delivery     Twin pregnancy, antepartum     Pre-eclampsia, antepartum      delivery delivered     Past Surgical History:   Procedure Laterality Date     CERCLAGE CERVICAL N/A 3/5/2020    Procedure: CERCLAGE, CERVIX, VAGINAL APPROACH;  Surgeon: Brady Trujillo MD;  Location:  [facility]      SECTION N/A 2020    Procedure: PRIMARY  SECTION;  Surgeon: Riana Troy MD;  Location:  [facility]     GYN SURGERY      colposcopy, LEEP, R tubal removal, D and  C     HEAD & NECK SURGERY      jaw surgery     ORTHOPEDIC SURGERY      wrist      Social History     Tobacco Use     Smoking status: Former Smoker     Smokeless tobacco: Never Used     Tobacco comment: Quit 2005   Substance Use Topics     Alcohol use: Not Currently      Problem (# of Occurrences) Relation (Name,Age of Onset)    Colon Cancer (2) Maternal Grandmother, Paternal Grandfather    Liver Cancer (1) Maternal Grandfather    Lymphoma (1) Maternal Grandfather    No Known Problems (6) Mother, Father, Sister, Brother, Paternal Grandmother, Other            Current Outpatient Medications   Medication Sig     FERROUS SULFATE CR PO      ibuprofen (ADVIL/MOTRIN) 800 MG tablet Take 1 tablet (800 mg) by mouth every 6 hours     labetalol (NORMODYNE) 100 MG tablet Take 1 tablet (100 mg) by mouth 2 times daily     oxyCODONE (ROXICODONE) 5 MG tablet Take 1 tablet (5 mg) by mouth every 4 hours as needed for moderate to severe pain     Prenat w/o D-CU-Zjhdduw-FA-DHA (PNV-DHA PO)      VITAMIN D PO Take 5,000 Int'l Units by mouth     aspirin (ASA) 81 MG chewable tablet Take 81 mg by mouth daily     progesterone (PROMETRIUM) 200 MG capsule Place 1 capsule (200 mg) vaginally daily     No current facility-administered medications for this visit.      No Known Allergies    ROS:  12 point review of systems negative other than symptoms noted below or in the HPI.  No urinary frequency or dysuria, bladder or kidney problems      OBJECTIVE:     BP (!) 140/84   Pulse 80   Wt 182 lb (82.6 kg)   BMI 28.08 kg/m    Body mass index is 28.08 kg/m .    Exam:  Constitutional:  Appearance: Well nourished, well developed alert, in no acute distress  Chest:  Respiratory Effort:  Breathing unlabored.   Cardiovascular: Heart: Auscultation:  No edema  Skin: General Inspection:  No rashes present, no lesions present, no areas of discoloration.  Inc healing well, no signs of infection.  Neurologic:  Mental Status:  Oriented X3.  Normal strength and  tone, sensory exam grossly normal, mentation intact and speech normal.    Psychiatric:  Mentation appears normal and affect normal/bright.     In-Clinic Test Results:  No results found for this or any previous visit (from the past 24 hour(s)).    ASSESSMENT/PLAN:                                                        ICD-10-CM    1. Pre-eclampsia, antepartum  O14.90 CBC with platelets     **Comprehensive metabolic panel FUTURE anytime       There are no Patient Instructions on file for this visit.    1. Labetalol increased to 200 mg BID, will check bp twice a day this week.  2. Labs today  3. No infection  4. Mood- stable at this time, discussed therapist    Riana Jang MD  [facility]

## 2020-07-08 LAB
ALBUMIN SERPL-MCNC: 2.8 G/DL (ref 3.4–5)
ALP SERPL-CCNC: 107 U/L (ref 40–150)
ALT SERPL W P-5'-P-CCNC: 23 U/L (ref 0–50)
ANION GAP SERPL CALCULATED.3IONS-SCNC: 8 MMOL/L (ref 3–14)
AST SERPL W P-5'-P-CCNC: 16 U/L (ref 0–45)
BILIRUB SERPL-MCNC: 0.2 MG/DL (ref 0.2–1.3)
BUN SERPL-MCNC: 20 MG/DL (ref 7–30)
CALCIUM SERPL-MCNC: 8.7 MG/DL (ref 8.5–10.1)
CHLORIDE SERPL-SCNC: 109 MMOL/L (ref 94–109)
CO2 SERPL-SCNC: 21 MMOL/L (ref 20–32)
CREAT SERPL-MCNC: 0.82 MG/DL (ref 0.52–1.04)
GFR SERPL CREATININE-BSD FRML MDRD: >90 ML/MIN/{1.73_M2}
GLUCOSE SERPL-MCNC: 85 MG/DL (ref 70–99)
POTASSIUM SERPL-SCNC: 4.1 MMOL/L (ref 3.4–5.3)
PROT SERPL-MCNC: 6.5 G/DL (ref 6.8–8.8)
SODIUM SERPL-SCNC: 138 MMOL/L (ref 133–144)

## 2020-07-10 NOTE — PROGRESS NOTES
"Cristin Huerta is a 39 year old female who is being evaluated via a billable telephone visit.      The patient has been notified of following:     \"This telephone visit will be conducted via a call between you and your physician/provider. We have found that certain health care needs can be provided without the need for a physical exam.  This service lets us provide the care you need with a short phone conversation.  If a prescription is necessary we can send it directly to your pharmacy.  If lab work is needed we can place an order for that and you can then stop by our lab to have the test done at a later time.    Telephone visits are billed at different rates depending on your insurance coverage. During this emergency period, for some insurers they may be billed the same as an in-person visit.  Please reach out to your insurance provider with any questions.    If during the course of the call the physician/provider feels a telephone visit is not appropriate, you will not be charged for this service.\"    Patient has given verbal consent for Telephone visit?  Yes    What phone number would you like to be contacted at? [phone]     How would you like to obtain your AVS? MyChart    Phone call duration: 15 minutes    Riana Jang MD       SUBJECTIVE:                                                   Cristin Huerta is a 39 year old female who presents to clinic today for the following health issue(s):  Patient presents with:  Follow Up: moods feel better, BP seem to be doing better, had one higher reading  at 140/90 after 10 minutes at rest came down to 127/86      HPI:  Patient doing well with blood pressure .  She isn't having any dizziness.  No new concerns.  She states her mood is better.  She states she has more control over her symptoms.      No LMP recorded..     Patient is not sexually active, .  Using not sexually active for contraception.    reports that she has quit smoking. She has never used " smokeless tobacco.    STD testing offered?  Declined    Health maintenance updated:  no, pap at PP visit    Today's PHQ-2 Score:   PHQ-2 (  [employer]) 2020   Q1: Little interest or pleasure in doing things 0   Q2: Feeling down, depressed or hopeless 1   PHQ-2 Score 1   Q1: Little interest or pleasure in doing things -   Q2: Feeling down, depressed or hopeless -   PHQ-2 Score -     Today's PHQ-9 Score:   PHQ-9 SCORE 2019   PHQ-9 Total Score 2     Today's CATHY-7 Score:   CATHY-7 SCORE 2019   Total Score 0       Problem list and histories reviewed & adjusted, as indicated.  Additional history: as documented.    Patient Active Problem List   Diagnosis     Supervision of high-risk pregnancy     Cervical insufficiency during pregnancy in second trimester, antepartum     Dichorionic diamniotic twin pregnancy in second trimester     Indication for care in labor or delivery     Twin pregnancy, antepartum     Pre-eclampsia, antepartum      delivery delivered     Past Surgical History:   Procedure Laterality Date     CERCLAGE CERVICAL N/A 3/5/2020    Procedure: CERCLAGE, CERVIX, VAGINAL APPROACH;  Surgeon: Brady Trujillo MD;  Location:  [facility]      SECTION N/A 2020    Procedure: PRIMARY  SECTION;  Surgeon: Riana Troy MD;  Location:  [facility]     GYN SURGERY      colposcopy, LEEP, R tubal removal, D and C     HEAD & NECK SURGERY      jaw surgery     ORTHOPEDIC SURGERY      wrist      Social History     Tobacco Use     Smoking status: Former Smoker     Smokeless tobacco: Never Used     Tobacco comment: Quit    Substance Use Topics     Alcohol use: Not Currently      Problem (# of Occurrences) Relation (Name,Age of Onset)    Colon Cancer (2) Maternal Grandmother, Paternal Grandfather    Liver Cancer (1) Maternal Grandfather    Lymphoma (1) Maternal Grandfather    No Known Problems (6) Mother, Father, Sister, Brother, Paternal Grandmother, Other             Current Outpatient Medications   Medication Sig     FERROUS SULFATE CR PO      labetalol (NORMODYNE) 200 MG tablet Take 1 tablet (200 mg) by mouth 2 times daily     Prenat w/o S-UE-Mgbrohe-FA-DHA (PNV-DHA PO)      VITAMIN D PO Take 5,000 Int'l Units by mouth     labetalol (NORMODYNE) 100 MG tablet Take 1 tablet (100 mg) by mouth 2 times daily (Patient not taking: Reported on 7/14/2020)     No current facility-administered medications for this visit.      No Known Allergies    ROS:    No urinary frequency or dysuria, bladder or kidney problems      OBJECTIVE:     /87   Wt 79.4 kg (175 lb)   BMI 27.00 kg/m    Body mass index is 27 kg/m .    Exam:  Constitutional: mentation normal and appropriate    In-Clinic Test Results:  No results found for this or any previous visit (from the past 24 hour(s)).    ASSESSMENT/PLAN:                                                        ICD-10-CM    1. Supervision of high risk pregnancy in third trimester  O09.93        There are no Patient Instructions on file for this visit.    1. Continue the labetalol 200 mg BID at this time.  She is going to continue to watch bps  2. Mood is stable and she will continue to watch her symptoms.    Riana Jang MD  [facility] [place]

## 2020-07-14 ENCOUNTER — VIRTUAL VISIT (OUTPATIENT)
Dept: OBGYN | Facility: CLINIC | Age: 40
End: 2020-07-14
Payer: COMMERCIAL

## 2020-07-14 VITALS — DIASTOLIC BLOOD PRESSURE: 87 MMHG | BODY MASS INDEX: 27 KG/M2 | SYSTOLIC BLOOD PRESSURE: 128 MMHG | WEIGHT: 175 LBS

## 2020-07-14 DIAGNOSIS — O09.93 SUPERVISION OF HIGH RISK PREGNANCY IN THIRD TRIMESTER: ICD-10-CM

## 2020-07-14 PROCEDURE — 99024 POSTOP FOLLOW-UP VISIT: CPT | Performed by: OBSTETRICS & GYNECOLOGY

## 2020-07-17 ENCOUNTER — MEDICAL CORRESPONDENCE (OUTPATIENT)
Dept: HEALTH INFORMATION MANAGEMENT | Facility: CLINIC | Age: 40
End: 2020-07-17

## 2020-07-21 ENCOUNTER — MYC MEDICAL ADVICE (OUTPATIENT)
Dept: OBGYN | Facility: CLINIC | Age: 40
End: 2020-07-21

## 2020-07-23 NOTE — TELEPHONE ENCOUNTER
Forms filled out and had in-clinic provider sign them as [facility] is out of clinic till next week,. Pt has already delivered

## 2020-08-11 ENCOUNTER — OFFICE VISIT (OUTPATIENT)
Dept: OBGYN | Facility: CLINIC | Age: 40
End: 2020-08-11
Payer: COMMERCIAL

## 2020-08-11 VITALS
HEIGHT: 68 IN | SYSTOLIC BLOOD PRESSURE: 120 MMHG | WEIGHT: 174.8 LBS | DIASTOLIC BLOOD PRESSURE: 62 MMHG | BODY MASS INDEX: 26.49 KG/M2

## 2020-08-11 PROBLEM — O09.90 SUPERVISION OF HIGH-RISK PREGNANCY: Status: RESOLVED | Noted: 2019-12-20 | Resolved: 2020-08-11

## 2020-08-11 PROCEDURE — 99207 ZZC POST PARTUM EXAM: CPT | Performed by: OBSTETRICS & GYNECOLOGY

## 2020-08-11 RX ORDER — POLYETHYLENE GLYCOL 3350 17 G/17G
1 POWDER, FOR SOLUTION ORAL DAILY
COMMUNITY
End: 2020-09-03

## 2020-08-11 ASSESSMENT — PATIENT HEALTH QUESTIONNAIRE - PHQ9
SUM OF ALL RESPONSES TO PHQ QUESTIONS 1-9: 4
5. POOR APPETITE OR OVEREATING: NOT AT ALL

## 2020-08-11 ASSESSMENT — ANXIETY QUESTIONNAIRES
6. BECOMING EASILY ANNOYED OR IRRITABLE: SEVERAL DAYS
1. FEELING NERVOUS, ANXIOUS, OR ON EDGE: SEVERAL DAYS
3. WORRYING TOO MUCH ABOUT DIFFERENT THINGS: NOT AT ALL
7. FEELING AFRAID AS IF SOMETHING AWFUL MIGHT HAPPEN: NOT AT ALL
GAD7 TOTAL SCORE: 2
2. NOT BEING ABLE TO STOP OR CONTROL WORRYING: NOT AT ALL
5. BEING SO RESTLESS THAT IT IS HARD TO SIT STILL: NOT AT ALL
IF YOU CHECKED OFF ANY PROBLEMS ON THIS QUESTIONNAIRE, HOW DIFFICULT HAVE THESE PROBLEMS MADE IT FOR YOU TO DO YOUR WORK, TAKE CARE OF THINGS AT HOME, OR GET ALONG WITH OTHER PEOPLE: NOT DIFFICULT AT ALL

## 2020-08-11 ASSESSMENT — MIFFLIN-ST. JEOR: SCORE: 1508.45

## 2020-08-11 NOTE — PROGRESS NOTES
"  SUBJECTIVE:  Cristin Huerta,  is here for a postpartum visit.  She had a  Section  on 2020 delivering a healthy baby twin girls, named Eva weighing 4 lbs 9 oz at term and named Sagrario weighing 4 lbs 3 oz at term.      HPI:  No new concerns  Last PHQ-9 score on record=   PHQ-9 SCORE 2020   PHQ-9 Total Score 4     CATHY-7 SCORE 2019   Total Score 0 2       Pap: 11/15/2017- NIL, HPV-    Delivery complications:  Yes, Preeclampsia  Breast feeding:  Yes, Patient is pumping and doing some formula  Bladder problems:  No  Bowel problems/hemorrhoids:  Yes, is having some blood in her stool  Episiotomy/laceration/incision healed? Yes: seems to look really good  Vaginal flow:  None  [place]:  No  Contraception: unsure  Emotional adjustment:  doing well and happy  Back to work:  Patient is still furloughed but may go back in  point review of systems negative other than symptoms noted below or in the HPI.  Musculoskeletal: Joint Pain    OBJECTIVE:  Vitals: /62   Ht 1.715 m (5' 7.5\")   Wt 79.3 kg (174 lb 12.8 oz)   Breastfeeding Yes   BMI 26.97 kg/m    BMI= Body mass index is 26.97 kg/m .  General - pleasant female in no acute distress.    Abdomen - Incision well-healed    ASSESSMENT:    ICD-10-CM    1. Routine postpartum follow-up  Z39.2        PLAN:  May resume normal activities without restrictions.  Full counseling was provided, and all questions were answered.   She is on the labetalol 200 mg BID still at this time-home bps have been stable same range.  Will continue at this time. Continue bp checks at home.  Discussed titration of the medication.  Discussed below this range will decrease to 100 BID.  Uncertain about birth control- considering mirena    There are no Patient Instructions on file for this visit.  Riana Jang MD      "

## 2020-08-12 ASSESSMENT — ANXIETY QUESTIONNAIRES: GAD7 TOTAL SCORE: 2

## 2020-09-01 ENCOUNTER — MYC MEDICAL ADVICE (OUTPATIENT)
Dept: OBGYN | Facility: CLINIC | Age: 40
End: 2020-09-01

## 2020-09-01 NOTE — TELEPHONE ENCOUNTER
Type of Paperwork received:  Return to work    Date Rcvd:  9/1/2020    Rcvd From (Company name): [employer]    Provider:  babita Limon on Provider Cart Date:  9/1/2020

## 2020-09-03 ENCOUNTER — OFFICE VISIT (OUTPATIENT)
Dept: FAMILY MEDICINE | Facility: CLINIC | Age: 40
End: 2020-09-03
Payer: COMMERCIAL

## 2020-09-03 VITALS — DIASTOLIC BLOOD PRESSURE: 80 MMHG | SYSTOLIC BLOOD PRESSURE: 122 MMHG

## 2020-09-03 DIAGNOSIS — L74.519 FOCAL HYPERHIDROSIS: Primary | ICD-10-CM

## 2020-09-03 PROCEDURE — 99203 OFFICE O/P NEW LOW 30 MIN: CPT | Performed by: PHYSICIAN ASSISTANT

## 2020-09-03 NOTE — PROGRESS NOTES
HPI:  Cristin Huerta is a 39 year old female patient here today for sweating of hands and feet .  Patient states this has been present for years.  Patient reports the following symptoms: excess sweat. Pt has to bring towels with her to wipe away the sweat. Interferes with daily life. Has twins at home and has to wipe off hands before touching them to avoid getting sweat all over them .  Patient reports the following previous treatments: none.  Patient reports the following modifying factors: none.  Associated symptoms: none.  Patient has no other skin complaints today.  Remainder of the HPI, Meds, PMH, Allergies, FH, and SH was reviewed in chart.    Past Medical History:   Diagnosis Date     Depression        Past Surgical History:   Procedure Laterality Date     CERCLAGE CERVICAL N/A 3/5/2020    Procedure: CERCLAGE, CERVIX, VAGINAL APPROACH;  Surgeon: Brady Trujillo MD;  Location:  [facility]      SECTION N/A 2020    Procedure: PRIMARY  SECTION;  Surgeon: Riana Troy MD;  Location:  [facility]     GYN SURGERY      colposcopy, LEEP, R tubal removal, D and C     HEAD & NECK SURGERY      jaw surgery     ORTHOPEDIC SURGERY      wrist        Family History   Problem Relation Age of Onset     Colon Cancer Maternal Grandmother      Liver Cancer Maternal Grandfather      Lymphoma Maternal Grandfather      Colon Cancer Paternal Grandfather      No Known Problems Mother      Skin Cancer Father      No Known Problems Sister      No Known Problems Brother      No Known Problems Paternal Grandmother      No Known Problems Other        Social History     Socioeconomic History     Marital status:      Spouse name: Not on file     Number of children: Not on file     Years of education: Not on file     Highest education level: Not on file   Occupational History     Not on file   Social Needs     Financial resource strain: Not on file     Food insecurity     Worry: Not on file     Inability: Not  on file     Transportation needs     Medical: Not on file     Non-medical: Not on file   Tobacco Use     Smoking status: Former Smoker     Smokeless tobacco: Never Used     Tobacco comment: Quit 2005   Substance and Sexual Activity     Alcohol use: Not Currently     Drug use: Never     Sexual activity: Not Currently     Partners: Male     Birth control/protection: None   Lifestyle     Physical activity     Days per week: Not on file     Minutes per session: Not on file     Stress: Not on file   Relationships     Social connections     Talks on phone: Not on file     Gets together: Not on file     Attends Hinduism service: Not on file     Active member of club or organization: Not on file     Attends meetings of clubs or organizations: Not on file     Relationship status: Not on file     Intimate partner violence     Fear of current or ex partner: Not on file     Emotionally abused: Not on file     Physically abused: Not on file     Forced sexual activity: Not on file   Other Topics Concern     Parent/sibling w/ CABG, MI or angioplasty before 65F 55M? Not Asked   Social History Narrative     Not on file       Outpatient Encounter Medications as of 9/3/2020   Medication Sig Dispense Refill     FERROUS SULFATE CR PO        labetalol (NORMODYNE) 200 MG tablet Take 1 tablet (200 mg) by mouth 2 times daily 90 tablet 1     Prenat w/o D-ST-Kgwqbkp-FA-DHA (PNV-DHA PO)        VITAMIN D PO Take 5,000 Int'l Units by mouth       [DISCONTINUED] polyethylene glycol (MIRALAX) 17 g packet Take 1 packet by mouth daily       No facility-administered encounter medications on file as of 9/3/2020.        Review Of Systems:  Skin: sweat  Eyes: negative  Ears/Nose/Throat: negative  Respiratory: No shortness of breath, dyspnea on exertion, cough, or hemoptysis  Cardiovascular: negative  Gastrointestinal: negative  Genitourinary: negative  Musculoskeletal: negative  Neurologic: negative  Psychiatric:  negative  Hematologic/Lymphatic/Immunologic: negative  Endocrine: negative      Objective:     /80   Breastfeeding Yes   Eyes: Conjunctivae/lids: Normal   ENT: Lips:  Normal  MSK: Normal  Cardiovascular: Peripheral edema none  Pulm: Breathing Normal  Neuro/Psych: Orientation: A/O x 3 Normal; Mood/Affect: Normal, NAD, WDWN  Pt accompanied by: self  Following areas examined: Face ( patient is wearing face mask), hands, feet, forearsm    Vincent skin type:ii   Findings:  Excess moisture on hands and feet. No dermatitis seen  Assessment and Plan:  1) focal hyperhidrosis  Disc etiology and course. Disc Drysol, Qbrexza, iontophoresis, oral anticholinergics, Botox, and Surgery.  Pt breastfeeding. Possibly okay in breastfeeing iontophoresis, drysol and qbrexza. Avoid others orals and botox.   Glycopyrrolate (Robinul) is an anticholinergic medication. There are no routine tests needed before starting this medication. Common side effects include but are not limited to constipation, headache, urinary retention and/or hesitation, blurred vision, and flushing. It is very important to drink plenty of water while on this medication. If you are unable to do so this medication is not recommended for you.  Pt will call in a week to let us know what she would like to start.      Follow up in 1 month after starting medicaiton

## 2020-09-03 NOTE — LETTER
9/3/2020         RE: Cristin Huerta  [address]  [place] [address]        Dear Colleague,    Thank you for referring your patient, Cristin Huerta, to the [facility] [place]. Please see a copy of my visit note below.    HPI:  Cristin Huerta is a 39 year old female patient here today for sweating of hands and feet .  Patient states this has been present for years.  Patient reports the following symptoms: excess sweat. Pt has to bring towels with her to wipe away the sweat. Interferes with daily life. Has twins at home and has to wipe off hands before touching them to avoid getting sweat all over them .  Patient reports the following previous treatments: none.  Patient reports the following modifying factors: none.  Associated symptoms: none.  Patient has no other skin complaints today.  Remainder of the HPI, Meds, PMH, Allergies, FH, and SH was reviewed in chart.    Past Medical History:   Diagnosis Date     Depression        Past Surgical History:   Procedure Laterality Date     CERCLAGE CERVICAL N/A 3/5/2020    Procedure: CERCLAGE, CERVIX, VAGINAL APPROACH;  Surgeon: Brady Trujillo MD;  Location: [facility]      SECTION N/A 2020    Procedure: PRIMARY  SECTION;  Surgeon: Riana Troy MD;  Location:  L+D     GYN SURGERY      colposcopy, LEEP, R tubal removal, D and C     HEAD & NECK SURGERY      jaw surgery     ORTHOPEDIC SURGERY      wrist        Family History   Problem Relation Age of Onset     Colon Cancer Maternal Grandmother      Liver Cancer Maternal Grandfather      Lymphoma Maternal Grandfather      Colon Cancer Paternal Grandfather      No Known Problems Mother      Skin Cancer Father      No Known Problems Sister      No Known Problems Brother      No Known Problems Paternal Grandmother      No Known Problems Other        Social History     Socioeconomic History     Marital status:      Spouse name: Not on file     Number of children: Not on file      Years of education: Not on file     Highest education level: Not on file   Occupational History     Not on file   Social Needs     Financial resource strain: Not on file     Food insecurity     Worry: Not on file     Inability: Not on file     Transportation needs     Medical: Not on file     Non-medical: Not on file   Tobacco Use     Smoking status: Former Smoker     Smokeless tobacco: Never Used     Tobacco comment: Quit 2005   Substance and Sexual Activity     Alcohol use: Not Currently     Drug use: Never     Sexual activity: Not Currently     Partners: Male     Birth control/protection: None   Lifestyle     Physical activity     Days per week: Not on file     Minutes per session: Not on file     Stress: Not on file   Relationships     Social connections     Talks on phone: Not on file     Gets together: Not on file     Attends Jew service: Not on file     Active member of club or organization: Not on file     Attends meetings of clubs or organizations: Not on file     Relationship status: Not on file     Intimate partner violence     Fear of current or ex partner: Not on file     Emotionally abused: Not on file     Physically abused: Not on file     Forced sexual activity: Not on file   Other Topics Concern     Parent/sibling w/ CABG, MI or angioplasty before 65F 55M? Not Asked   Social History Narrative     Not on file       Outpatient Encounter Medications as of 9/3/2020   Medication Sig Dispense Refill     FERROUS SULFATE CR PO        labetalol (NORMODYNE) 200 MG tablet Take 1 tablet (200 mg) by mouth 2 times daily 90 tablet 1     Prenat w/o I-UY-Bcegohk-FA-DHA (PNV-DHA PO)        VITAMIN D PO Take 5,000 Int'l Units by mouth       [DISCONTINUED] polyethylene glycol (MIRALAX) 17 g packet Take 1 packet by mouth daily       No facility-administered encounter medications on file as of 9/3/2020.        Review Of Systems:  Skin: sweat  Eyes: negative  Ears/Nose/Throat: negative  Respiratory: No shortness of  breath, dyspnea on exertion, cough, or hemoptysis  Cardiovascular: negative  Gastrointestinal: negative  Genitourinary: negative  Musculoskeletal: negative  Neurologic: negative  Psychiatric: negative  Hematologic/Lymphatic/Immunologic: negative  Endocrine: negative      Objective:     /80   Breastfeeding Yes   Eyes: Conjunctivae/lids: Normal   ENT: Lips:  Normal  MSK: Normal  Cardiovascular: Peripheral edema none  Pulm: Breathing Normal  Neuro/Psych: Orientation: A/O x 3 Normal; Mood/Affect: Normal, NAD, WDWN  Pt accompanied by: self  Following areas examined: Face ( patient is wearing face mask), hands, feet, forearsm    Vincent skin type:ii   Findings:  Excess moisture on hands and feet. No dermatitis seen  Assessment and Plan:  1) focal hyperhidrosis  Disc etiology and course. Disc Drysol, Qbrexza, iontophoresis, oral anticholinergics, Botox, and Surgery.  Pt breastfeeding. Possibly okay in breastfeeing iontophoresis, drysol and qbrexza. Avoid others orals and botox.   Glycopyrrolate (Robinul) is an anticholinergic medication. There are no routine tests needed before starting this medication. Common side effects include but are not limited to constipation, headache, urinary retention and/or hesitation, blurred vision, and flushing. It is very important to drink plenty of water while on this medication. If you are unable to do so this medication is not recommended for you.  Pt will call in a week to let us know what she would like to start.      Follow up in 1 month after starting medicaiton      Again, thank you for allowing me to participate in the care of your patient.        Sincerely,        Sophie Tony PA-C

## 2020-09-03 NOTE — PATIENT INSTRUCTIONS
Proper skin care from [facility]:    -Eliminate harsh soaps as they strip the natural oils from the skin, often resulting in dry itchy skin ( i.e. Dial, Zest, Sondra Spring)  -Use mild soaps such as Cetaphil or Dove Sensitive Skin in the shower. You do not need to use soap on arms, legs, and trunk every time you shower unless visibly soiled.   -Avoid hot or cold showers.  -After showering, lightly dry off and apply moisturizing within 2-3 minutes. This will help trap moisture in the skin.   -Aggressive use of a moisturizer at least 1-2 times a day to the entire body (including -Vanicream, Cetaphil, Aquaphor or Cerave) and moisturize hands after every washing.  -We recommend using moisturizers that come in a tub that needs to be scooped out, not a pump. This has more of an oil base. It will hold moisture in your skin much better than a water base moisturizer. The above recommended are non-pore clogging.      Wear a sunscreen with at least SPF 30 on your face, ears, neck and V of the chest daily. Wear sunscreen on other areas of the body if those areas are exposed to the sun throughout the day. Sunscreens can contain physical and/or chemical blockers. Physical blockers are less likely to clog pores, these include zinc oxide and titanium dioxide. Reapply every two hour and after swimming. Sunscreen examples include Neutrogena, CeraVe, Blue Lizard, Elta MD and many others.    UV radiation  UVA radiation remains constant throughout the day and throughout the year. It is a longer wavelength than UVB and therefore penetrates deeper into the skin leading to immediate and delayed tanning, photoaging, and skin cancer. 70-80% of UVA and UVB radiation occurs between the hours of 10am-2pm.  UVB radiation  UVB radiation causes the most harmful effects and is more significant during the summer months. However, snow and ice can reflect UVB radiation leading to skin damage during the winter months as well. UVB radiation is  responsible for tanning, burning, inflammation, delayed erythema (pinkness), pigmentation (brown spots), and skin cancer.     Drysol, Qbrexza ( wipes) , iontophoresis, oral anticholinergics such as robinul, Botox ( avoid during breastfeeding), and Surgery.   [email or web address]

## 2020-09-10 ENCOUNTER — MYC MEDICAL ADVICE (OUTPATIENT)
Dept: OBGYN | Facility: CLINIC | Age: 40
End: 2020-09-10

## 2020-09-10 NOTE — TELEPHONE ENCOUNTER
Form completed and faxed to [facility] 9/10/20    Marilyn Cobb RN on 9/10/2020 at 1:17 PM

## 2020-09-23 ENCOUNTER — MYC MEDICAL ADVICE (OUTPATIENT)
Dept: FAMILY MEDICINE | Facility: CLINIC | Age: 40
End: 2020-09-23

## 2020-09-23 DIAGNOSIS — L74.519 FOCAL HYPERHIDROSIS: Primary | ICD-10-CM

## 2020-09-23 RX ORDER — ALUMINUM CHLORIDE 20 %
SOLUTION, NON-ORAL TOPICAL AT BEDTIME
Qty: 60 ML | Refills: 3 | Status: SHIPPED | OUTPATIENT
Start: 2020-09-23 | End: 2021-07-30

## 2020-10-19 ENCOUNTER — MYC MEDICAL ADVICE (OUTPATIENT)
Dept: FAMILY MEDICINE | Facility: CLINIC | Age: 40
End: 2020-10-19

## 2020-10-19 ENCOUNTER — MYC MEDICAL ADVICE (OUTPATIENT)
Dept: DERMATOLOGY | Facility: CLINIC | Age: 40
End: 2020-10-19

## 2020-10-19 NOTE — TELEPHONE ENCOUNTER
We could try the qbrexa wipes. Insurance may cover them for the hands.    We could try a lower strength of the drysol too.

## 2020-10-19 NOTE — TELEPHONE ENCOUNTER
IPDIA message sent:    [facility] said we can try the qbrexa wipes- insurance may cover them for the hands.    We could try a lower strength of the drysol too.    Let me know how you would like to proceed.     Millicent RN-BSN-N  [facility]  [phone]

## 2020-10-22 DIAGNOSIS — O14.90 PRE-ECLAMPSIA, ANTEPARTUM: ICD-10-CM

## 2020-10-22 RX ORDER — LABETALOL 100 MG/1
100 TABLET, FILM COATED ORAL 2 TIMES DAILY
Qty: 60 TABLET | Refills: 1 | Status: SHIPPED | OUTPATIENT
Start: 2020-10-22 | End: 2021-01-20

## 2020-11-16 ENCOUNTER — HEALTH MAINTENANCE LETTER (OUTPATIENT)
Age: 40
End: 2020-11-16

## 2020-12-21 ENCOUNTER — HOSPITAL ENCOUNTER (OUTPATIENT)
Dept: MAMMOGRAPHY | Facility: CLINIC | Age: 40
Discharge: HOME | End: 2020-12-21
Attending: OBSTETRICS & GYNECOLOGY | Admitting: OBSTETRICS & GYNECOLOGY
Payer: COMMERCIAL

## 2020-12-21 DIAGNOSIS — Z12.31 SCREENING MAMMOGRAM, ENCOUNTER FOR: ICD-10-CM

## 2020-12-21 PROCEDURE — 77067 SCR MAMMO BI INCL CAD: CPT

## 2021-01-11 DIAGNOSIS — O14.90 PRE-ECLAMPSIA, ANTEPARTUM: ICD-10-CM

## 2021-01-11 NOTE — TELEPHONE ENCOUNTER
"Requested Prescriptions   Pending Prescriptions Disp Refills     labetalol (NORMODYNE) 100 MG tablet 60 tablet 1     Sig: Take 1 tablet (100 mg) by mouth 2 times daily       Beta-Blockers Protocol Passed - 1/11/2021  5:01 PM        Passed - Blood pressure under 140/90 in past 12 months     BP Readings from Last 3 Encounters:   09/03/20 122/80   08/11/20 120/62   07/14/20 128/87                 Passed - Patient is age 6 or older        Passed - Recent (12 mo) or future (30 days) visit within the authorizing provider's specialty     Patient has had an office visit with the authorizing provider or a provider within the authorizing providers department within the previous 12 mos or has a future within next 30 days. See \"Patient Info\" tab in inbasket, or \"Choose Columns\" in Meds & Orders section of the refill encounter.              Passed - Medication is active on med list           Last Written Prescription Date:  10/22/2020  Last Fill Quantity: 60,  # refills: 1   Last office visit: 8/11/2020 with prescribing provider:  Riana Woody   Future Office Visit: none     Still taking med BID has not been checking her blood pressures. Has a weeks worth of medication left. Will check BP's and update us via [employer].   Lia Ga RN on 1/12/2021 at 11:02 AM          "

## 2021-01-12 RX ORDER — LABETALOL 100 MG/1
100 TABLET, FILM COATED ORAL 2 TIMES DAILY
Qty: 60 TABLET | Refills: 1 | OUTPATIENT
Start: 2021-01-12

## 2021-01-18 ENCOUNTER — MYC MEDICAL ADVICE (OUTPATIENT)
Dept: OBGYN | Facility: CLINIC | Age: 41
End: 2021-01-18

## 2021-01-18 DIAGNOSIS — O14.90 PRE-ECLAMPSIA, ANTEPARTUM: ICD-10-CM

## 2021-01-19 NOTE — TELEPHONE ENCOUNTER
We could try to do labetalol 100 mg daily at this time and have her track, if still normal after 3-5 days can stop the medication.  I would send her 30 tabs and have her take daily at this time

## 2021-01-20 RX ORDER — LABETALOL 100 MG/1
100 TABLET, FILM COATED ORAL DAILY
Qty: 30 TABLET | Refills: 0 | Status: SHIPPED | OUTPATIENT
Start: 2021-01-20 | End: 2021-07-30

## 2021-06-21 NOTE — DISCHARGE INSTRUCTIONS
Family made aware and all questions answered. Postop  Birth Instructions    Activity       Do not lift more than 10 pounds for 6 weeks after surgery.  Ask family and friends for help when you need it.    No driving until you have stopped taking your pain medications (usually two weeks after surgery).    No heavy exercise or activity for 6 weeks.  Don't do anything that will put a strain on your surgery site.    Don't strain when using the toilet.  Your care team may prescribe a stool softener if you have problems with your bowel movements.     To care for your incision:       Keep the incision clean and dry.    Do not soak your incision in water. No swimming or hot tubs until it has fully healed. You may soak in the bathtub if the water level is below your incision.    Do not use peroxide, gel, cream, lotion, or ointment on your incision.    Adjust your clothes to avoid pressure on your surgery site (check the elastic in your underwear for example).     You may see a small amount of clear or pink drainage and this is normal.  Check with your health care provider:       If the drainage increases or has an odor.    If the incision reddens, you have swelling, or develop a rash.    If you have increased pain and the medicine we prescribed doesn't help.    If you have a fever above 100.4 F (38 C) with or without chills when placing thermometer under your tongue.   The area around your incision (surgery wound), will feel numb.  This is normal. The numbness should go away in less than a year.     Keep your hands clean:  Always wash your hands before touching your incision (surgery wound). This helps reduce your risk of infection. If your hands aren't dirty, you may use an alcohol hand-rub to clean your hands. Keep your nails clean and short.    Call your healthcare provider if you have any of these symptoms:       You soak a sanitary pad with blood within 1 hour, or you see blood clots larger than a golf ball.    Bleeding that lasts more than 6  weeks.    Vaginal discharge that smells bad.    Severe pain, cramping or tenderness in your lower belly area.    A need to urinate more frequently (use the toilet more often), more urgently (use the toilet very quickly), or it burns when you urinate.    Nausea and vomiting.    Redness, swelling or pain around a vein in your leg.    Problems breastfeeding or a red or painful area on your breast.    Chest pain and cough or are gasping for air.    Problems with coping with sadness, anxiety or depression. If you have concerns about hurting yourself or the baby, call your provider immediately.      You have questions or concerns after you return home.

## 2021-07-30 ENCOUNTER — OFFICE VISIT (OUTPATIENT)
Dept: OBGYN | Facility: CLINIC | Age: 41
End: 2021-07-30
Payer: COMMERCIAL

## 2021-07-30 VITALS
SYSTOLIC BLOOD PRESSURE: 116 MMHG | DIASTOLIC BLOOD PRESSURE: 58 MMHG | BODY MASS INDEX: 23.95 KG/M2 | HEIGHT: 68 IN | WEIGHT: 158 LBS

## 2021-07-30 DIAGNOSIS — Z13.1 SCREENING FOR DIABETES MELLITUS: ICD-10-CM

## 2021-07-30 DIAGNOSIS — Z80.0 FAMILY HISTORY OF COLON CANCER: ICD-10-CM

## 2021-07-30 DIAGNOSIS — Z01.419 ENCOUNTER FOR GYNECOLOGICAL EXAMINATION WITHOUT ABNORMAL FINDING: Primary | ICD-10-CM

## 2021-07-30 DIAGNOSIS — Z13.6 CARDIOVASCULAR SCREENING; LDL GOAL LESS THAN 100: ICD-10-CM

## 2021-07-30 DIAGNOSIS — Z13.29 SCREENING FOR THYROID DISORDER: ICD-10-CM

## 2021-07-30 PROCEDURE — G0145 SCR C/V CYTO,THINLAYER,RESCR: HCPCS | Performed by: OBSTETRICS & GYNECOLOGY

## 2021-07-30 PROCEDURE — 87624 HPV HI-RISK TYP POOLED RSLT: CPT | Performed by: OBSTETRICS & GYNECOLOGY

## 2021-07-30 PROCEDURE — 99396 PREV VISIT EST AGE 40-64: CPT | Performed by: OBSTETRICS & GYNECOLOGY

## 2021-07-30 ASSESSMENT — ANXIETY QUESTIONNAIRES
1. FEELING NERVOUS, ANXIOUS, OR ON EDGE: NOT AT ALL
7. FEELING AFRAID AS IF SOMETHING AWFUL MIGHT HAPPEN: NOT AT ALL
2. NOT BEING ABLE TO STOP OR CONTROL WORRYING: NOT AT ALL
3. WORRYING TOO MUCH ABOUT DIFFERENT THINGS: NOT AT ALL
5. BEING SO RESTLESS THAT IT IS HARD TO SIT STILL: NOT AT ALL
IF YOU CHECKED OFF ANY PROBLEMS ON THIS QUESTIONNAIRE, HOW DIFFICULT HAVE THESE PROBLEMS MADE IT FOR YOU TO DO YOUR WORK, TAKE CARE OF THINGS AT HOME, OR GET ALONG WITH OTHER PEOPLE: NOT DIFFICULT AT ALL
6. BECOMING EASILY ANNOYED OR IRRITABLE: NOT AT ALL
GAD7 TOTAL SCORE: 0

## 2021-07-30 ASSESSMENT — PATIENT HEALTH QUESTIONNAIRE - PHQ9
5. POOR APPETITE OR OVEREATING: NOT AT ALL
SUM OF ALL RESPONSES TO PHQ QUESTIONS 1-9: 0

## 2021-07-30 ASSESSMENT — MIFFLIN-ST. JEOR: SCORE: 1427.24

## 2021-07-30 NOTE — PROGRESS NOTES
Cristin is a 40 year old  female who presents for annual exam.     Besides routine health maintenance, she would like to discuss veins on upper left thigh.    HPI:    Due for pap smear  Left upper thigh varicose veins.      GYNECOLOGIC HISTORY:    Patient's last menstrual period was 07/15/2021.  Regular menses? yes  Menses every 24 days  Length of menses: 4 days    Her current contraception method is: none.  She  reports that she has quit smoking. She has never used smokeless tobacco.    Patient is sexually active.    Last PHQ-9 score on record =   PHQ-9 SCORE 2021   PHQ-9 Total Score 0     Last GAD7 score on record =   CATHY-7 SCORE 2021   Total Score 0         HEALTH MAINTENANCE:  Cholesterol: found in Care Everywhere 11/15/17  Last Mammo: 20, Result: normal, Next mammo: due in December  Pap: 11/15/17 Neg, Neg-HPV  Colonoscopy:  N/A, Result: not applicable, Next Colonoscopy: screen age 50  Dexa:  N/A  Health maintenance updated:  Due for mammogram    HISTORY:  OB History    Para Term  AB Living   1 1 0 1 0 2   SAB TAB Ectopic Multiple Live Births   0 0 0 1 2      # Outcome Date GA Lbr Ariel/2nd Weight Sex Delivery Anes PTL Lv   1A  20 36w5d  2.07 kg (4 lb 9 oz) F    DEBORAH      Name: Eva      Apgar1: 8  Apgar5: 9   1B  20 36w5d  1.899 kg (4 lb 3 oz) F    DEBORAH      Name: Sagrario      Apgar1: 8  Apgar5: 9       Patient Active Problem List   Diagnosis     Cervical insufficiency during pregnancy in second trimester, antepartum     Dichorionic diamniotic twin pregnancy in second trimester     Indication for care in labor or delivery     Twin pregnancy, antepartum     Pre-eclampsia, antepartum      delivery delivered     Past Surgical History:   Procedure Laterality Date     CERCLAGE CERVICAL N/A 3/5/2020    Procedure: CERCLAGE, CERVIX, VAGINAL APPROACH;  Surgeon: Brady Trujillo MD;  Location: [facility]      SECTION N/A 2020    Procedure: PRIMARY  " SECTION;  Surgeon: Riana Troy MD;  Location:  L+D     GYN SURGERY      colposcopy, LEEP, R tubal removal, D and C     HEAD & NECK SURGERY      jaw surgery     ORTHOPEDIC SURGERY      wrist      Social History     Tobacco Use     Smoking status: Former Smoker     Smokeless tobacco: Never Used     Tobacco comment: Quit    Substance Use Topics     Alcohol use: Not Currently      Problem (# of Occurrences) Relation (Name,Age of Onset)    Colon Cancer (2) Maternal Grandmother (80), Paternal Grandfather (60)    Liver Cancer (1) Maternal Grandfather    Lymphoma (1) Maternal Grandfather    No Known Problems (5) Mother, Sister, Brother, Paternal Grandmother, Other    Skin Cancer (1) Father            No current outpatient medications on file.     No current facility-administered medications for this visit.     No Known Allergies    Past medical, surgical, social and family histories were reviewed and updated in EPIC.    ROS:   12 point review of systems negative other than symptoms noted below or in the HPI.  No urinary frequency or dysuria, bladder or kidney problems    EXAM:  /58   Ht 1.715 m (5' 7.5\")   Wt 71.7 kg (158 lb)   LMP 07/15/2021   BMI 24.38 kg/m     BMI: Body mass index is 24.38 kg/m .    PHYSICAL EXAM:  Constitutional:   Appearance: Well nourished, well developed, alert, in no acute distress    Chest:  Respiratory Effort:  Breathing unlabored  Cardiovascular:    Heart: Auscultation:  Regular rate, normal rhythm, no murmurs present  Breasts: Inspection of Breasts:  No lymphadenopathy present., Palpation of Breasts and Axillae:  No masses present on palpation, no breast tenderness., Axillary Lymph Nodes:  No lymphadenopathy present. and No nodularity, asymmetry or nipple discharge bilaterally.    Lymphatic: Lymph Nodes:  No other lymphadenopathy present  Skin:  General Inspection:  No rashes present, no lesions present, no areas of  discoloration  Neurologic:    " Mental Status:  Oriented X3.  Normal strength and tone, sensory exam                grossly normal, mentation intact and speech normal.    Psychiatric:   Mentation appears normal and affect normal/bright.         Pelvic Exam:  External Genitalia:     Normal appearance for age, no discharge present, no tenderness present, no inflammatory lesions present, color normal  Vagina:     Normal vaginal vault without central or paravaginal defects, no discharge present, no inflammatory lesions present, no masses present  Bladder:     Nontender to palpation  Urethra:   Urethral Body:  Urethra palpation normal, urethra structural support normal   Urethral Meatus:  No erythema or lesions present  Cervix:     Appearance healthy, no lesions present, nontender to palpation, no bleeding present  Uterus:     Uterus: firm, normal sized and nontender  Adnexa:     No adnexal tenderness present, no adnexal masses present  Perineum:     Perineum within normal limits, no evidence of trauma, no rashes or skin lesions present  Anus:     Anus within normal limits, no hemorrhoids present  Inguinal Lymph Nodes:     No lymphadenopathy present  Pubic Hair:     Normal pubic hair distribution for age  Genitalia and Groin:     No rashes present, no lesions present, no areas of discoloration, no masses present      COUNSELING:   Reviewed preventive health counseling, as reflected in patient instructions    BMI: Body mass index is 24.38 kg/m .      ASSESSMENT:  40 year old female with satisfactory annual exam.    ICD-10-CM    1. Encounter for gynecological examination without abnormal finding  Z01.419        PLAN:  1. Pap today  2. Mammogram December  3. fasting labs- ordered for future: lipid, glucose, tsh  4. Family history of cancer, no genetic testing done.  Significant family history of colon cancer and polyps, both sides.  Would do baseline colonoscopy due to history, and then could consider scheduling from their findings.      Riana Woody  MD Suhail

## 2021-07-31 ASSESSMENT — ANXIETY QUESTIONNAIRES: GAD7 TOTAL SCORE: 0

## 2021-08-03 LAB
BKR LAB AP GYN ADEQUACY: NORMAL
BKR LAB AP GYN INTERPRETATION: NORMAL
BKR LAB AP HPV REFLEX: NORMAL
BKR LAB AP LMP: NORMAL
BKR LAB AP PREVIOUS ABNORMAL: NORMAL
PATH REPORT.COMMENTS IMP SPEC: NORMAL
PATH REPORT.RELEVANT HX SPEC: NORMAL

## 2021-08-05 LAB
HUMAN PAPILLOMA VIRUS 16 DNA: NEGATIVE
HUMAN PAPILLOMA VIRUS 18 DNA: NEGATIVE
HUMAN PAPILLOMA VIRUS FINAL DIAGNOSIS: NORMAL
HUMAN PAPILLOMA VIRUS OTHER HR: NEGATIVE

## 2021-09-18 ENCOUNTER — HEALTH MAINTENANCE LETTER (OUTPATIENT)
Age: 41
End: 2021-09-18

## 2022-01-06 ENCOUNTER — OFFICE VISIT (OUTPATIENT)
Dept: OBGYN | Facility: CLINIC | Age: 42
End: 2022-01-06
Payer: COMMERCIAL

## 2022-01-06 ENCOUNTER — LAB (OUTPATIENT)
Dept: LAB | Facility: CLINIC | Age: 42
End: 2022-01-06
Payer: COMMERCIAL

## 2022-01-06 VITALS
DIASTOLIC BLOOD PRESSURE: 72 MMHG | HEIGHT: 68 IN | BODY MASS INDEX: 24.01 KG/M2 | SYSTOLIC BLOOD PRESSURE: 112 MMHG | WEIGHT: 158.4 LBS

## 2022-01-06 DIAGNOSIS — Z13.1 SCREENING FOR DIABETES MELLITUS: ICD-10-CM

## 2022-01-06 DIAGNOSIS — Z13.29 SCREENING FOR THYROID DISORDER: ICD-10-CM

## 2022-01-06 DIAGNOSIS — Z13.6 CARDIOVASCULAR SCREENING; LDL GOAL LESS THAN 100: ICD-10-CM

## 2022-01-06 DIAGNOSIS — N63.0 BREAST LUMP: Primary | ICD-10-CM

## 2022-01-06 LAB
CHOLEST SERPL-MCNC: 139 MG/DL
FASTING STATUS PATIENT QL REPORTED: YES
GLUCOSE BLD-MCNC: 97 MG/DL (ref 60–99)
HDLC SERPL-MCNC: 60 MG/DL
LDLC SERPL CALC-MCNC: 62 MG/DL
NONHDLC SERPL-MCNC: 79 MG/DL
TRIGL SERPL-MCNC: 85 MG/DL
TSH SERPL DL<=0.005 MIU/L-ACNC: 2.01 MU/L (ref 0.4–4)

## 2022-01-06 PROCEDURE — 36415 COLL VENOUS BLD VENIPUNCTURE: CPT

## 2022-01-06 PROCEDURE — 82947 ASSAY GLUCOSE BLOOD QUANT: CPT

## 2022-01-06 PROCEDURE — 99213 OFFICE O/P EST LOW 20 MIN: CPT | Performed by: NURSE PRACTITIONER

## 2022-01-06 PROCEDURE — 80061 LIPID PANEL: CPT

## 2022-01-06 PROCEDURE — 84443 ASSAY THYROID STIM HORMONE: CPT

## 2022-01-06 ASSESSMENT — MIFFLIN-ST. JEOR: SCORE: 1424.06

## 2022-01-06 NOTE — PROGRESS NOTES
SUBJECTIVE:                                                   Cristin Huerta is a 41 year old female who presents to clinic today for the following health issue(s):  Patient presents with:  Breast Problem: Pt has a left breast lump that she feels when she pushes down on the spot. No pain, no nipple discharge.      HPI:  Patient was here for screening mammogram, mentioned felt a possible lump in left breast.   No pain, or discharge noted.  Cycles are normal just ended her one this month.    Patient's last menstrual period was 2021 (exact date)..     Patient is sexually active, .  Using condoms for contraception.    reports that she has quit smoking. She has never used smokeless tobacco.    STD testing offered?  Declined    Health maintenance updated:  yes    Today's PHQ-2 Score:   PHQ-2 (  [employer]) 2021   Q1: Little interest or pleasure in doing things 0   Q2: Feeling down, depressed or hopeless 0   PHQ-2 Score 0   PHQ-2 Total Score (12-17 Years)- Positive if 3 or more points; Administer PHQ-A if positive 0   Q1: Little interest or pleasure in doing things -   Q2: Feeling down, depressed or hopeless -   PHQ-2 Score -     Today's PHQ-9 Score:   PHQ-9 SCORE 2021   PHQ-9 Total Score 0     Today's CATHY-7 Score:   CATHY-7 SCORE 2021   Total Score 0       Problem list and histories reviewed & adjusted, as indicated.  Additional history: as documented.    Patient Active Problem List   Diagnosis     Cervical insufficiency during pregnancy in second trimester, antepartum     Dichorionic diamniotic twin pregnancy in second trimester     Indication for care in labor or delivery     Twin pregnancy, antepartum     Pre-eclampsia, antepartum      delivery delivered     LGSIL on Pap smear of cervix     Past Surgical History:   Procedure Laterality Date     CERCLAGE CERVICAL N/A 3/5/2020    Procedure: CERCLAGE, CERVIX, VAGINAL APPROACH;  Surgeon: Brady Trujillo MD;  Location:  L[facility]D       "SECTION N/A 2020    Procedure: PRIMARY  SECTION;  Surgeon: Riana Troy MD;  Location:  [facility]     GYN SURGERY      colposcopy, LEEP, R tubal removal, D and C     HEAD & NECK SURGERY      jaw surgery     ORTHOPEDIC SURGERY      wrist      Social History     Tobacco Use     Smoking status: Former Smoker     Smokeless tobacco: Never Used     Tobacco comment: Quit    Substance Use Topics     Alcohol use: Yes     Comment: 2x a week      Problem (# of Occurrences) Relation (Name,Age of Onset)    Colon Cancer (2) Maternal Grandmother (80), Paternal Grandfather (60)    Liver Cancer (1) Maternal Grandfather    Lymphoma (1) Maternal Grandfather    No Known Problems (5) Mother, Sister, Brother, Paternal Grandmother, Other    Skin Cancer (1) Father            No current outpatient medications on file.     No current facility-administered medications for this visit.     No Known Allergies    ROS:  12 point review of systems negative other than symptoms noted below or in the HPI.  Breast: Lumps  Normal menstrual cycles      OBJECTIVE:     /72   Ht 1.715 m (5' 7.5\")   Wt 71.8 kg (158 lb 6.4 oz)   LMP 2021 (Exact Date)   Breastfeeding No   BMI 24.44 kg/m    Body mass index is 24.44 kg/m .    Exam:  Constitutional:  Appearance: Well nourished, well developed alert, in no acute distress  Neurologic:  Mental Status:  Oriented X3.  Normal strength and tone, sensory exam grossly normal, mentation intact and speech normal.    Psychiatric:  Mentation appears normal and affect normal/bright.   Right breast no masses, discharge or discoloration noted. Axilla negative.   Left breast no discharge or discoloration noted.  Lump felt right above the areolar around 12-1 o'cl position.  Moveable non tender.  Possibly fibrocystic.     In-Clinic Test Results:  Results for orders placed or performed in visit on 22 (from the past 24 hour(s))   Glucose, whole blood   Result Value Ref Range    " Glucose Whole Blood 97 60 - 99 mg/dL       ASSESSMENT/PLAN:                                                        ICD-10-CM    1. Breast lump  N63.0 MA Diagnostic Digital Bilateral         Patient to schedule diagnostic mammogram and further follow up if needed at [facility].  Return prn.    SALTY Dunn CNP [facility] [place]

## 2022-01-10 ENCOUNTER — MYC MEDICAL ADVICE (OUTPATIENT)
Dept: OBGYN | Facility: CLINIC | Age: 42
End: 2022-01-10
Payer: COMMERCIAL

## 2022-01-12 ENCOUNTER — OFFICE VISIT (OUTPATIENT)
Dept: FAMILY MEDICINE | Facility: CLINIC | Age: 42
End: 2022-01-12
Payer: COMMERCIAL

## 2022-01-12 VITALS
HEART RATE: 84 BPM | SYSTOLIC BLOOD PRESSURE: 120 MMHG | WEIGHT: 157 LBS | DIASTOLIC BLOOD PRESSURE: 80 MMHG | TEMPERATURE: 98 F | BODY MASS INDEX: 24.23 KG/M2 | OXYGEN SATURATION: 98 %

## 2022-01-12 DIAGNOSIS — R20.0 BILATERAL LEG NUMBNESS: Primary | ICD-10-CM

## 2022-01-12 DIAGNOSIS — R53.83 OTHER FATIGUE: ICD-10-CM

## 2022-01-12 LAB
ALBUMIN SERPL-MCNC: 3.7 G/DL (ref 3.4–5)
ALP SERPL-CCNC: 49 U/L (ref 40–150)
ALT SERPL W P-5'-P-CCNC: 16 U/L (ref 0–50)
ANION GAP SERPL CALCULATED.3IONS-SCNC: 2 MMOL/L (ref 3–14)
AST SERPL W P-5'-P-CCNC: 7 U/L (ref 0–45)
BILIRUB SERPL-MCNC: 0.6 MG/DL (ref 0.2–1.3)
BUN SERPL-MCNC: 11 MG/DL (ref 7–30)
CALCIUM SERPL-MCNC: 9 MG/DL (ref 8.5–10.1)
CHLORIDE BLD-SCNC: 107 MMOL/L (ref 94–109)
CO2 SERPL-SCNC: 29 MMOL/L (ref 20–32)
CREAT SERPL-MCNC: 0.69 MG/DL (ref 0.52–1.04)
ERYTHROCYTE [DISTWIDTH] IN BLOOD BY AUTOMATED COUNT: 12.4 % (ref 10–15)
FOLATE SERPL-MCNC: 16.7 NG/ML
GFR SERPL CREATININE-BSD FRML MDRD: >90 ML/MIN/1.73M2
GLUCOSE BLD-MCNC: 77 MG/DL (ref 70–99)
HBA1C MFR BLD: 5.3 % (ref 0–5.6)
HCT VFR BLD AUTO: 40.4 % (ref 35–47)
HGB BLD-MCNC: 13.3 G/DL (ref 11.7–15.7)
MCH RBC QN AUTO: 29.9 PG (ref 26.5–33)
MCHC RBC AUTO-ENTMCNC: 32.9 G/DL (ref 31.5–36.5)
MCV RBC AUTO: 91 FL (ref 78–100)
PLATELET # BLD AUTO: 256 10E3/UL (ref 150–450)
POTASSIUM BLD-SCNC: 4.1 MMOL/L (ref 3.4–5.3)
PROT SERPL-MCNC: 7 G/DL (ref 6.8–8.8)
RBC # BLD AUTO: 4.45 10E6/UL (ref 3.8–5.2)
SODIUM SERPL-SCNC: 138 MMOL/L (ref 133–144)
VIT B12 SERPL-MCNC: 386 PG/ML (ref 193–986)
WBC # BLD AUTO: 5.6 10E3/UL (ref 4–11)

## 2022-01-12 PROCEDURE — 99214 OFFICE O/P EST MOD 30 MIN: CPT | Performed by: FAMILY MEDICINE

## 2022-01-12 PROCEDURE — 85027 COMPLETE CBC AUTOMATED: CPT | Performed by: FAMILY MEDICINE

## 2022-01-12 PROCEDURE — 80053 COMPREHEN METABOLIC PANEL: CPT | Performed by: FAMILY MEDICINE

## 2022-01-12 PROCEDURE — 82746 ASSAY OF FOLIC ACID SERUM: CPT | Performed by: FAMILY MEDICINE

## 2022-01-12 PROCEDURE — 83036 HEMOGLOBIN GLYCOSYLATED A1C: CPT | Performed by: FAMILY MEDICINE

## 2022-01-12 PROCEDURE — 36415 COLL VENOUS BLD VENIPUNCTURE: CPT | Performed by: FAMILY MEDICINE

## 2022-01-12 PROCEDURE — 82607 VITAMIN B-12: CPT | Performed by: FAMILY MEDICINE

## 2022-01-12 ASSESSMENT — PAIN SCALES - GENERAL: PAINLEVEL: NO PAIN (0)

## 2022-01-12 NOTE — PROGRESS NOTES
"  Assessment & Plan     (R20.0) Bilateral leg numbness  (primary encounter diagnosis)  Comment:   Plan: CBC with platelets, Hemoglobin A1c, Vitamin         B12, Folate, Comprehensive metabolic panel            (R53.83) Other fatigue  Comment:   Plan: CBC with platelets, Hemoglobin A1c, Vitamin         B12, Folate, Comprehensive metabolic panel          Discussed possible differential diagnosis for her symptoms.  Sounds liek it her bilateral leg numbness could be related to pressure on sitting on this  the chair, bc that is what is triggering it most of the time.    talked about posture/ comfortable seat may be changing the chair, back stretches  etc to see of helps    discussed concerns with fatigue, diet/ exercise reducing stress etc   Check labs. F/u in 4-6 week if any worsening or ongoing problem  consider neurology     Check labs. refill sent.Cares and  treatment discussed. follow up if problem   Patient expressed understanding and agreement with treatment plan. All patient's questions were answered, will let me know if has more later.  Medications: Rx's: Reviewed the potential side effects/complications of medications prescribed.       Kitty Walden MD  [facility] SANA Amaral is a 41 year old who presents for the following health issues     HPI     Concern - Tingling in legs  Onset: 4 weeks   Description: \"pins and needles\" feeling in both legs , more noticeable last 2 weeks , happening  Intensity: moderate  Progression of Symptoms:  Intermittent - noticed after sitting for long periods . She does work sitting on desk most of the time. And went back to work last few months . It happens more often on work chair but may at couple of times at other time not night sx no weakness, no pain in legs. Mild lower  back pain  On and off but does not think it unusual,and does not correlate with numbness.. no visual sx or HA  associated numbness     Accompanying Signs & Symptoms: " "feelings of lightheadedness may be off balance  Once in a while \" it feels  liek hot flash feeling\" which she felt when she had I vitro fertilization etc .                                                   Cycle is regular and no changes . May be somewhat more fatigue lately but no weight change recently        ,                                          has had occasional sx but does not correlate with numbness. Some stress but nothing over whelming   Previous history of similar problem: NONE  But had rt sided sciatica kind of problem, during her pregnancy last yr but it improved after acupuncture and no recurrence of sx.  it was only rt leg and he also had pain with numbness,                                                                   so current sx feel different    Precipitating factors: wondering if it is her chair , as her sx are most noticeable after sitting on it although sx have happened other occasions  too couple of times Has started working again after a break for a while        Worsened by:   Alleviating factors:        Improved by:   Therapies tried and outcome: None        Review of Systems   Constitutional, HEENT, cardiovascular, pulmonary, GI, , musculoskeletal, neuro, skin, endocrine and psych systems are negative, except as otherwise noted.      Objective    /80   Pulse 84   Temp 98  F (36.7  C) (Tympanic)   Wt 71.2 kg (157 lb)   LMP 12/31/2021 (Exact Date)   SpO2 98%   BMI 24.23 kg/m    Body mass index is 24.23 kg/m .  Physical Exam   GENERAL: healthy, alert and no distress  EYES: Eyes grossly normal to inspection, PERRL and conjunctivae and sclerae normal  HENT: ear canals and TM's normal, nose and mouth without ulcers or lesions  NECK: no adenopathy, no asymmetry, masses, or scars and thyroid normal to palpation  RESP: lungs clear to auscultation - no rales, rhonchi or wheezes  CV: regular rate and rhythm, normal S1 S2, no S3 or S4, no murmur, click or rub,   ABDOMEN: soft, " nontender, no hepatosplenomegaly, no masses and bowel sounds normal  MS: no gross musculoskeletal defects noted, no edema  SKIN: no suspicious lesions or rashes  NEURO: Normal strength and tone, sensory exam grossly normal, mentation intact, speech normal, cranial nerves 2-12 intact, DTR's normal and symmetric , gait normal including heel/toe/tandem walking, Romberg normal  and rapid alternating movements normal  PSYCH: mentation appears normal, affect normal/bright

## 2022-01-14 ENCOUNTER — HOSPITAL ENCOUNTER (OUTPATIENT)
Dept: MAMMOGRAPHY | Facility: CLINIC | Age: 42
End: 2022-01-14
Attending: NURSE PRACTITIONER
Payer: COMMERCIAL

## 2022-01-14 DIAGNOSIS — N63.0 BREAST LUMP: ICD-10-CM

## 2022-01-14 PROCEDURE — 77062 BREAST TOMOSYNTHESIS BI: CPT

## 2022-01-14 PROCEDURE — 76642 ULTRASOUND BREAST LIMITED: CPT | Mod: LT

## 2022-06-08 ENCOUNTER — VIRTUAL VISIT (OUTPATIENT)
Dept: FAMILY MEDICINE | Facility: CLINIC | Age: 42
End: 2022-06-08
Payer: COMMERCIAL

## 2022-06-08 ENCOUNTER — NURSE TRIAGE (OUTPATIENT)
Dept: NURSING | Facility: CLINIC | Age: 42
End: 2022-06-08
Payer: COMMERCIAL

## 2022-06-08 DIAGNOSIS — U07.1 INFECTION DUE TO 2019 NOVEL CORONAVIRUS: Primary | ICD-10-CM

## 2022-06-08 PROCEDURE — 99213 OFFICE O/P EST LOW 20 MIN: CPT | Mod: 95 | Performed by: STUDENT IN AN ORGANIZED HEALTH CARE EDUCATION/TRAINING PROGRAM

## 2022-06-08 RX ORDER — GUAIFENESIN, PSEUDOEPHEDRINE HYDROCHLORIDE 600; 60 MG/1; MG/1
1 TABLET, EXTENDED RELEASE ORAL 2 TIMES DAILY PRN
Qty: 20 TABLET | Refills: 0 | Status: SHIPPED | OUTPATIENT
Start: 2022-06-08 | End: 2022-06-13

## 2022-06-08 NOTE — TELEPHONE ENCOUNTER
Patient is calling and has linger symptoms from covid.  Patient tested positive for covid Tue May 31, st.  Symptoms started on May 30th, Monday.  Today congestion and cough.  Congestion is yellow and green in color and large quantities.  Patient tested yesterday for covid and was negative.  Fatigue is slight today.  Patient is having facial pressure and slight headache.  Patient denies severe sinus pain.  Patient states that congestion has been present for greater than 10 days.  Patient is requesting a virtual visit.  Patient did have an earache and that went away.      COVID 19 Nurse Triage Plan/Patient Instructions    Please be aware that novel coronavirus (COVID-19) may be circulating in the community. If you develop symptoms such as fever, cough, or SOB or if you have concerns about the presence of another infection including coronavirus (COVID-19), please contact your health care provider or visit [email or web address].     Disposition/Instructions    Virtual Visit with provider recommended. Reference Visit Selection Guide.    Thank you for taking steps to prevent the spread of this virus.  o Limit your contact with others.  o Wear a simple mask to cover your cough.  o Wash your hands well and often.    Resources    [facility]: About COVID-19: [email or web address]    CDC: What to Do If You're Sick: www.cdc.gov/coronavirus/2019-ncov/about/steps-when-sick.html    CDC: Ending Home Isolation: www.cdc.gov/coronavirus/2019-ncov/hcp/disposition-in-home-patients.html     CDC: Caring for Someone: www.cdc.gov/coronavirus/2019-ncov/if-you-are-sick/care-for-someone.html     [facility]: Interim Guidance for Hospital Discharge to Home: www.health.[facility].[place]/diseases/coronavirus/hcp/hospdischarge.pdf    [facility] clinical trials (COVID-19 research studies): [email or web address]-trials     Below are the COVID-19 hotlines at the [place] Department of Health ([facility]). Interpreters  are available.   o For health questions: Call 737-958-4099 or 1-689.912.8277 (7 a.m. to 7 p.m.)  o For questions about schools and childcare: Call 868-832-1139 or 1-303.819.5899 (7 a.m. to 7 p.m.)                       Additional Information    Negative: Sounds like a life-threatening emergency to the triager    Negative: Difficulty breathing, and not from stuffy nose (e.g., not relieved by cleaning out the nose)    Negative: SEVERE headache and has fever    Negative: Patient sounds very sick or weak to the triager    Negative: SEVERE sinus pain    Negative: Severe headache    Negative: Redness or swelling on the cheek, forehead, or around the eye    Negative: Fever > 103 F (39.4 C)    Negative: Fever > 101 F (38.3 C) and over 60 years of age    Negative: Fever > 100.0 F (37.8 C) and has diabetes mellitus or a weak immune system (e.g., HIV positive, cancer chemotherapy, organ transplant, splenectomy, chronic steroids)    Negative: Fever > 100.0 F (37.8 C) and bedridden (e.g., nursing home patient, stroke, chronic illness, recovering from surgery)    Negative: Fever present > 3 days (72 hours)    Negative: Fever returns after gone for over 24 hours and symptoms worse or not improved    Negative: Sinus pain (not just congestion) and fever    Negative: Earache    Sinus congestion (pressure, fullness) present > 10 days    Protocols used: SINUS PAIN AND CONGESTION-A-OH

## 2022-06-08 NOTE — PROGRESS NOTES
"Cristin is a 41 year old who is being evaluated via a billable video visit.      How would you like to obtain your AVS? food.de  Video Start Time: 1:16    Assessment & Plan     Infection due to 2019 novel coronavirus  Distantly patient is slowly improving from infection.  Sinus symptoms seem to be most persistent. Patient out of the window for other oral treatments and is low risk. We will have her try decongestant along with Tylenol as needed.  She will continue to monitor symptoms and let me know if symptoms worsen or not improving.  If sinus symptoms persist or worsen I would consider treating sinusitis but symptoms appear to be related to COVID infection that is improving.    - pseudoePHEDrine-guaiFENesin (MUCINEX D)  MG 12 hr tablet  Dispense: 20 tablet; Refill: 0      Prosper Edgar MD  [facility]    Konstantin Amaral is a 41 year old who presents for the following health issues  History of Present Illness       Headaches:   Since the patient's last clinic visit, headaches are: no change  The patient is getting headaches:  The last few days  She is able to do normal daily activities when she has a migraine.  The patient is taking the following rescue/relief medications:  Ibuprofen (Advil, Motrin)   Patient states \"I get some relief\" from the rescue/relief medications.   The patient is taking the following medications to prevent migraines:  No medications to prevent migraines  In the past 4 weeks, the patient has gone to an Urgent Care or Emergency Room 0 times times due to headaches.    Reason for visit:  Symptoms after covid  Symptom onset:  1-2 weeks ago  Symptoms include:  Cough, mucus, congestion, headache, sinus pressure  Symptom intensity:  Moderate  Symptom progression:  Staying the same  Had these symptoms before:  No    She eats 2-3 servings of fruits and vegetables daily.She consumes 0 sweetened beverage(s) daily.She exercises with enough effort to increase her heart " rate 20 to 29 minutes per day.  She exercises with enough effort to increase her heart rate 3 or less days per week.   She is taking medications regularly.       Answers for HPI/ROS submitted by the patient on 6/8/2022  Headache Symptoms are: no change  How often are you getting headaches or migraines? : the last few days  Are you able to do normal daily activities when you have a migraine?: Yes  Migraine Rescue/Relief Medications:: Ibuprofen (Advil, Motrin)  Effectiveness of rescue/relief medications:: I get some relief  Migraine Preventative Medications:: no medications to prevent migraines  ER or UC Visits:: 0 times  How many servings of fruits and vegetables do you eat daily?: 2-3  On average, how many sweetened beverages do you drink each day (Examples: soda, juice, sweet tea, etc.  Do NOT count diet or artificially sweetened beverages)?: 0  How many minutes a day do you exercise enough to make your heart beat faster?: 20 to 29  How many days a week do you exercise enough to make your heart beat faster?: 3 or less  How many days per week do you miss taking your medication?: 0  What is the reason for your visit today?: Symptoms after covid  When did your symptoms begin?: 1-2 weeks ago      Patient developed symptoms on May 30 after exposures and tested positive on May 31.  Has had mild cough with congestion and yellow-green discharge.  Some ear pain for 2 days but this resolved.  Some sore throat no chest pain shortness of breath or wheezing.  Does feel stuffy into her chest and sinuses.  No other lower extremity swelling.  She is otherwise been healthy with no major past medical issues previously.  Has been vaccinated.  Was having fatigue and low energy initially but that seems to have resolved.  No fevers that she notes.  Not coughing up any junk.    Review of Systems   Constitutional, HEENT, cardiovascular, pulmonary, gi and gu systems are negative, except as otherwise noted.      Objective            Vitals:  No vitals were obtained today due to virtual visit.    Physical Exam   GENERAL: Healthy, alert and no distress  EYES: Eyes grossly normal to inspection.  No discharge or erythema, or obvious scleral/conjunctival abnormalities.  RESP: No audible wheeze,or visible cyanosis.  No visible retractions or increased work of breathing.  Some mild cough  SKIN: Visible skin clear. No significant rash, abnormal pigmentation or lesions.  NEURO: Cranial nerves grossly intact.  Mentation and speech appropriate for age.  PSYCH: Mentation appears normal, affect normal/bright, judgement and insight intact, normal speech and appearance well-groomed.          Video-Visit Details    Type of service:  Video Visit    Video End Time:1:32 PM    Originating Location (pt. Location): Home    Distant Location (provider location):  [facility]     Platform used for Video Visit: [employer]    What are your symptoms?: cough, mucus, congestion, headache, sinus pressure  How would you describe these symptoms?: Moderate  Are your symptoms:: Staying the same  Have you had these symptoms before?: No

## 2022-06-08 NOTE — PROGRESS NOTES
Answers for HPI/ROS submitted by the patient on 6/8/2022  Headache Symptoms are: no change  How often are you getting headaches or migraines? : the last few days  Are you able to do normal daily activities when you have a migraine?: Yes  Migraine Rescue/Relief Medications:: Ibuprofen (Advil, Motrin)  Effectiveness of rescue/relief medications:: I get some relief  Migraine Preventative Medications:: no medications to prevent migraines  ER or UC Visits:: 0 times  How many servings of fruits and vegetables do you eat daily?: 2-3  On average, how many sweetened beverages do you drink each day (Examples: soda, juice, sweet tea, etc.  Do NOT count diet or artificially sweetened beverages)?: 0  How many minutes a day do you exercise enough to make your heart beat faster?: 20 to 29  How many days a week do you exercise enough to make your heart beat faster?: 3 or less  How many days per week do you miss taking your medication?: 0  What is the reason for your visit today?: Symptoms after covid  When did your symptoms begin?: 1-2 weeks ago  What are your symptoms?: cough, mucus, congestion, headache, sinus pressure  How would you describe these symptoms?: Moderate  Are your symptoms:: Staying the same  Have you had these symptoms before?: No

## 2022-10-19 NOTE — PROGRESS NOTES
[facility]  OB/GYN Clinic Note    SUBJECTIVE:                                                   Cristin Huerta is a 41 year old  female who presents to clinic today for the following health issue(s):  Patient presents with:  Pelvic Pain: C/o pain during intercourse, ongoing for about a year. Denies any spotting.    HPI:  Patient presents to discuss new symptom of painful intercourse that has been progressively more noticeable and bothersome. This started around the time of initiating intercourse after her delivery in . She had a CS for di/di twins. She notes that pain doesn't occur with each encounter for intercourse, but does note that certain positions are painful, particularly intercourse from behind. Repositioning helps with discomfort. Pain feels deep inside. Also has noted more cramping with cycles. Prior to pregnancy had Mirena. Periods are regular, every 23-24 days. Period started today. Normal BMs. Occasional constipation. Urinating is normal.     Patient's last menstrual period was 10/20/2022.  Patient is sexually active, .  Using vasectomy for contraception.    reports that she has quit smoking. She has never used smokeless tobacco.  STD testing offered?  Declined  Health maintenance updated:  yes      Problem list and histories reviewed & adjusted, as indicated.  Additional history: as documented.    Patient Active Problem List   Diagnosis     Cervical insufficiency during pregnancy in second trimester, antepartum     Dichorionic diamniotic twin pregnancy in second trimester     Indication for care in labor or delivery     Twin pregnancy, antepartum     Pre-eclampsia, antepartum      delivery delivered     LGSIL on Pap smear of cervix     Past Surgical History:   Procedure Laterality Date     CERCLAGE CERVICAL N/A 3/5/2020    Procedure: CERCLAGE, CERVIX, VAGINAL APPROACH;  Surgeon: Brady Trujillo MD;  Location: [facility]      SECTION N/A 2020     "Procedure: PRIMARY  SECTION;  Surgeon: Riana Troy MD;  Location:  L+D     GYN SURGERY      colposcopy, LEEP, R tubal removal, D and C     HEAD & NECK SURGERY      jaw surgery     ORTHOPEDIC SURGERY      wrist      Social History     Tobacco Use     Smoking status: Former     Smokeless tobacco: Never     Tobacco comments:     Quit    Substance Use Topics     Alcohol use: Yes     Comment: 2x a week      Problem (# of Occurrences) Relation (Name,Age of Onset)    Colon Cancer (2) Maternal Grandmother (80), Paternal Grandfather (60)    Liver Cancer (1) Maternal Grandfather    Lymphoma (1) Maternal Grandfather    Skin Cancer (1) Father    No Known Problems (5) Mother, Sister, Brother, Paternal Grandmother, Other            No current outpatient medications on file.     No current facility-administered medications for this visit.     No Known Allergies      OBJECTIVE:     /74   Ht 1.715 m (5' 7.5\")   Wt 75.3 kg (166 lb)   LMP 10/20/2022   BMI 25.62 kg/m    Body mass index is 25.62 kg/m .    Exam:  Constitutional:  Appearance: Well nourished, well developed alert, in no acute distress     In-Clinic Test Results:  No results found for this or any previous visit (from the past 24 hour(s)).    ASSESSMENT/PLAN:                                                        ICD-10-CM    1. Pelvic pain in female  R10.2 US Transvaginal Pelvic Non-OB        Cristin Huerta is a 41 year old  with progressively worsening pelvic pain with intercourse in certain positions. Discussed etiologies including possible structural, or pelvic floor dysfunction related to recent pregnancy. Patient will return for US and pelvic exam. Briefly discussed benefits of pelvic floor PT in improving symptoms of pain with intercourse. Plan to follow-up to discuss US and management options.     Adilia Alvarado MD, [facility] [place]  10/20/22  "

## 2022-10-20 ENCOUNTER — OFFICE VISIT (OUTPATIENT)
Dept: OBGYN | Facility: CLINIC | Age: 42
End: 2022-10-20
Payer: COMMERCIAL

## 2022-10-20 VITALS
BODY MASS INDEX: 25.16 KG/M2 | WEIGHT: 166 LBS | HEIGHT: 68 IN | DIASTOLIC BLOOD PRESSURE: 74 MMHG | SYSTOLIC BLOOD PRESSURE: 124 MMHG

## 2022-10-20 DIAGNOSIS — R10.2 PELVIC PAIN IN FEMALE: Primary | ICD-10-CM

## 2022-10-20 PROCEDURE — 99213 OFFICE O/P EST LOW 20 MIN: CPT | Performed by: STUDENT IN AN ORGANIZED HEALTH CARE EDUCATION/TRAINING PROGRAM

## 2022-11-19 ENCOUNTER — HEALTH MAINTENANCE LETTER (OUTPATIENT)
Age: 42
End: 2022-11-19

## 2022-11-22 NOTE — PROGRESS NOTES
"[facility]  OB/GYN Clinic Note    SUBJECTIVE:                                                   Cristin Huerta is a 41 year old  female who presents to clinic today for the following health issue(s):  Patient presents with:  Follow Up: US for pelvic pain/ pain with intercourse  Additional information: follow up US - pelvic pain/pain with intercourse    HPI:  Patient presents to follow-up on US for pelvic pain with intercourse. Symptoms are about the same, not improving, but not worsening. Has history of ruptured ovarian cyst. Used OCP in the past and Mirena. Had difficulty with Mirena removal.     Patient's last menstrual period was 2022 (exact date)..   Patient is sexually active, .  Using vasectomy for contraception.    reports that she has quit smoking. She has never used smokeless tobacco.  STD testing offered?  Declined  Health maintenance updated:  yes    Today's PHQ-2 Score:   PHQ-2 (  [employer]) 2022   Q1: Little interest or pleasure in doing things 0   Q2: Feeling down, depressed or hopeless 0   PHQ-2 Score 0   PHQ-2 Total Score (12-17 Years)- Positive if 3 or more points; Administer PHQ-A if positive -   Q1: Little interest or pleasure in doing things Not at all   Q2: Feeling down, depressed or hopeless Not at all   PHQ-2 Score 0     Today's PHQ-9 Score:   PHQ-9 SCORE 2021   PHQ-9 Total Score 0     Today's CATHY-7 Score:   CATHY-7 SCORE 2021   Total Score 0     Problem list and histories reviewed & adjusted, as indicated.  Additional history: as documented.      OBJECTIVE:     /68   Ht 1.715 m (5' 7.5\")   Wt 76.4 kg (168 lb 6.4 oz)   LMP 2022 (Exact Date)   BMI 25.99 kg/m    Body mass index is 25.99 kg/m .    Exam:  Constitutional:  Appearance: Well nourished, well developed alert, in no acute distress     In-Clinic Test Results:  No results found for this or any previous visit (from the past 24 hour(s)).    ASSESSMENT/PLAN:               "                                          ICD-10-CM    1. Cyst of right ovary  N83.201 etonogestrel-ethinyl estradiol (NUVARING) 0.12-0.015 MG/24HR vaginal ring      2. Pelvic pain in female  R10.2 Physical Therapy Referral        Cristin Huerta is a 42 year old  with pelvic pain with intercourse. US images reviewed. Right ruptured ovarian cyst with pelvic free fluid noted. Discussed cyst formation, rupture, free fluid can lead to pelvic pain. Has history of ruptured cyst. Discussed cOCP to help reduce formation of cysts, ultimately help with pelvic pain related to free/fluid cyst rupture. Difficulty with daily pill- prefers Nuvaring. One year Rx provided. Discussed option of continuous use.   Discussed pelvic floor PT as a adjunct, as I suspect there is a component of pelvic floor dysfunction also. Referral provided. Return if symptoms are not improving or worsening, and in one year for refill.     Adilia Alvarado MD, MHS  [facility] [place]  22

## 2022-11-29 ENCOUNTER — ANCILLARY PROCEDURE (OUTPATIENT)
Dept: ULTRASOUND IMAGING | Facility: CLINIC | Age: 42
End: 2022-11-29
Attending: STUDENT IN AN ORGANIZED HEALTH CARE EDUCATION/TRAINING PROGRAM
Payer: COMMERCIAL

## 2022-11-29 ENCOUNTER — OFFICE VISIT (OUTPATIENT)
Dept: OBGYN | Facility: CLINIC | Age: 42
End: 2022-11-29
Attending: STUDENT IN AN ORGANIZED HEALTH CARE EDUCATION/TRAINING PROGRAM
Payer: COMMERCIAL

## 2022-11-29 VITALS
HEIGHT: 68 IN | BODY MASS INDEX: 25.52 KG/M2 | SYSTOLIC BLOOD PRESSURE: 126 MMHG | WEIGHT: 168.4 LBS | DIASTOLIC BLOOD PRESSURE: 68 MMHG

## 2022-11-29 DIAGNOSIS — R10.2 PELVIC PAIN IN FEMALE: ICD-10-CM

## 2022-11-29 DIAGNOSIS — N83.201 CYST OF RIGHT OVARY: Primary | ICD-10-CM

## 2022-11-29 PROCEDURE — 99213 OFFICE O/P EST LOW 20 MIN: CPT | Performed by: STUDENT IN AN ORGANIZED HEALTH CARE EDUCATION/TRAINING PROGRAM

## 2022-11-29 PROCEDURE — 76830 TRANSVAGINAL US NON-OB: CPT | Performed by: OBSTETRICS & GYNECOLOGY

## 2022-11-29 RX ORDER — ETONOGESTREL AND ETHINYL ESTRADIOL VAGINAL RING .015; .12 MG/D; MG/D
1 RING VAGINAL
Qty: 3 EACH | Refills: 3 | Status: SHIPPED | OUTPATIENT
Start: 2022-11-29 | End: 2023-03-22

## 2023-03-09 ENCOUNTER — MYC MEDICAL ADVICE (OUTPATIENT)
Dept: OBGYN | Facility: CLINIC | Age: 43
End: 2023-03-09
Payer: COMMERCIAL

## 2023-03-13 ENCOUNTER — ANCILLARY PROCEDURE (OUTPATIENT)
Dept: MAMMOGRAPHY | Facility: CLINIC | Age: 43
End: 2023-03-13
Payer: COMMERCIAL

## 2023-03-13 DIAGNOSIS — Z12.31 VISIT FOR SCREENING MAMMOGRAM: ICD-10-CM

## 2023-03-13 PROCEDURE — 77067 SCR MAMMO BI INCL CAD: CPT | Performed by: STUDENT IN AN ORGANIZED HEALTH CARE EDUCATION/TRAINING PROGRAM

## 2023-03-13 PROCEDURE — 77063 BREAST TOMOSYNTHESIS BI: CPT | Performed by: STUDENT IN AN ORGANIZED HEALTH CARE EDUCATION/TRAINING PROGRAM

## 2023-03-22 ENCOUNTER — OFFICE VISIT (OUTPATIENT)
Dept: FAMILY MEDICINE | Facility: CLINIC | Age: 43
End: 2023-03-22
Payer: COMMERCIAL

## 2023-03-22 VITALS
WEIGHT: 168 LBS | SYSTOLIC BLOOD PRESSURE: 135 MMHG | RESPIRATION RATE: 20 BRPM | BODY MASS INDEX: 26.37 KG/M2 | OXYGEN SATURATION: 99 % | DIASTOLIC BLOOD PRESSURE: 85 MMHG | HEART RATE: 75 BPM | HEIGHT: 67 IN | TEMPERATURE: 98.4 F

## 2023-03-22 DIAGNOSIS — Z13.1 SCREENING FOR DIABETES MELLITUS: ICD-10-CM

## 2023-03-22 DIAGNOSIS — Z13.220 SCREENING FOR HYPERLIPIDEMIA: ICD-10-CM

## 2023-03-22 DIAGNOSIS — Z12.11 COLON CANCER SCREENING: ICD-10-CM

## 2023-03-22 DIAGNOSIS — Z00.00 ROUTINE GENERAL MEDICAL EXAMINATION AT A HEALTH CARE FACILITY: Primary | ICD-10-CM

## 2023-03-22 PROCEDURE — 99396 PREV VISIT EST AGE 40-64: CPT | Performed by: INTERNAL MEDICINE

## 2023-03-22 ASSESSMENT — ENCOUNTER SYMPTOMS
NERVOUS/ANXIOUS: 0
FEVER: 0
DIARRHEA: 0
MYALGIAS: 0
WEAKNESS: 0
ABDOMINAL PAIN: 0
HEMATOCHEZIA: 0
SHORTNESS OF BREATH: 0
BREAST MASS: 0
HEADACHES: 0
COUGH: 0
PARESTHESIAS: 0
FREQUENCY: 0
DIZZINESS: 0
EYE PAIN: 0
HEMATURIA: 0
ARTHRALGIAS: 0
NAUSEA: 0
CHILLS: 0
HEARTBURN: 0
SORE THROAT: 0
CONSTIPATION: 0
DYSURIA: 0
PALPITATIONS: 0
JOINT SWELLING: 0

## 2023-03-22 ASSESSMENT — PAIN SCALES - GENERAL: PAINLEVEL: NO PAIN (0)

## 2023-03-22 NOTE — PROGRESS NOTES
SUBJECTIVE:   CC: Crisitn is an 42 year old who presents for preventive health visit.   Additional Questions 3/22/2023   Roomed by Jeanne MONTELONGO   Accompanied by Self     Patient has been advised of split billing requirements and indicates understanding: Yes  Healthy Habits:     Getting at least 3 servings of Calcium per day:  Yes    Bi-annual eye exam:  Yes    Dental care twice a year:  Yes    Sleep apnea or symptoms of sleep apnea:  None    Diet:  Regular (no restrictions)    Frequency of exercise:  1 day/week    Duration of exercise:  15-30 minutes    Taking medications regularly:  Not Applicable    Medication side effects:  Not applicable    PHQ-2 Total Score: 0    Additional concerns today:  No              Today's PHQ-2 Score:   PHQ-2 ( 1999 Pfizer) 3/22/2023   Q1: Little interest or pleasure in doing things 0   Q2: Feeling down, depressed or hopeless 0   PHQ-2 Score 0   PHQ-2 Total Score (12-17 Years)- Positive if 3 or more points; Administer PHQ-A if positive -   Q1: Little interest or pleasure in doing things Not at all   Q2: Feeling down, depressed or hopeless Not at all   PHQ-2 Score 0       Have you ever done Advance Care Planning? (For example, a Health Directive, POLST, or a discussion with a medical provider or your loved ones about your wishes): No, advance care planning information given to patient to review.  Patient declined advance care planning discussion at this time.    Social History     Tobacco Use     Smoking status: Former     Smokeless tobacco: Never     Tobacco comments:     Quit 2005   Substance Use Topics     Alcohol use: Yes     Comment: 2x a week         Alcohol Use 3/22/2023   Prescreen: >3 drinks/day or >7 drinks/week? No   No flowsheet data found.  Reviewed orders with patient.  Reviewed health maintenance and updated orders accordingly - Yes  Lab work is in process    Breast Cancer Screening:    FHS-7:   Breast CA Risk Assessment (FHS-7) 1/6/2022 1/14/2022 3/13/2023 3/22/2023   Did  any of your first-degree relatives have breast or ovarian cancer? No No No No   Did any of your relatives have bilateral breast cancer? No No No No   Did any man in your family have breast cancer? No No No No   Did any woman in your family have breast and ovarian cancer? No No No No   Did any woman in your family have breast cancer before age 50 y? No No No No   Do you have 2 or more relatives with breast and/or ovarian cancer? No No No No   Do you have 2 or more relatives with breast and/or bowel cancer? Yes Yes Yes Yes     click delete button to remove this line now  Mammogram Screening - Offered annual screening and updated Health Maintenance for [place] plan based on risk factor consideration    Pertinent mammograms are reviewed under the imaging tab.    History of abnormal Pap smear: NO - age 30-65 PAP every 5 years with negative HPV co-testing recommended  PAP / HPV Latest Ref Rng & Units 7/30/2021   PAP   Negative for Intraepithelial Lesion or Malignancy (NILM)   HPV16 Negative Negative   HPV18 Negative Negative   HRHPV Negative Negative     Reviewed and updated as needed this visit by clinical staff   Tobacco  Allergies  Meds              Reviewed and updated as needed this visit by Provider                     Review of Systems   Constitutional: Negative for chills and fever.   HENT: Negative for congestion, ear pain, hearing loss and sore throat.    Eyes: Negative for pain and visual disturbance.   Respiratory: Negative for cough and shortness of breath.    Cardiovascular: Negative for chest pain, palpitations and peripheral edema.   Gastrointestinal: Negative for abdominal pain, constipation, diarrhea, heartburn, hematochezia and nausea.   Breasts:  Positive for tenderness. Negative for breast mass and discharge.   Genitourinary: Negative for dysuria, frequency, genital sores, hematuria, pelvic pain, urgency, vaginal bleeding and vaginal discharge.   Musculoskeletal: Negative for arthralgias, joint  "swelling and myalgias.   Skin: Negative for rash.   Neurological: Negative for dizziness, weakness, headaches and paresthesias.   Psychiatric/Behavioral: Negative for mood changes. The patient is not nervous/anxious.           OBJECTIVE:   BP (!) 147/90 (BP Location: Right arm, Patient Position: Sitting, Cuff Size: Adult Regular)   Pulse 75   Temp 98.4  F (36.9  C) (Tympanic)   Resp 20   Ht 1.702 m (5' 7\")   Wt 76.2 kg (168 lb)   LMP 03/14/2023 (Exact Date)   SpO2 99%   BMI 26.31 kg/m    Physical Exam  GENERAL: healthy, alert and no distress  EYES: Eyes grossly normal to inspection, PERRL and conjunctivae and sclerae normal  HENT: ear canals and TM's normal, nose and mouth without ulcers or lesions  NECK: no adenopathy, no asymmetry, masses, or scars and thyroid normal to palpation  RESP: lungs clear to auscultation - no rales, rhonchi or wheezes  CV: regular rate and rhythm, normal S1 S2, no S3 or S4, no murmur, click or rub, no peripheral edema and peripheral pulses strong  ABDOMEN: soft, nontender, no hepatosplenomegaly, no masses and bowel sounds normal  MS: no gross musculoskeletal defects noted, no edema  SKIN: no suspicious lesions or rashes  NEURO: Normal strength and tone, mentation intact and speech normal  PSYCH: mentation appears normal, affect normal/bright    Diagnostic Test Results:  Labs reviewed in Epic    ASSESSMENT/PLAN:   (Z00.00) Routine general medical examination at a health care facility  (primary encounter diagnosis)  Comment:   Plan: CBC with platelets and differential, Basic         metabolic panel  (Ca, Cl, CO2, Creat, Gluc, K,         Na, BUN)        She does have a history of anemia when she was pregnant  She is up-to-date with all the vaccines except hepatitis B which we discussed today  Up-to-date with mammograms and Pap smear    (Z13.1) Screening for diabetes mellitus  Comment:   Plan: Hemoglobin A1c            (Z13.220) Screening for hyperlipidemia  Comment:   Plan: Lipid " "panel reflex to direct LDL Fasting            (Z12.11) Colon cancer screening  Comment: She has a family history of polyps and also she has a colon cancer history both on paternal and maternal side  She certainly there is early colonoscopy because of the above history  Plan: Colonoscopy Screening  Referral              Patient has been advised of split billing requirements and indicates understanding: No      COUNSELING:  Reviewed preventive health counseling, as reflected in patient instructions       Regular exercise       Healthy diet/nutrition       Vision screening       Colorectal Cancer Screening      BMI:   Estimated body mass index is 26.31 kg/m  as calculated from the following:    Height as of this encounter: 1.702 m (5' 7\").    Weight as of this encounter: 76.2 kg (168 lb).   Weight management plan: Discussed healthy diet and exercise guidelines      She reports that she has quit smoking. She has never used smokeless tobacco.          Flakito Khan MD  [facility]  " No apparent complications or complaints reguarding anesthesia care at this time

## 2023-04-11 ENCOUNTER — LAB (OUTPATIENT)
Dept: LAB | Facility: CLINIC | Age: 43
End: 2023-04-11
Payer: COMMERCIAL

## 2023-04-11 DIAGNOSIS — Z13.1 SCREENING FOR DIABETES MELLITUS: ICD-10-CM

## 2023-04-11 DIAGNOSIS — Z00.00 ROUTINE GENERAL MEDICAL EXAMINATION AT A HEALTH CARE FACILITY: ICD-10-CM

## 2023-04-11 DIAGNOSIS — Z13.220 SCREENING FOR HYPERLIPIDEMIA: ICD-10-CM

## 2023-04-11 LAB
ANION GAP SERPL CALCULATED.3IONS-SCNC: 3 MMOL/L (ref 3–14)
BASOPHILS # BLD AUTO: 0 10E3/UL (ref 0–0.2)
BASOPHILS NFR BLD AUTO: 0 %
BUN SERPL-MCNC: 12 MG/DL (ref 7–30)
CALCIUM SERPL-MCNC: 8.4 MG/DL (ref 8.5–10.1)
CHLORIDE BLD-SCNC: 107 MMOL/L (ref 94–109)
CHOLEST SERPL-MCNC: 118 MG/DL
CO2 SERPL-SCNC: 25 MMOL/L (ref 20–32)
CREAT SERPL-MCNC: 0.58 MG/DL (ref 0.52–1.04)
EOSINOPHIL # BLD AUTO: 0.2 10E3/UL (ref 0–0.7)
EOSINOPHIL NFR BLD AUTO: 2 %
ERYTHROCYTE [DISTWIDTH] IN BLOOD BY AUTOMATED COUNT: 12.9 % (ref 10–15)
FASTING STATUS PATIENT QL REPORTED: YES
GFR SERPL CREATININE-BSD FRML MDRD: >90 ML/MIN/1.73M2
GLUCOSE BLD-MCNC: 97 MG/DL (ref 70–99)
HBA1C MFR BLD: 5.5 % (ref 0–5.6)
HCT VFR BLD AUTO: 38.2 % (ref 35–47)
HDLC SERPL-MCNC: 57 MG/DL
HGB BLD-MCNC: 12.4 G/DL (ref 11.7–15.7)
IMM GRANULOCYTES # BLD: 0 10E3/UL
IMM GRANULOCYTES NFR BLD: 0 %
LDLC SERPL CALC-MCNC: 52 MG/DL
LYMPHOCYTES # BLD AUTO: 1.7 10E3/UL (ref 0.8–5.3)
LYMPHOCYTES NFR BLD AUTO: 23 %
MCH RBC QN AUTO: 29.8 PG (ref 26.5–33)
MCHC RBC AUTO-ENTMCNC: 32.5 G/DL (ref 31.5–36.5)
MCV RBC AUTO: 92 FL (ref 78–100)
MONOCYTES # BLD AUTO: 0.4 10E3/UL (ref 0–1.3)
MONOCYTES NFR BLD AUTO: 5 %
NEUTROPHILS # BLD AUTO: 5.1 10E3/UL (ref 1.6–8.3)
NEUTROPHILS NFR BLD AUTO: 69 %
NONHDLC SERPL-MCNC: 61 MG/DL
PLATELET # BLD AUTO: 264 10E3/UL (ref 150–450)
POTASSIUM BLD-SCNC: 4.3 MMOL/L (ref 3.4–5.3)
RBC # BLD AUTO: 4.16 10E6/UL (ref 3.8–5.2)
SODIUM SERPL-SCNC: 135 MMOL/L (ref 133–144)
TRIGL SERPL-MCNC: 46 MG/DL
WBC # BLD AUTO: 7.3 10E3/UL (ref 4–11)

## 2023-04-11 PROCEDURE — 80061 LIPID PANEL: CPT

## 2023-04-11 PROCEDURE — 80048 BASIC METABOLIC PNL TOTAL CA: CPT

## 2023-04-11 PROCEDURE — 83036 HEMOGLOBIN GLYCOSYLATED A1C: CPT

## 2023-04-11 PROCEDURE — 85025 COMPLETE CBC W/AUTO DIFF WBC: CPT

## 2023-04-11 PROCEDURE — 36415 COLL VENOUS BLD VENIPUNCTURE: CPT

## 2023-04-13 ENCOUNTER — TELEPHONE (OUTPATIENT)
Dept: GASTROENTEROLOGY | Facility: CLINIC | Age: 43
End: 2023-04-13
Payer: COMMERCIAL

## 2023-04-13 NOTE — TELEPHONE ENCOUNTER
Screening Questions  BLUE  KIND OF PREP RED  LOCATION [review exclusion criteria] GREEN  SEDATION TYPE        Y Are you active on mychart?       Flakito Khan Ordering/Referring Provider?        [employer]BS What type of coverage do you have?      N Have you had a positive covid test in the last 14 days?   26.3 1. BMI  [BMI 40+ - review exclusion criteria]    Y  2. Are you able to give consent for your medical care? [IF NO,RN REVIEW]          N  3. Are you taking any prescription pain medications on a routine schedule   (ex narcotics: oxycodone, roxicodone, oxycontin,  and percocet)? [RN Review]        N  3a. EXTENDED PREP What kind of prescription?     N 4. Do you have any chemical dependencies such as alcohol, street drugs, or methadone?        **If yes 3- 5 , please schedule with MAC sedation.**          IF YES TO ANY 6 - 10 - HOSPITAL SETTING ONLY.     N 6.   Do you need assistance transferring?     N 7.   Have you had a heart or lung transplant?    N 8.   Are you currently on dialysis?   N 9.   Do you use daily home oxygen?   N 10. Do you take nitroglycerin?   10a. N If yes, how often?     11. [FEMALES]  N Are you currently pregnant?    11a. N If yes, how many weeks? [ Greater than 12 weeks, OR NEEDED]    N 12. Do you have Pulmonary Hypertension? *NEED PAC APPT AT [facility] w/ MAC*     N 13. [review exclusion criteria]  Do you have any implantable devices in your body (pacemaker, defib, LVAD)?    N 14. In the past 6 months, have you had any heart related issues including cardiomyopathy or heart attack?     14a. N If yes, did it require cardiac stenting if so when?     N 15. Have you had a stroke or Transient ischemic attack (TIA - aka  mini stroke ) within 6 months?      N 16. Do you have mod to severe Obstructive Sleep Apnea?  [Hospital only]    N 17. Do you have SEVERE AND UNCONTROLLED asthma? *NEED PAC APPT AT [facility] w/MAC*     18. Are you currently taking any blood thinners?     18a. No. Continue to 19.   18b.  "Yes/no Blood Thinner: No [CONTINUE TO #19]    N 19. Do you take the medication Phentermine?    19a. If yes, \"Hold for 7 days before procedure.  Please consult your prescribing provider if you have questions about holding this medication.\"     N  20. Do you have chronic kidney disease?      N  21. Do you have a diagnosis of diabetes?     N  22. On a regular basis do you go 3-5 days between bowel movements?     N 23. Preferred LOCAL Pharmacy for Pre Prescription    [ LIST ONLY ONE PHARMACY]     [facility] [address] [address]        - CLOSING REMINDERS -    Informed patient they will need an adult    Cannot take any type of public or medical transportation alone    Conscious Sedation- Needs  for 6 hours after the procedure       MAC/General-Needs  for 24 hours after procedure    Pre-Procedure Covid test to be completed [[facility] PCR Testing Required]    Confirmed Nurse will call to complete assessment       - SCHEDULING DETAILS -  N Hospital Setting Required? If yes, what is the exclusion?   JESSICA  Surgeon    6/13/23  Date of Procedure  Lower Endoscopy [Colonoscopy]  [facility]ALAX GATORADE WITHOUT MAGNEISUM CITRATE Which Colonoscopy Prep was Sent?     MODERATE Sedation Type     N PAC / Pre-op Required                 "

## 2023-04-27 ENCOUNTER — MYC MEDICAL ADVICE (OUTPATIENT)
Dept: FAMILY MEDICINE | Facility: CLINIC | Age: 43
End: 2023-04-27
Payer: COMMERCIAL

## 2023-05-08 ENCOUNTER — VIRTUAL VISIT (OUTPATIENT)
Dept: FAMILY MEDICINE | Facility: CLINIC | Age: 43
End: 2023-05-08
Payer: COMMERCIAL

## 2023-05-08 DIAGNOSIS — R20.2 NUMBNESS AND TINGLING OF LEFT ARM AND LEG: Primary | ICD-10-CM

## 2023-05-08 DIAGNOSIS — R20.0 NUMBNESS AND TINGLING OF LEFT ARM AND LEG: Primary | ICD-10-CM

## 2023-05-08 PROCEDURE — 99207 PR NO CHARGE LOS: CPT | Mod: VID | Performed by: INTERNAL MEDICINE

## 2023-05-08 NOTE — PROGRESS NOTES
Cristin is a 42 year old who is being evaluated via a billable video visit.      How would you like to obtain your AVS? MyChart  If the video visit is dropped, the invitation should be resent by: Text to cell phone: [phone]  Will anyone else be joining your video visit? No          Assessment & Plan     Numbness and tingling of left arm and leg  Complaining of tingling and numbness in left arm and left leg  Feels like electrical sensations although not as bad as static electricity  Comes and goes  Mainly in the left side of the body  No visual problems  No headaches  The sensation is also normal happening on her left shoulder blade  She needs a physical examination and work-up for the sensations  No history of any neck pain or back pain  Might need an MRI  To accomplish all these she needs an in person visit  I will arrange that for her  Because she is going to be coming to the clinic this visit will not be charged        20 minutes spent by me on the date of the encounter doing chart review, history and exam, documentation and further activities per the note  \         Flakito Khan MD  [facility]   Cristin is a 42 year old, presenting for the following health issues:  No chief complaint on file.        5/08/2023      AM   Additional Questions   Roomed by Vani   Accompanied by      History of Present Illness       Reason for visit:  Tingling. Most often in shoulder.  Symptom onset:  More than a month  Symptoms include:  Recurring tingling in body parts. Recurs most often in left shoulder  Symptom intensity:  Moderate  Symptom progression:  Worsening  Had these symptoms before:  No  What makes it worse:  No  What makes it better:  No    She eats 2-3 servings of fruits and vegetables daily.She consumes 0 sweetened beverage(s) daily.She exercises with enough effort to increase her heart rate 20 to 29 minutes per day.  She exercises with enough effort to increase her heart rate 3  or less days per week.   She is taking medications regularly.               Review of Systems   Constitutional, HEENT, cardiovascular, pulmonary, gi and gu systems are negative, except as otherwise noted.      Objective           Vitals:  No vitals were obtained today due to virtual visit.    Physical Exam   GENERAL: Healthy, alert and no distress  EYES: Eyes grossly normal to inspection.  No discharge or erythema, or obvious scleral/conjunctival abnormalities.  RESP: No audible wheeze, cough, or visible cyanosis.  No visible retractions or increased work of breathing.    SKIN: Visible skin clear. No significant rash, abnormal pigmentation or lesions.  NEURO: Cranial nerves grossly intact.  Mentation and speech appropriate for age.  PSYCH: Mentation appears normal, affect normal/bright, judgement and insight intact, normal speech and appearance well-groomed.                Video-Visit Details    Type of service:  Video Visit     Originating Location (pt. Location): Home    Distant Location (provider location):  Off-site  Platform used for Video Visit: Livia

## 2023-05-11 ENCOUNTER — OFFICE VISIT (OUTPATIENT)
Dept: FAMILY MEDICINE | Facility: CLINIC | Age: 43
End: 2023-05-11
Payer: COMMERCIAL

## 2023-05-11 VITALS
WEIGHT: 171 LBS | TEMPERATURE: 97.8 F | HEART RATE: 78 BPM | OXYGEN SATURATION: 100 % | BODY MASS INDEX: 25.91 KG/M2 | SYSTOLIC BLOOD PRESSURE: 130 MMHG | HEIGHT: 68 IN | DIASTOLIC BLOOD PRESSURE: 82 MMHG | RESPIRATION RATE: 16 BRPM

## 2023-05-11 DIAGNOSIS — R20.0 LEFT ARM NUMBNESS: Primary | ICD-10-CM

## 2023-05-11 DIAGNOSIS — R53.83 TIRED: ICD-10-CM

## 2023-05-11 DIAGNOSIS — R20.2 PARESTHESIAS: ICD-10-CM

## 2023-05-11 LAB
FOLATE SERPL-MCNC: 13.8 NG/ML (ref 4.6–34.8)
VIT B12 SERPL-MCNC: 366 PG/ML (ref 232–1245)

## 2023-05-11 PROCEDURE — 82746 ASSAY OF FOLIC ACID SERUM: CPT | Performed by: INTERNAL MEDICINE

## 2023-05-11 PROCEDURE — 99214 OFFICE O/P EST MOD 30 MIN: CPT | Performed by: INTERNAL MEDICINE

## 2023-05-11 PROCEDURE — 36415 COLL VENOUS BLD VENIPUNCTURE: CPT | Performed by: INTERNAL MEDICINE

## 2023-05-11 PROCEDURE — 84443 ASSAY THYROID STIM HORMONE: CPT | Performed by: INTERNAL MEDICINE

## 2023-05-11 PROCEDURE — 82607 VITAMIN B-12: CPT | Performed by: INTERNAL MEDICINE

## 2023-05-11 RX ORDER — METHYLPREDNISOLONE 4 MG
TABLET, DOSE PACK ORAL
COMMUNITY
Start: 2023-01-31 | End: 2023-05-11

## 2023-05-11 ASSESSMENT — PAIN SCALES - GENERAL: PAINLEVEL: NO PAIN (0)

## 2023-05-11 NOTE — PROGRESS NOTES
Assessment & Plan     Left arm numbness  Ongoing left arm numbness  Came on in the last 2 weeks  She has feeling of tingling sensation in the left shoulder going down the left arm  No headache but feels foggy and tired  She also has some paresthesias in the left leg  Again no weakness  No history of any head injury  However recently she woke up with neck pain and was seen in the orthopedic urgent care  Because of her symptoms with just multiple and spaced out in time and space I am concerned about possible MS  We will get MRI of both brain and spinal cord with and without contrast  - MR Brain w/o & w Contrast; Future  - MR Cervical Spine w/o & w Contrast; Future    Paresthesias  As above we need to rule out any demyelinating problem however the other things we need to rule out or B12 or folate deficiency  - MR Brain w/o & w Contrast; Future  - MR Cervical Spine w/o & w Contrast; Future  - Vitamin B12; Future  - Folate; Future  - Vitamin B12  - Folate    Tired  Her recent CBC was normal  - TSH with free T4 reflex; Future  - TSH with free T4 reflex      25 minutes spent by me on the date of the encounter doing chart review, history and exam, documentation and further activities per the note           Flakito Khan MD  [facility] BILLY Amaral is a 42 year old, presenting for the following health issues:  RECHECK (Phone visit for tingling in body )        5/11/2023     1:35 PM   Additional Questions   Roomed by LIAM LANDEROS     Concern - tingling   Onset: started 6 weeks ago  Description: tingling in shoulder  And in other limbs mainly on left side   Intensity: moderate  Progression of Symptoms:  worsening  Accompanying Signs & Symptoms: nothing really just weird  Previous history of similar problem: No   Precipitating factors:        Worsened by: nothing   Alleviating factors:        Improved by: nothing just has to run is course   Therapies tried and outcome:  "None          Review of Systems   Constitutional, HEENT, cardiovascular, pulmonary, gi and gu systems are negative, except as otherwise noted.      Objective    /82   Pulse 78   Temp 97.8  F (36.6  C) (Oral)   Resp 16   Ht 1.727 m (5' 8\")   Wt 77.6 kg (171 lb)   LMP 04/30/2023 (Exact Date)   SpO2 100%   BMI 26.00 kg/m    Body mass index is 26 kg/m .  Physical Exam   GENERAL: healthy, alert and no distress  NECK: no adenopathy, no asymmetry, masses, or scars and thyroid normal to palpation  RESP: lungs clear to auscultation - no rales, rhonchi or wheezes  CV: regular rate and rhythm, normal S1 S2, no S3 or S4, no murmur, click or rub, no peripheral edema and peripheral pulses strong  ABDOMEN: soft, nontender, no hepatosplenomegaly, no masses and bowel sounds normal  MS: no gross musculoskeletal defects noted, no edema  NEURO: Normal strength and tone, mentation intact and speech normal  NEURO: Sensation intact  Reflexes normal                    "

## 2023-05-11 NOTE — PATIENT INSTRUCTIONS
At [facility], we strive to deliver an exceptional experience to you, every time we see you. If you receive a survey, please complete it as we do value your feedback.  If you have MyChart, you can expect to receive results automatically within 24 hours of their completion.  Your provider will send a note interpreting your results as well.   If you do not have MyChart, you should receive your results in about a week by mail.    Your care team:                            Family Medicine Internal Medicine   MD Ricky Torres MD Shantel Branch-Fleming, MD Srinivasa Vaka, MD Katya Belousova, PASALTY Mario CNP, MD ([facility]   Tian Evans, MD Sara Diamond MD Amelia Massimini APRN ADIN Watson APRN MD Mark Arreola MD          Clinic hours: Monday - Thursday 7 am-6 pm; Fridays 7 am-5 pm.   Urgent care: Monday - Friday 10 am- 8 pm; Saturday and Sunday 9 am-5 pm.    Clinic: [phone]       [facility]: Monday - Thursday 8 am - 7 pm; Friday 8 am - 6 pm  [facility]: [phone]

## 2023-05-12 LAB — TSH SERPL DL<=0.005 MIU/L-ACNC: 1.87 MU/L (ref 0.4–4)

## 2023-05-16 ENCOUNTER — HOSPITAL ENCOUNTER (OUTPATIENT)
Dept: MRI IMAGING | Facility: HOSPITAL | Age: 43
Discharge: HOME | End: 2023-05-16
Attending: INTERNAL MEDICINE
Payer: COMMERCIAL

## 2023-05-16 DIAGNOSIS — R20.2 PARESTHESIAS: ICD-10-CM

## 2023-05-16 DIAGNOSIS — R20.0 LEFT ARM NUMBNESS: ICD-10-CM

## 2023-05-16 PROCEDURE — 255N000002 HC RX 255 OP 636: Performed by: INTERNAL MEDICINE

## 2023-05-16 PROCEDURE — 70553 MRI BRAIN STEM W/O & W/DYE: CPT

## 2023-05-16 PROCEDURE — 72156 MRI NECK SPINE W/O & W/DYE: CPT

## 2023-05-16 PROCEDURE — A9585 GADOBUTROL INJECTION: HCPCS | Performed by: INTERNAL MEDICINE

## 2023-05-16 RX ORDER — GADOBUTROL 604.72 MG/ML
0.1 INJECTION INTRAVENOUS ONCE
Status: COMPLETED | OUTPATIENT
Start: 2023-05-16 | End: 2023-05-16

## 2023-05-16 RX ADMIN — GADOBUTROL 7 ML: 604.72 INJECTION INTRAVENOUS at 20:16

## 2023-05-23 ENCOUNTER — MYC MEDICAL ADVICE (OUTPATIENT)
Dept: FAMILY MEDICINE | Facility: CLINIC | Age: 43
End: 2023-05-23
Payer: COMMERCIAL

## 2023-05-26 ENCOUNTER — TELEPHONE (OUTPATIENT)
Dept: GASTROENTEROLOGY | Facility: CLINIC | Age: 43
End: 2023-05-26

## 2023-05-26 NOTE — TELEPHONE ENCOUNTER
Attempted to contact patient regarding upcoming Colonoscopy  procedure on 6.13.2023 for pre assessment questions. No answer.     Left message to return call to [phone] #4    Discuss Covid policy and designated  policy.    Pre op exam? N/A    Arrival time: 0730. Procedure time: 0815    Facility location: [facility]; [address], [place] [address]    Sedation type: Conscious sedation     NSAIDs? No NSAID medications per patient's medication list.  RN will verify with pre-assessment call.    Anticoagulants: No    Electronic implanted devices? No    Diabetic? No    Indication for procedure: screening colonoscopy    Bowel prep recommendation: Miralax prep without magnesium citrate     Prep instructions sent via [employer]only.       Venessa Mcgee RN  Endoscopy Procedure Pre Assessment RN

## 2023-06-02 NOTE — TELEPHONE ENCOUNTER
Note time change: arrival 0745 procedure 0830    Second attempt for pre-assessment prior to upcoming colonoscopy     No answer.  Left message to return call [phone] #[phone]    Sent [employer] message.    Sophie Phillip RN  Endoscopy Procedure Pre Assessment RN

## 2023-06-05 NOTE — TELEPHONE ENCOUNTER
Pre assessment questions completed for upcoming Colonoscopy  procedure scheduled on 06.13.2023    COVID policy reviewed.     Reviewed procedural arrival time 0745 and facility location [facility]; [address], [place] [address]    Designated  policy reviewed. Instructed to have someone stay 6 hours post procedure.     NSAIDs? No    Anticoagulation/blood thinners? No    Electronic implanted devices? No    Diabetic? No    Reviewed procedure prep instructions.     Patient verbalized understanding and had no questions or concerns at this time.    Sophie Phillip RN  Endoscopy Procedure Pre Assessment RN

## 2023-06-13 ENCOUNTER — HOSPITAL ENCOUNTER (OUTPATIENT)
Facility: AMBULATORY SURGERY CENTER | Age: 43
Discharge: HOME | End: 2023-06-13
Attending: SURGERY | Admitting: SURGERY
Payer: COMMERCIAL

## 2023-06-13 VITALS
RESPIRATION RATE: 16 BRPM | OXYGEN SATURATION: 99 % | HEART RATE: 66 BPM | SYSTOLIC BLOOD PRESSURE: 137 MMHG | TEMPERATURE: 97.5 F | DIASTOLIC BLOOD PRESSURE: 84 MMHG

## 2023-06-13 LAB — COLONOSCOPY: NORMAL

## 2023-06-13 PROCEDURE — G8907 PT DOC NO EVENTS ON DISCHARG: HCPCS

## 2023-06-13 PROCEDURE — G8918 PT W/O PREOP ORDER IV AB PRO: HCPCS

## 2023-06-13 PROCEDURE — 45378 DIAGNOSTIC COLONOSCOPY: CPT

## 2023-06-13 RX ORDER — LIDOCAINE 40 MG/G
CREAM TOPICAL
Status: DISCONTINUED | OUTPATIENT
Start: 2023-06-13 | End: 2023-06-14 | Stop reason: HOSPADM

## 2023-06-13 RX ORDER — ONDANSETRON 4 MG/1
4 TABLET, ORALLY DISINTEGRATING ORAL EVERY 6 HOURS PRN
Status: DISCONTINUED | OUTPATIENT
Start: 2023-06-13 | End: 2023-06-14 | Stop reason: HOSPADM

## 2023-06-13 RX ORDER — FLUMAZENIL 0.1 MG/ML
0.2 INJECTION, SOLUTION INTRAVENOUS
Status: ACTIVE | OUTPATIENT
Start: 2023-06-13 | End: 2023-06-13

## 2023-06-13 RX ORDER — ONDANSETRON 2 MG/ML
4 INJECTION INTRAMUSCULAR; INTRAVENOUS EVERY 6 HOURS PRN
Status: DISCONTINUED | OUTPATIENT
Start: 2023-06-13 | End: 2023-06-14 | Stop reason: HOSPADM

## 2023-06-13 RX ORDER — FENTANYL CITRATE 50 UG/ML
INJECTION, SOLUTION INTRAMUSCULAR; INTRAVENOUS PRN
Status: DISCONTINUED | OUTPATIENT
Start: 2023-06-13 | End: 2023-06-13 | Stop reason: HOSPADM

## 2023-06-13 RX ORDER — PROCHLORPERAZINE MALEATE 10 MG
10 TABLET ORAL EVERY 6 HOURS PRN
Status: DISCONTINUED | OUTPATIENT
Start: 2023-06-13 | End: 2023-06-14 | Stop reason: HOSPADM

## 2023-06-13 RX ORDER — NALOXONE HYDROCHLORIDE 0.4 MG/ML
0.4 INJECTION, SOLUTION INTRAMUSCULAR; INTRAVENOUS; SUBCUTANEOUS
Status: DISCONTINUED | OUTPATIENT
Start: 2023-06-13 | End: 2023-06-14 | Stop reason: HOSPADM

## 2023-06-13 RX ORDER — ONDANSETRON 2 MG/ML
4 INJECTION INTRAMUSCULAR; INTRAVENOUS
Status: DISCONTINUED | OUTPATIENT
Start: 2023-06-13 | End: 2023-06-14 | Stop reason: HOSPADM

## 2023-06-13 RX ORDER — NALOXONE HYDROCHLORIDE 0.4 MG/ML
0.2 INJECTION, SOLUTION INTRAMUSCULAR; INTRAVENOUS; SUBCUTANEOUS
Status: DISCONTINUED | OUTPATIENT
Start: 2023-06-13 | End: 2023-06-14 | Stop reason: HOSPADM

## 2023-06-13 NOTE — H&P
Pre-Endoscopy History and Physical     Cristin Huerta MRN# [id number]   YOB: 1980 Age: 42 year old     Date of Procedure: 2023  Primary care provider: Val[facility] Radha  Type of Endoscopy: colonoscopy  Reason for Procedure: screneing. FH colon cancer multiple grandparents, polyps in multiple family members  Type of Anesthesia Anticipated: Moderate Sedation    HPI:    Cristin is a 42 year old female who will be undergoing the above procedure.      A history and physical has been performed. The patient's medications and allergies have been reviewed. The risks and benefits of the procedure and the sedation options and risks were discussed with the patient.  All questions were answered and informed consent was obtained.      She denies a personal or family history of anesthesia complications or bleeding disorders.     No Known Allergies     Cannot display prior to admission medications because the patient has not been admitted in this contact.     No current outpatient medications on file.     Current Facility-Administered Medications   Medication     lidocaine (LMX4) kit     lidocaine 1 % 0.1-1 mL     ondansetron (ZOFRAN) injection 4 mg     sodium chloride (PF) 0.9% PF flush 3 mL     sodium chloride (PF) 0.9% PF flush 3 mL         Patient Active Problem List   Diagnosis     Cervical insufficiency during pregnancy in second trimester, antepartum     Dichorionic diamniotic twin pregnancy in second trimester     Indication for care in labor or delivery     Twin pregnancy, antepartum     Pre-eclampsia, antepartum      delivery delivered     LGSIL on Pap smear of cervix        Past Medical History:   Diagnosis Date     Depression      Hypertension     Pre eclampsia during pregnancy     LGSIL on Pap smear of cervix 2011        Past Surgical History:   Procedure Laterality Date     CERCLAGE CERVICAL N/A 3/5/2020    Procedure: CERCLAGE, CERVIX, VAGINAL APPROACH;  Surgeon:  "Brady Trujillo MD;  Location: [facility]      SECTION N/A 2020    Procedure: PRIMARY  SECTION;  Surgeon: Riana Troy MD;  Location:  L+D     GYN SURGERY      colposcopy, LEEP, R tubal removal, D and C     HEAD & NECK SURGERY      jaw surgery     ORTHOPEDIC SURGERY      wrist       Social History     Tobacco Use     Smoking status: Former     Smokeless tobacco: Never     Tobacco comments:     Quit    Vaping Use     Vaping status: Never Used   Substance Use Topics     Alcohol use: Yes     Comment: 2x a week       Family History   Problem Relation Age of Onset     Skin Cancer Father      No Known Problems Mother      No Known Problems Sister      No Known Problems Brother      Colon Cancer Maternal Grandmother      Liver Cancer Maternal Grandfather      Lymphoma Maternal Grandfather      Other Cancer Maternal Grandfather      Hypertension Paternal Grandmother      Colon Cancer Paternal Grandfather      No Known Problems Other        REVIEW OF SYSTEMS:     5 point ROS negative except as noted above in HPI, including Gen., Resp., CV, GI &  system review.      PHYSICAL EXAM:   /78   Temp 97.5  F (36.4  C) (Temporal)   Resp 16   SpO2 99%  Estimated body mass index is 26 kg/m  as calculated from the following:    Height as of 23: 1.727 m (5' 8\").    Weight as of 23: 77.6 kg (171 lb).   GENERAL APPEARANCE: healthy, alert and no distress  MENTAL STATUS: alert  AIRWAY EXAM: Mallampatti Class III (visualization of the soft palate and base of uvula)  RESP: lungs clear to auscultation - no rales, rhonchi or wheezes  CV: regular rates and rhythm      DIAGNOSTICS:    Not indicated      IMPRESSION   ASA Class 2 - Mild systemic disease        PLAN:       Plan for colonoscopy. We discussed the risks, benefits and alternatives and the patient wished to proceed.    The above has been forwarded to the consulting provider.      Signed Electronically by: Rusty Wallace " MD Kym  June 13, 2023

## 2023-11-17 ENCOUNTER — E-VISIT (OUTPATIENT)
Dept: URGENT CARE | Facility: CLINIC | Age: 43
End: 2023-11-17
Payer: COMMERCIAL

## 2023-11-17 DIAGNOSIS — J01.90 ACUTE BACTERIAL SINUSITIS: Primary | ICD-10-CM

## 2023-11-17 DIAGNOSIS — B96.89 ACUTE BACTERIAL SINUSITIS: Primary | ICD-10-CM

## 2023-11-17 PROCEDURE — 99421 OL DIG E/M SVC 5-10 MIN: CPT | Performed by: EMERGENCY MEDICINE

## 2023-11-17 NOTE — PATIENT INSTRUCTIONS
Acute Sinusitis: Care Instructions  Overview     Acute sinusitis is an inflammation of the mucous membranes inside the nose and sinuses. Sinuses are the hollow spaces in your skull around the eyes and nose. Acute sinusitis often follows a cold. Acute sinusitis causes thick, discolored mucus that drains from the nose or down the back of the throat. It also can cause pain and pressure in your head and face along with a stuffy or blocked nose.  In most cases, sinusitis gets better on its own in 1 to 2 weeks. But some mild symptoms may last for several weeks. Sometimes antibiotics are needed if there is a bacterial infection.  Follow-up care is a key part of your treatment and safety. Be sure to make and go to all appointments, and call your doctor if you are having problems. It's also a good idea to know your test results and keep a list of the medicines you take.  How can you care for yourself at home?    Use saline (saltwater) nasal washes. This can help keep your nasal passages open and wash out mucus and allergens.  ? You can buy saline nose washes at a grocery store or drugstore. Follow the instructions on the package.  ? You can make your own at home. Add 1 teaspoon of non-iodized salt and 1 teaspoon of baking soda to 2 cups of distilled or boiled and cooled water. Fill a squeeze bottle or a nasal cleansing pot (such as a neti pot) with the nasal wash. Then put the tip into your nostril, and lean over the sink. With your mouth open, gently squirt the liquid. Repeat on the other side.    Try a decongestant nasal spray like oxymetazoline (Afrin). Do not use it for more than 3 days in a row. Using it for more than 3 days can make your congestion worse.    If needed, take an over-the-counter pain medicine, such as acetaminophen (Tylenol), ibuprofen (Advil, Motrin), or naproxen (Aleve). Read and follow all instructions on the label.    If the doctor prescribed antibiotics, take them as directed. Do not stop taking  "them just because you feel better. You need to take the full course of antibiotics.    Be careful when taking over-the-counter cold or flu medicines and Tylenol at the same time. Many of these medicines have acetaminophen, which is Tylenol. Read the labels to make sure that you are not taking more than the recommended dose. Too much acetaminophen (Tylenol) can be harmful.    Try a steroid nasal spray. It may help with your symptoms.    Breathe warm, moist air. You can use a steamy shower, a hot bath, or a sink filled with hot water. Avoid cold, dry air. Using a humidifier in your home may help. Follow the directions for cleaning the machine.  When should you call for help?   Call your doctor now or seek immediate medical care if:      You have new or worse swelling, redness, or pain in your face or around one or both of your eyes.       You have double vision or a change in your vision.       You have a high fever.       You have a severe headache and a stiff neck.       You have mental changes, such as feeling confused or much less alert.   Watch closely for changes in your health, and be sure to contact your doctor if:      You are not getting better as expected.   Where can you learn more?  Go to https://www.[employer].net/patiented  Enter I933 in the search box to learn more about \"Acute Sinusitis: Care Instructions.\"  Current as of: February 28, 2023               Content Version: 13.8    4363-6971 [employer].   Care instructions adapted under license by your healthcare professional. If you have questions about a medical condition or this instruction, always ask your healthcare professional. [employer] disclaims any warranty or liability for your use of this information.      Dear Cristin Huerta         Based on your responses and diagnosis, I have prescribed augmentin  to treat your symptoms. I have sent this to your pharmacy.?     It is also important to stay well hydrated, get lots " of rest and take over-the-counter decongestants,?tylenol?or ibuprofen if you?are able to?take those medications per your primary care provider to help relieve discomfort.?     It is important that you take?all of?your prescribed medication even if your symptoms are improving after a few doses.? Taking?all of?your medicine helps prevent the symptoms from returning.?     If your symptoms worsen, you develop severe headache, vomiting, high fever (>102), or are not improving in 7 days, please contact your primary care provider for an appointment or visit any of our convenient Walk-in Care or Urgent Care Centers to be seen which can be found on our website?here.?     Thanks again for choosing?us?as your health care partner,?   ?  Parish Heredia MD?

## 2024-04-01 ENCOUNTER — ANCILLARY PROCEDURE (OUTPATIENT)
Dept: MAMMOGRAPHY | Facility: CLINIC | Age: 44
End: 2024-04-01
Attending: INTERNAL MEDICINE
Payer: COMMERCIAL

## 2024-04-01 DIAGNOSIS — Z12.31 VISIT FOR SCREENING MAMMOGRAM: ICD-10-CM

## 2024-04-01 PROCEDURE — 77063 BREAST TOMOSYNTHESIS BI: CPT | Mod: GC

## 2024-04-01 PROCEDURE — 77067 SCR MAMMO BI INCL CAD: CPT | Mod: GC

## 2024-04-02 NOTE — RESULT ENCOUNTER NOTE
Dear Cristin Khan is currently out of the office and I am reviewing your results.  Your mammogram was normal.  Please call or MyChart my office with any questions or concerns.   Anay Thomas, PAC

## 2024-05-03 SDOH — HEALTH STABILITY: PHYSICAL HEALTH: ON AVERAGE, HOW MANY DAYS PER WEEK DO YOU ENGAGE IN MODERATE TO STRENUOUS EXERCISE (LIKE A BRISK WALK)?: 1 DAY

## 2024-05-03 SDOH — HEALTH STABILITY: PHYSICAL HEALTH: ON AVERAGE, HOW MANY MINUTES DO YOU ENGAGE IN EXERCISE AT THIS LEVEL?: 30 MIN

## 2024-05-03 ASSESSMENT — SOCIAL DETERMINANTS OF HEALTH (SDOH): HOW OFTEN DO YOU GET TOGETHER WITH FRIENDS OR RELATIVES?: ONCE A WEEK

## 2024-05-06 ENCOUNTER — OFFICE VISIT (OUTPATIENT)
Dept: FAMILY MEDICINE | Facility: CLINIC | Age: 44
End: 2024-05-06
Payer: COMMERCIAL

## 2024-05-06 VITALS
WEIGHT: 171.9 LBS | TEMPERATURE: 98.1 F | SYSTOLIC BLOOD PRESSURE: 130 MMHG | HEIGHT: 68 IN | RESPIRATION RATE: 19 BRPM | BODY MASS INDEX: 26.05 KG/M2 | OXYGEN SATURATION: 99 % | DIASTOLIC BLOOD PRESSURE: 83 MMHG | HEART RATE: 72 BPM

## 2024-05-06 DIAGNOSIS — Z13.220 SCREENING FOR HYPERLIPIDEMIA: ICD-10-CM

## 2024-05-06 DIAGNOSIS — Z00.00 ROUTINE GENERAL MEDICAL EXAMINATION AT A HEALTH CARE FACILITY: Primary | ICD-10-CM

## 2024-05-06 DIAGNOSIS — Z13.1 SCREENING FOR DIABETES MELLITUS: ICD-10-CM

## 2024-05-06 LAB
ANION GAP SERPL CALCULATED.3IONS-SCNC: 11 MMOL/L (ref 7–15)
BASOPHILS # BLD AUTO: 0 10E3/UL (ref 0–0.2)
BASOPHILS NFR BLD AUTO: 1 %
BUN SERPL-MCNC: 11.4 MG/DL (ref 6–20)
CALCIUM SERPL-MCNC: 8.6 MG/DL (ref 8.6–10)
CHLORIDE SERPL-SCNC: 104 MMOL/L (ref 98–107)
CHOLEST SERPL-MCNC: 125 MG/DL
CREAT SERPL-MCNC: 0.68 MG/DL (ref 0.51–0.95)
DEPRECATED HCO3 PLAS-SCNC: 23 MMOL/L (ref 22–29)
EGFRCR SERPLBLD CKD-EPI 2021: >90 ML/MIN/1.73M2
EOSINOPHIL # BLD AUTO: 0.2 10E3/UL (ref 0–0.7)
EOSINOPHIL NFR BLD AUTO: 3 %
ERYTHROCYTE [DISTWIDTH] IN BLOOD BY AUTOMATED COUNT: 12.7 % (ref 10–15)
FASTING STATUS PATIENT QL REPORTED: YES
GLUCOSE SERPL-MCNC: 102 MG/DL (ref 70–99)
HBA1C MFR BLD: 5.5 % (ref 0–5.6)
HCT VFR BLD AUTO: 38.2 % (ref 35–47)
HDLC SERPL-MCNC: 50 MG/DL
HGB BLD-MCNC: 12.6 G/DL (ref 11.7–15.7)
IMM GRANULOCYTES # BLD: 0 10E3/UL
IMM GRANULOCYTES NFR BLD: 0 %
LDLC SERPL CALC-MCNC: 59 MG/DL
LYMPHOCYTES # BLD AUTO: 1.7 10E3/UL (ref 0.8–5.3)
LYMPHOCYTES NFR BLD AUTO: 28 %
MCH RBC QN AUTO: 29.4 PG (ref 26.5–33)
MCHC RBC AUTO-ENTMCNC: 33 G/DL (ref 31.5–36.5)
MCV RBC AUTO: 89 FL (ref 78–100)
MONOCYTES # BLD AUTO: 0.4 10E3/UL (ref 0–1.3)
MONOCYTES NFR BLD AUTO: 7 %
NEUTROPHILS # BLD AUTO: 3.9 10E3/UL (ref 1.6–8.3)
NEUTROPHILS NFR BLD AUTO: 62 %
NONHDLC SERPL-MCNC: 75 MG/DL
PLATELET # BLD AUTO: 244 10E3/UL (ref 150–450)
POTASSIUM SERPL-SCNC: 4.5 MMOL/L (ref 3.4–5.3)
RBC # BLD AUTO: 4.28 10E6/UL (ref 3.8–5.2)
SODIUM SERPL-SCNC: 138 MMOL/L (ref 135–145)
TRIGL SERPL-MCNC: 78 MG/DL
TSH SERPL DL<=0.005 MIU/L-ACNC: 2.1 UIU/ML (ref 0.3–4.2)
WBC # BLD AUTO: 6.2 10E3/UL (ref 4–11)

## 2024-05-06 PROCEDURE — 99396 PREV VISIT EST AGE 40-64: CPT | Performed by: INTERNAL MEDICINE

## 2024-05-06 PROCEDURE — 80061 LIPID PANEL: CPT | Performed by: INTERNAL MEDICINE

## 2024-05-06 PROCEDURE — 36415 COLL VENOUS BLD VENIPUNCTURE: CPT | Performed by: INTERNAL MEDICINE

## 2024-05-06 PROCEDURE — 80048 BASIC METABOLIC PNL TOTAL CA: CPT | Performed by: INTERNAL MEDICINE

## 2024-05-06 PROCEDURE — 83036 HEMOGLOBIN GLYCOSYLATED A1C: CPT | Performed by: INTERNAL MEDICINE

## 2024-05-06 PROCEDURE — 85025 COMPLETE CBC W/AUTO DIFF WBC: CPT | Performed by: INTERNAL MEDICINE

## 2024-05-06 PROCEDURE — 84443 ASSAY THYROID STIM HORMONE: CPT | Performed by: INTERNAL MEDICINE

## 2024-05-06 NOTE — PROGRESS NOTES
"Preventive Care Visit  [facility]  Flakito Khan MD, Internal Medicine  May 6, 2024      Assessment & Plan     Routine general medical examination at a health care facility  Up-to-date with Pap smears  Up-to-date with all the vaccines except hepatitis B  We discussed about it today  Updated mammograms  Up-to-date with colonoscopy  - CBC with platelets and differential; Future  - TSH with free T4 reflex; Future  - Basic metabolic panel  (Ca, Cl, CO2, Creat, Gluc, K, Na, BUN); Future  - CBC with platelets and differential  - TSH with free T4 reflex  - Basic metabolic panel  (Ca, Cl, CO2, Creat, Gluc, K, Na, BUN)    Screening for diabetes mellitus    - Hemoglobin A1c; Future  - Hemoglobin A1c    Screening for hyperlipidemia    - Lipid panel reflex to direct LDL Fasting; Future  - Lipid panel reflex to direct LDL Fasting    Patient has been advised of split billing requirements and indicates understanding: No    30 minutes spent by me on the date of the encounter doing chart review, history and exam, documentation and further activities per the note      BMI  Estimated body mass index is 26.33 kg/m  as calculated from the following:    Height as of this encounter: 1.721 m (5' 7.75\").    Weight as of this encounter: 78 kg (171 lb 14.4 oz).   Weight management plan: Discussed healthy diet and exercise guidelines    Counseling  Appropriate preventive services were discussed with this patient, including applicable screening as appropriate for fall prevention, nutrition, physical activity, Tobacco-use cessation, weight loss and cognition.  Checklist reviewing preventive services available has been given to the patient.  Reviewed patient's diet, addressing concerns and/or questions.   She is at risk for lack of exercise and has been provided with information to increase physical activity for the benefit of her well-being.   She is at risk for psychosocial distress and has been provided with " information to reduce risk.           Konstantin Amaral is a 43 year old, presenting for the following:  Physical (annual)        5/6/2024     7:15 AM   Additional Questions   Roomed by Laine RIGGINS   Accompanied by Self         5/6/2024     7:15 AM   Patient Reported Additional Medications   Patient reports taking the following new medications None        Health Care Directive  Patient does not have a Health Care Directive or Living Will: Patient states has Advance Directive and will bring in a copy to clinic.    HPI  Here for annual physical            5/3/2024   General Health   How would you rate your overall physical health? Good   Feel stress (tense, anxious, or unable to sleep) Only a little   (!) STRESS CONCERN      5/3/2024   Nutrition   Three or more servings of calcium each day? Yes   Diet: Regular (no restrictions)   How many servings of fruit and vegetables per day? (!) 2-3   How many sweetened beverages each day? 0-1         5/3/2024   Exercise   Days per week of moderate/strenous exercise 1 day   Average minutes spent exercising at this level 30 min   (!) EXERCISE CONCERN      5/3/2024   Social Factors   Frequency of gathering with friends or relatives Once a week   Worry food won't last until get money to buy more No   Food not last or not have enough money for food? No   Do you have housing?  Yes   Are you worried about losing your housing? No   Lack of transportation? No   Unable to get utilities (heat,electricity)? No         5/3/2024   Dental   Dentist two times every year? Yes         5/3/2024   TB Screening   Were you born outside of the [place]? No         Today's PHQ-2 Score:       5/6/2024     7:11 AM   PHQ-2 ( 1999 [employer])   Q1: Little interest or pleasure in doing things 0   Q2: Feeling down, depressed or hopeless 0   PHQ-2 Score 0   Q1: Little interest or pleasure in doing things Not at all   Q2: Feeling down, depressed or hopeless Not at all   PHQ-2 Score 0           5/3/2024   Substance Use  "  Alcohol more than 3/day or more than 7/wk No   Do you use any other substances recreationally? (!) ALCOHOL     Social History     Tobacco Use    Smoking status: Former    Smokeless tobacco: Never    Tobacco comments:     Quit 2005   Vaping Use    Vaping status: Never Used   Substance Use Topics    Alcohol use: Yes     Comment: 2x a week    Drug use: Not Currently     Types: Marijuana           4/1/2024   LAST FHS-7 RESULTS   1st degree relative breast or ovarian cancer No   Any relative bilateral breast cancer No   Any male have breast cancer No   Any ONE woman have BOTH breast AND ovarian cancer No   Any woman with breast cancer before 50yrs No   2 or more relatives with breast AND/OR ovarian cancer No   2 or more relatives with breast AND/OR bowel cancer Yes                5/3/2024   STI Screening   New sexual partner(s) since last STI/HIV test? No     History of abnormal Pap smear: NO - age 30-65 PAP every 5 years with negative HPV co-testing recommended        Latest Ref Rng & Units 7/30/2021     2:55 PM   PAP / HPV   PAP  Negative for Intraepithelial Lesion or Malignancy (NILM)    HPV 16 DNA Negative Negative    HPV 18 DNA Negative Negative    Other HR HPV Negative Negative      ASCVD Risk   The ASCVD Risk score (Elijah ROBBINS, et al., 2019) failed to calculate for the following reasons:    The valid total cholesterol range is 130 to 320 mg/dL        5/3/2024   Contraception/Family Planning   Questions about contraception or family planning No        Reviewed and updated as needed this visit by Provider                          Review of Systems  Constitutional, HEENT, cardiovascular, pulmonary, gi and gu systems are negative, except as otherwise noted.     Objective    Exam  /83 (BP Location: Right arm, Patient Position: Sitting, Cuff Size: Adult Regular)   Pulse 72   Temp 98.1  F (36.7  C) (Oral)   Resp 19   Ht 1.721 m (5' 7.75\")   Wt 78 kg (171 lb 14.4 oz)   LMP 04/15/2024   SpO2 99%   " "BMI 26.33 kg/m     Estimated body mass index is 26.33 kg/m  as calculated from the following:    Height as of this encounter: 1.721 m (5' 7.75\").    Weight as of this encounter: 78 kg (171 lb 14.4 oz).    Physical Exam  GENERAL: alert and no distress  EYES: Eyes grossly normal to inspection, PERRL and conjunctivae and sclerae normal  HENT: ear canals and TM's normal, nose and mouth without ulcers or lesions  NECK: no adenopathy, no asymmetry, masses, or scars  RESP: lungs clear to auscultation - no rales, rhonchi or wheezes  CV: regular rate and rhythm, normal S1 S2, no S3 or S4, no murmur, click or rub, no peripheral edema  ABDOMEN: soft, nontender, no hepatosplenomegaly, no masses and bowel sounds normal  MS: no gross musculoskeletal defects noted, no edema  SKIN: no suspicious lesions or rashes  NEURO: Normal strength and tone, mentation intact and speech normal  PSYCH: mentation appears normal, affect normal/bright        Signed Electronically by: Flakito Khan MD    "

## 2024-10-03 ENCOUNTER — OFFICE VISIT (OUTPATIENT)
Dept: FAMILY MEDICINE | Facility: CLINIC | Age: 44
End: 2024-10-03
Payer: COMMERCIAL

## 2024-10-03 VITALS
SYSTOLIC BLOOD PRESSURE: 130 MMHG | WEIGHT: 171.7 LBS | RESPIRATION RATE: 14 BRPM | HEART RATE: 62 BPM | DIASTOLIC BLOOD PRESSURE: 85 MMHG | HEIGHT: 68 IN | OXYGEN SATURATION: 99 % | TEMPERATURE: 98 F | BODY MASS INDEX: 26.02 KG/M2

## 2024-10-03 DIAGNOSIS — K21.00 GASTROESOPHAGEAL REFLUX DISEASE WITH ESOPHAGITIS, UNSPECIFIED WHETHER HEMORRHAGE: ICD-10-CM

## 2024-10-03 DIAGNOSIS — R14.2 BURPING: ICD-10-CM

## 2024-10-03 DIAGNOSIS — S46.811A STRAIN OF TRAPEZIUS MUSCLE, RIGHT, INITIAL ENCOUNTER: Primary | ICD-10-CM

## 2024-10-03 PROBLEM — O34.32 CERVICAL INSUFFICIENCY DURING PREGNANCY IN SECOND TRIMESTER, ANTEPARTUM: Status: RESOLVED | Noted: 2020-03-05 | Resolved: 2024-10-03

## 2024-10-03 PROCEDURE — 99214 OFFICE O/P EST MOD 30 MIN: CPT | Performed by: FAMILY MEDICINE

## 2024-10-03 RX ORDER — CYCLOBENZAPRINE HCL 10 MG
10 TABLET ORAL AT BEDTIME
Qty: 20 TABLET | Refills: 0 | Status: SHIPPED | OUTPATIENT
Start: 2024-10-03

## 2024-10-03 RX ORDER — PANTOPRAZOLE SODIUM 20 MG/1
20 TABLET, DELAYED RELEASE ORAL DAILY
Qty: 90 TABLET | Refills: 0 | Status: SHIPPED | OUTPATIENT
Start: 2024-10-03

## 2024-10-03 ASSESSMENT — PAIN SCALES - GENERAL: PAINLEVEL: NO PAIN (0)

## 2024-10-03 NOTE — PROGRESS NOTES
"  Assessment & Plan     Strain of trapezius muscle, right, initial encounter  Advised Back stretches  Massage therapy  Alternate between ice and heat  - cyclobenzaprine (FLEXERIL) 10 MG tablet; Take 1 tablet (10 mg) by mouth at bedtime.    Gastroesophageal reflux disease with esophagitis, unspecified whether hemorrhage  GERD precautions advised.  - pantoprazole (PROTONIX) 20 MG EC tablet; Take 1 tablet (20 mg) by mouth daily.    Burping  - pantoprazole (PROTONIX) 20 MG EC tablet; Take 1 tablet (20 mg) by mouth daily.    Close follow up if symptoms worsen or persist.          Konstantin Amaral is a 43 year old, presenting for the following health issues:  Musculoskeletal Problem      10/3/2024     9:54 AM   Additional Questions   Roomed by AG   Accompanied by self         10/3/2024     9:54 AM   Patient Reported Additional Medications   Patient reports taking the following new medications naturemade women's multi, collagen peptides   Patient here with concerns for increased \"burping\", she is quite concerned as this symptoms is associated with pain her right upper back and shoulder. She has upper back pains. She denies other GI or MSK symptoms. Symptoms not correlating with food. She denies weight changes, melena, hematochezia  Musculoskeletal Problem    History of Present Illness       Back Pain:  She presents for follow up of back pain. Patient's back pain is a new problem.    Original cause of back pain: not sure  First noticed back pain: more than 1 month ago  Patient feels back pain: 2 to 4 times per dayLocation of back pain:  Right upper back  Description of back pain: sharp and stabbing  Back pain spreads: nowhere    Since patient first noticed back pain, pain is: unchanged  Does back pain interfere with her job:  No  On a scale of 1-10 (10 being the worst), patient describes pain as:  6  What makes back pain worse: other   Acupuncture: not tried  Acetaminophen: not helpful  Activity or exercise: not " "helpful  Chiropractor:  Not tried  Cold: not helpful  Heat: not helpful  Massage: not helpful  Muscle relaxants: not tried  NSAIDS: not helpful  Opioids: not tried  Physical Therapy: not tried  Rest: not helpful  Steroid Injection: not tried  Stretching: not helpful  Surgery: not tried  TENS unit: not tried  Topical pain relievers: not helpful  Other healthcare providers patient is seeing for back pain: None    Headaches:   Since the patient's last clinic visit, headaches are: no change  The patient is getting headaches:  Every couple of days  She is able to do normal daily activities when she has a migraine.  The patient is taking the following rescue/relief medications:  Ibuprofen (Advil, Motrin)   Patient states \"I get some relief\" from the rescue/relief medications.   The patient is taking the following medications to prevent migraines:  No medications to prevent migraines  In the past 4 weeks, the patient has gone to an Urgent Care or Emergency Room 0 times times due to headaches.    Reason for visit:  Sharp back pain related to burping  Symptom onset:  More than a month  Symptoms include:  Pain in one spot, headaches (may be unrelated)  Symptom intensity:  Moderate  Symptom progression:  Staying the same  Had these symptoms before:  No  What makes it worse:  Burping  What makes it better:  No   She is taking medications regularly.     LMP 09/10/2024.      Review of Systems  Constitutional, HEENT, cardiovascular, pulmonary, GI, , musculoskeletal, neuro, skin, endocrine and psych systems are negative, except as otherwise noted.      Objective    /85   Pulse 62   Temp 98  F (36.7  C) (Temporal)   Resp 14   Ht 1.72 m (5' 7.72\")   Wt 77.9 kg (171 lb 11.2 oz)   SpO2 99%   BMI 26.33 kg/m    Body mass index is 26.33 kg/m .  Physical Exam   GENERAL: alert and no distress  EYES: Eyes grossly normal to inspection, PERRL and conjunctivae and sclerae normal  HENT: ear canals and TM's normal, nose and mouth " without ulcers or lesions  NECK: no adenopathy, no asymmetry, masses, or scars  RESP: lungs clear to auscultation - no rales, rhonchi or wheezes  CV: regular rate and rhythm, normal S1 S2, no S3 or S4, no murmur, click or rub, no peripheral edema  ABDOMEN: soft, nontender, no hepatosplenomegaly, no masses and bowel sounds normal  MS: no gross musculoskeletal defects noted, no edema. Spasm Right upper back and neck.  NEURO: Normal strength and tone, mentation intact and speech normal  PSYCH: mentation appears normal, affect normal/bright        Signed Electronically by: Citlalli Live MD

## 2024-10-21 ENCOUNTER — VIRTUAL VISIT (OUTPATIENT)
Dept: FAMILY MEDICINE | Facility: CLINIC | Age: 44
End: 2024-10-21
Payer: COMMERCIAL

## 2024-10-21 DIAGNOSIS — K21.00 GASTROESOPHAGEAL REFLUX DISEASE WITH ESOPHAGITIS, UNSPECIFIED WHETHER HEMORRHAGE: ICD-10-CM

## 2024-10-21 DIAGNOSIS — R14.2 BURPING: Primary | ICD-10-CM

## 2024-10-21 PROCEDURE — 99443 PR PHYSICIAN TELEPHONE EVALUATION 21-30 MIN: CPT | Mod: 95 | Performed by: FAMILY MEDICINE

## 2024-10-21 NOTE — PROGRESS NOTES
Cristin is a 43 year old who is being evaluated via a billable telephone visit.    How would you like to obtain your AVS? MyChart  If the video visit is dropped, the invitation should be resent by: Text to cell phone: [phone]  Will anyone else be joining your video visit? No  Originating Location (pt. Location): Home    Distant Location (provider location):  On-site    Assessment & Plan     Burping  Patient still complains of burping without hiccups, alarming GI symptoms but symptoms worsen upper back pain. Mild improvement with PPI, see diagnostic order.  - Adult GI  Referral - Procedure Only; Future    Gastroesophageal reflux disease with esophagitis, unspecified whether hemorrhage  - Adult GI  Referral - Procedure Only; Future      Subjective   Cristin is a 43 year old, presenting for the following health issues:  Gastrointestinal Problem and Back Pain    Patient was arrived for their virtual visit without speaking to onsite clinic staff as patient has completed their E-Check in as well as questionnaires needed for this visit.    History of Present Illness       Back Pain:  She presents for follow up of back pain. Patient's back pain is a recurring problem.  Location of back pain:  Right upper back  Description of back pain: sharp  Back pain spreads: nowhere    Since patient first noticed back pain, pain is: unchanged  Does back pain interfere with her job:  No       She eats 4 or more servings of fruits and vegetables daily.She consumes 1 sweetened beverage(s) daily.She exercises with enough effort to increase her heart rate 20 to 29 minutes per day.  She exercises with enough effort to increase her heart rate 3 or less days per week.   She is taking medications regularly.           Review of Systems  Constitutional, HEENT, cardiovascular, pulmonary, GI, , musculoskeletal, neuro, skin, endocrine and psych systems are negative, except as otherwise noted.      Objective           Vitals:  No  vitals were obtained today due to virtual visit.    Physical Exam   General: Alert and no distress //Respiratory: No audible wheeze, cough, or shortness of breath // Psychiatric:  Appropriate affect, tone, and pace of words          Phone call duration: 21 minutes  Signed Electronically by: Citlalli Live MD

## 2024-10-22 ENCOUNTER — TELEPHONE (OUTPATIENT)
Dept: GASTROENTEROLOGY | Facility: CLINIC | Age: 44
End: 2024-10-22
Payer: COMMERCIAL

## 2024-10-22 NOTE — TELEPHONE ENCOUNTER
"Endoscopy Scheduling Screen    Have you had any respiratory illness or flu-like symptoms in the last 10 days?  No    What is your communication preference for Instructions and/or Bowel Prep?   MyChart    What insurance is in the chart?  Other:       Ordering/Referring Provider:     SOTERO PEREZ       (If ordering provider performs procedure, schedule with ordering provider unless otherwise instructed. )    BMI: Estimated body mass index is 26.33 kg/m  as calculated from the following:    Height as of 10/3/24: 1.72 m (5' 7.72\").    Weight as of 10/3/24: 77.9 kg (171 lb 11.2 oz).     Sedation Ordered  moderate sedation.   If patient BMI > 50 do not schedule in [facility].    If patient BMI > 45 do not schedule at [facility].    Are you taking methadone or Suboxone?  NO, No RN review required.    Have you been diagnosed and are being treated for severe PTSD or severe anxiety?  NO, No RN review required.    Are you taking any prescription medications for pain 3 or more times per week?   NO, No RN review required.    Do you have a history of malignant hyperthermia?  No    (Females) Are you currently pregnant?   No     Have you been diagnosed or told you have pulmonary hypertension?   No    Do you have an LVAD?  No    Have you been told you have moderate to severe sleep apnea?  No.    Have you been told you have COPD, asthma, or any other lung disease?  No    Do you have any heart conditions?  No     Have you ever had or are you waiting for an organ transplant?  No. Continue scheduling, no site restrictions.    Have you had a stroke or transient ischemic attack (TIA aka \"mini stroke\" in the last 6 months?   No    Have you been diagnosed with or been told you have cirrhosis of the liver?   No.    Are you currently on dialysis?   No    Do you need assistance transferring?   No    BMI: Estimated body mass index is 26.33 kg/m  as calculated from the following:    Height as of 10/3/24: 1.72 m (5' 7.72\").    Weight as of 10/3/24: " 77.9 kg (171 lb 11.2 oz).     Is patients BMI > 40 and scheduling location [facility]?  No    Do you take an injectable or oral medication for weight loss or diabetes (excluding insulin)?  No    Do you take the medication Naltrexone?  No    Do you take blood thinners?  No       Prep   Are you currently on dialysis or do you have chronic kidney disease?  No    Do you have a diagnosis of diabetes?  No    Do you have a diagnosis of cystic fibrosis (CF)?  No    On a regular basis do you go 3 -5 days between bowel movements?  No    BMI > 40?  No    Preferred Pharmacy:    [facility] [id number] [place] [id number] [facility] [id number]  Phone: [phone] Fax: [phone]      Final Scheduling Details     Procedure scheduled  Upper endoscopy (EGD)    Surgeon:  Ashwini      Date of procedure:  12/06/2024     Pre-OP / PAC:   No - Not required for this site.    Location  [facility] - [facility] - Per order.    Sedation   Moderate Sedation - Per order.      Patient Reminders:   You will receive a call from a Nurse to review instructions and health history.  This assessment must be completed prior to your procedure.  Failure to complete the Nurse assessment may result in the procedure being cancelled.      On the day of your procedure, please designate an adult(s) who can drive you home stay with you for the next 24 hours. The medicines used in the exam will make you sleepy. You will not be able to drive.      You cannot take public transportation, ride share services, or non-medical taxi service without a responsible caregiver.  Medical transport services are allowed with the requirement that a responsible caregiver will receive you at your destination.  We require that drivers and caregivers are confirmed prior to your procedure.

## 2024-12-02 RX ORDER — ONDANSETRON 4 MG/1
4 TABLET, ORALLY DISINTEGRATING ORAL EVERY 6 HOURS PRN
Status: CANCELLED | OUTPATIENT
Start: 2024-12-02

## 2024-12-02 RX ORDER — NALOXONE HYDROCHLORIDE 0.4 MG/ML
0.2 INJECTION, SOLUTION INTRAMUSCULAR; INTRAVENOUS; SUBCUTANEOUS
Status: CANCELLED | OUTPATIENT
Start: 2024-12-02

## 2024-12-02 RX ORDER — ONDANSETRON 2 MG/ML
4 INJECTION INTRAMUSCULAR; INTRAVENOUS EVERY 6 HOURS PRN
Status: CANCELLED | OUTPATIENT
Start: 2024-12-02

## 2024-12-02 RX ORDER — NALOXONE HYDROCHLORIDE 0.4 MG/ML
0.4 INJECTION, SOLUTION INTRAMUSCULAR; INTRAVENOUS; SUBCUTANEOUS
Status: CANCELLED | OUTPATIENT
Start: 2024-12-02

## 2024-12-02 RX ORDER — PROCHLORPERAZINE MALEATE 10 MG
10 TABLET ORAL EVERY 6 HOURS PRN
Status: CANCELLED | OUTPATIENT
Start: 2024-12-02

## 2024-12-02 RX ORDER — FLUMAZENIL 0.1 MG/ML
0.2 INJECTION, SOLUTION INTRAVENOUS
Status: CANCELLED | OUTPATIENT
Start: 2024-12-02 | End: 2024-12-02

## 2024-12-06 ENCOUNTER — HOSPITAL ENCOUNTER (OUTPATIENT)
Facility: AMBULATORY SURGERY CENTER | Age: 44
Discharge: HOME | End: 2024-12-06
Attending: INTERNAL MEDICINE | Admitting: INTERNAL MEDICINE
Payer: COMMERCIAL

## 2024-12-06 VITALS
DIASTOLIC BLOOD PRESSURE: 100 MMHG | RESPIRATION RATE: 16 BRPM | TEMPERATURE: 97.3 F | HEART RATE: 61 BPM | SYSTOLIC BLOOD PRESSURE: 145 MMHG | OXYGEN SATURATION: 97 %

## 2024-12-06 LAB
HCG UR QL: NEGATIVE
UPPER GI ENDOSCOPY: NORMAL

## 2024-12-06 PROCEDURE — 43235 EGD DIAGNOSTIC BRUSH WASH: CPT

## 2024-12-06 PROCEDURE — G8918 PT W/O PREOP ORDER IV AB PRO: HCPCS

## 2024-12-06 PROCEDURE — 81025 URINE PREGNANCY TEST: CPT | Performed by: INTERNAL MEDICINE

## 2024-12-06 PROCEDURE — G8907 PT DOC NO EVENTS ON DISCHARG: HCPCS

## 2024-12-06 RX ORDER — LIDOCAINE 40 MG/G
CREAM TOPICAL
Status: DISCONTINUED | OUTPATIENT
Start: 2024-12-06 | End: 2024-12-07 | Stop reason: HOSPADM

## 2024-12-06 RX ORDER — ONDANSETRON 2 MG/ML
4 INJECTION INTRAMUSCULAR; INTRAVENOUS
Status: DISCONTINUED | OUTPATIENT
Start: 2024-12-06 | End: 2024-12-07 | Stop reason: HOSPADM

## 2024-12-06 RX ORDER — FENTANYL CITRATE 50 UG/ML
INJECTION, SOLUTION INTRAMUSCULAR; INTRAVENOUS PRN
Status: DISCONTINUED | OUTPATIENT
Start: 2024-12-06 | End: 2024-12-06 | Stop reason: HOSPADM

## 2024-12-06 NOTE — H&P
[facility] Anesthesia Pre-op History and Physical    Cristin Huerta MRN# [id number]   Age: 44 year old YOB: 1980            Date of Exam 12/6/2024       Primary care provider: Flakito Khan         Chief Complaint and/or Reason for Procedure:     Belching associated with R shoulder pain         Active problem list:     Patient Active Problem List    Diagnosis Date Noted    Indication for care in labor or delivery 04/27/2020     Priority: Medium    Twin pregnancy, antepartum 04/27/2020     Priority: Medium     Added automatically from request for surgery [id number]      Pre-eclampsia, antepartum 04/27/2020     Priority: Medium     Added automatically from request for surgery [id number]      Dichorionic diamniotic twin pregnancy in second trimester 03/05/2020     Priority: Medium     Added automatically from request for surgery [id number]      LGSIL on Pap smear of cervix 02/09/2011     Priority: Medium     From Care Everywhere:  2/9/11 pap LSIL  7/5/11 colp. bx ANNA I, ECC negative.   1/10/12 colp. bx ANNA 1, ECC ANNA I  8/7/12 colp. bx ANNA I, ECC negative  2/18/13 pap NIL  10/17/13 pap NIL, HPV negative. Plan- repeat in 1 year  12/31/14 NIL pap, neg HPV  10/18/16 NIL pap, neg HPV  7/30/21 NIL pap, neg HPV. Plan: routine screening                Medications (include herbals and vitamins):   Any Plavix use in the last 7 days? No     Current Outpatient Medications   Medication Sig Dispense Refill    pantoprazole (PROTONIX) 20 MG EC tablet Take 1 tablet (20 mg) by mouth daily. 90 tablet 0    cyclobenzaprine (FLEXERIL) 10 MG tablet Take 1 tablet (10 mg) by mouth at bedtime. 20 tablet 0     Current Facility-Administered Medications   Medication Dose Route Frequency Provider Last Rate Last Admin    lidocaine (LMX4) kit   Topical Q1H PRN Evelyn Norris, DO        lidocaine 1 % 0.1-1 mL  0.1-1 mL Other Q1H PRN Evelyn Norris, DO        ondansetron (ZOFRAN) injection 4 mg  4 mg Intravenous Once PRN Jr  DO Evelyn        sodium chloride (PF) 0.9% PF flush 3 mL  3 mL Intracatheter Q8H Evelyn Norris DO        sodium chloride (PF) 0.9% PF flush 3 mL  3 mL Intracatheter q1 min prn Evelyn Norris DO                 Allergies:    No Known Allergies  Allergy to Latex? No  Allergy to tape?   No  Intolerances:             Physical Exam:   All vitals have been reviewed  Patient Vitals for the past 8 hrs:   BP Temp Temp src Pulse Resp SpO2   12/06/24 0824 (!) 147/95 98  F (36.7  C) Temporal 71 20 98 %     No intake/output data recorded.  Lungs:   no increased work of breathing     Cardiovascular:   RRR             Lab / Radiology Results:         Anesthetic risk and/or ASA classification:   2    Evelyn Norris DO

## 2025-04-07 ENCOUNTER — PATIENT OUTREACH (OUTPATIENT)
Dept: CARE COORDINATION | Facility: CLINIC | Age: 45
End: 2025-04-07
Payer: COMMERCIAL

## 2025-06-21 ENCOUNTER — HEALTH MAINTENANCE LETTER (OUTPATIENT)
Age: 45
End: 2025-06-21

## (undated) DEVICE — GLOVE PROTEXIS BLUE W/NEU-THERA 6.0  2D73EB60

## (undated) DEVICE — SOL WATER IRRIG 1000ML BOTTLE 07139-09

## (undated) DEVICE — GLOVE PROTEXIS MICRO 7.0  2D73PM70

## (undated) DEVICE — CATH TRAY FOLEY 16FR BARDEX W/DRAIN BAG STATLOCK 300316A

## (undated) DEVICE — GLOVE ESTEEM POWDER FREE SMT 6.0  2D72PT60

## (undated) DEVICE — SUCTION TRAP DELEE 10FR 20ML

## (undated) DEVICE — Device

## (undated) DEVICE — DRAPE IOBAN C-SECTION W/POUCH 30X35" 6657

## (undated) DEVICE — PREP SKIN SCRUB TRAY 4461A

## (undated) DEVICE — SOL NACL 0.9% IRRIG 1000ML BOTTLE 07138-09

## (undated) DEVICE — DRAPE GYN/UROLOGY FLUID POUCH TUR 29455

## (undated) DEVICE — SUCTION TIP YANKAUER W/O VENT K86

## (undated) DEVICE — SU VICRYL 0 CT 36" J358H

## (undated) DEVICE — SUCTION CANISTER MEDIVAC LINER 3000ML W/LID 65651-530

## (undated) DEVICE — PREP POVIDONE IODINE USP 7.5% CLEANING SOL 64538161

## (undated) DEVICE — GLOVE BIOGEL PI MICRO INDICATOR UNDERGLOVE SZ 7.5 48975

## (undated) DEVICE — BLADE CLIPPER 4406

## (undated) DEVICE — DRAPE POUCH IRR 1016

## (undated) DEVICE — PREP CHLORAPREP 26ML TINTED ORANGE  260815

## (undated) DEVICE — SOL NACL 0.9% IRRIG 1000ML BOTTLE 2F7124

## (undated) DEVICE — GLOVE BIOGEL PI ULTRATOUCH G SZ 7.5 42175

## (undated) DEVICE — TUBING SUCTION 10'X3/16" N510

## (undated) DEVICE — SU VICRYL 2-0 CT-1 27" J339H

## (undated) DEVICE — SPONGE RAY-TEC 4X8" 7318

## (undated) DEVICE — SU VICRYL 4-0 P-3 18" J464G

## (undated) DEVICE — DRSG STERI STRIP 1/2X4" R1547

## (undated) DEVICE — PACK C-SECTION LF PL15OTA83B

## (undated) DEVICE — NDL COUNTER 20CT 31142493

## (undated) DEVICE — SU ETHILON 2 CT-2 30" D-6865

## (undated) DEVICE — GOWN LG DISP 9515

## (undated) DEVICE — LINEN C-SECTION 5415

## (undated) DEVICE — GLOVE PROTEXIS W/NEU-THERA 7.5  2D73TE75

## (undated) DEVICE — SYR BULB IRRIG 50ML LATEX FREE 0035280

## (undated) DEVICE — ESU GROUND PAD UNIVERSAL W/O CORD

## (undated) DEVICE — PREP POVIDONE IODINE USP 10% TOPICAL SOL 64537161

## (undated) DEVICE — LIGHT HANDLE X2

## (undated) DEVICE — LUBRICATING JELLY 2.7GM T00137

## (undated) RX ORDER — MORPHINE SULFATE 1 MG/ML
INJECTION, SOLUTION EPIDURAL; INTRATHECAL; INTRAVENOUS
Status: DISPENSED
Start: 2020-06-29

## (undated) RX ORDER — OXYTOCIN/0.9 % SODIUM CHLORIDE 30/500 ML
PLASTIC BAG, INJECTION (ML) INTRAVENOUS
Status: DISPENSED
Start: 2020-06-29

## (undated) RX ORDER — FENTANYL CITRATE 50 UG/ML
INJECTION, SOLUTION INTRAMUSCULAR; INTRAVENOUS
Status: DISPENSED
Start: 2020-06-29

## (undated) RX ORDER — FENTANYL CITRATE 50 UG/ML
INJECTION, SOLUTION INTRAMUSCULAR; INTRAVENOUS
Status: DISPENSED
Start: 2023-06-13

## (undated) RX ORDER — FENTANYL CITRATE 50 UG/ML
INJECTION, SOLUTION INTRAMUSCULAR; INTRAVENOUS
Status: DISPENSED
Start: 2024-12-06